# Patient Record
Sex: MALE | Race: WHITE | NOT HISPANIC OR LATINO | Employment: FULL TIME | URBAN - METROPOLITAN AREA
[De-identification: names, ages, dates, MRNs, and addresses within clinical notes are randomized per-mention and may not be internally consistent; named-entity substitution may affect disease eponyms.]

---

## 2019-08-05 ENCOUNTER — OFFICE VISIT (OUTPATIENT)
Dept: FAMILY MEDICINE | Facility: CLINIC | Age: 64
End: 2019-08-05
Payer: MEDICARE

## 2019-08-05 VITALS
TEMPERATURE: 98.4 F | HEART RATE: 77 BPM | HEIGHT: 72 IN | OXYGEN SATURATION: 98 % | BODY MASS INDEX: 26.82 KG/M2 | SYSTOLIC BLOOD PRESSURE: 144 MMHG | WEIGHT: 198 LBS | DIASTOLIC BLOOD PRESSURE: 86 MMHG

## 2019-08-05 DIAGNOSIS — Z12.5 SCREENING FOR PROSTATE CANCER: ICD-10-CM

## 2019-08-05 DIAGNOSIS — Z00.00 ENCOUNTER FOR MEDICARE ANNUAL WELLNESS EXAM: Primary | ICD-10-CM

## 2019-08-05 DIAGNOSIS — Z23 NEED FOR TDAP VACCINATION: ICD-10-CM

## 2019-08-05 DIAGNOSIS — L72.3 SEBACEOUS CYST: ICD-10-CM

## 2019-08-05 DIAGNOSIS — Z11.59 NEED FOR HEPATITIS C SCREENING TEST: ICD-10-CM

## 2019-08-05 DIAGNOSIS — E78.5 HYPERLIPIDEMIA, UNSPECIFIED HYPERLIPIDEMIA TYPE: ICD-10-CM

## 2019-08-05 DIAGNOSIS — Z23 NEED FOR PNEUMOCOCCAL VACCINE: ICD-10-CM

## 2019-08-05 DIAGNOSIS — Z13.1 SCREENING FOR DIABETES MELLITUS: ICD-10-CM

## 2019-08-05 PROCEDURE — 90715 TDAP VACCINE 7 YRS/> IM: CPT | Performed by: FAMILY MEDICINE

## 2019-08-05 PROCEDURE — G0009 ADMIN PNEUMOCOCCAL VACCINE: HCPCS | Mod: 59 | Performed by: FAMILY MEDICINE

## 2019-08-05 PROCEDURE — 90471 IMMUNIZATION ADMIN: CPT | Performed by: FAMILY MEDICINE

## 2019-08-05 PROCEDURE — 90670 PCV13 VACCINE IM: CPT | Performed by: FAMILY MEDICINE

## 2019-08-05 PROCEDURE — G0438 PPPS, INITIAL VISIT: HCPCS | Performed by: FAMILY MEDICINE

## 2019-08-05 PROCEDURE — 99213 OFFICE O/P EST LOW 20 MIN: CPT | Mod: 25 | Performed by: FAMILY MEDICINE

## 2019-08-05 RX ORDER — SIMVASTATIN 40 MG
TABLET ORAL
COMMUNITY
Start: 2019-06-03 | End: 2019-11-20

## 2019-08-05 SDOH — HEALTH STABILITY: MENTAL HEALTH: HOW OFTEN DO YOU HAVE A DRINK CONTAINING ALCOHOL?: 2-3 TIMES A WEEK

## 2019-08-05 SDOH — HEALTH STABILITY: MENTAL HEALTH: HOW MANY STANDARD DRINKS CONTAINING ALCOHOL DO YOU HAVE ON A TYPICAL DAY?: 1 OR 2

## 2019-08-05 ASSESSMENT — MIFFLIN-ST. JEOR: SCORE: 1713.18

## 2019-08-05 ASSESSMENT — ACTIVITIES OF DAILY LIVING (ADL): CURRENT_FUNCTION: NO ASSISTANCE NEEDED

## 2019-08-05 NOTE — PROGRESS NOTES
"SUBJECTIVE:   Joseph Grissom is a 63 year old male who presents for Preventive Visit.    Are you in the first 12 months of your Medicare coverage?  No      Healthy Habits:    In general, how would you rate your overall health?  Excellent    Frequency of exercise:  2-3 days/week    Duration of exercise:  45-60 minutes    Do you usually eat at least 4 servings of fruit and vegetables a day, include whole grains    & fiber and avoid regularly eating high fat or \"junk\" foods?  Yes    Taking medications regularly:  Yes    Barriers to taking medications:  None    Medication side effects:  None    Ability to successfully perform activities of daily living:  No assistance needed    Home Safety:  No safety concerns identified    Hearing Impairment:  Find that men's voices are easier to understand than woman's    In the past 6 months, have you been bothered by leaking of urine? Yes    In general, how would you rate your overall mental or emotional health?  Very good      PHQ-2 Total Score:    Additional concerns today:  Yes      Cyst  bump on head that sometimes hurts x 3 years. Maybe has gotten a little bigger.    Hyperlipidemia: has been taking simvastatin. Interested in stopping medication. Feels like his diet was pretty bad in the past. Has been taking medication regularly and cut back on fatty foods.     Do you feel safe in your environment? Yes    Do you have a Health Care Directive? No: Advance care planning reviewed with patient; information given to patient to review.      Fall risk  Fallen 2 or more times in the past year?: No  Any fall with injury in the past year?: No    Cognitive Screening   1) Repeat 3 items (Leader, Season, Table)    2) Clock draw: NORMAL  3) 3 item recall: Recalls 3 objects  Results: 3 items recalled: COGNITIVE IMPAIRMENT LESS LIKELY    Mini-CogTM Copyright NICOLE Dacosta. Licensed by the author for use in [facility]; reprinted with permission (elza@.[email or web address]). All rights reserved.  "     Do you have sleep apnea, excessive snoring or daytime drowsiness?: no    Reviewed and updated as needed this visit by clinical staff  Tobacco  Allergies  Meds  Problems  Med Hx  Surg Hx  Soc Hx        Reviewed and updated as needed this visit by Provider  Allergies  Meds  Problems  Med Hx  Surg Hx        Social History     Tobacco Use     Smoking status: Never Smoker     Smokeless tobacco: Never Used   Substance Use Topics     Alcohol use: Yes     Frequency: 2-3 times a week     Drinks per session: 1 or 2     If you drink alcohol do you typically have >3 drinks per day or >7 drinks per week? No    Alcohol Use 8/5/2019   Prescreen: >3 drinks/day or >7 drinks/week? No       Current providers sharing in care for this patient include:   Patient Care Team:  No Ref-Primary, Physician as PCP - General    The following health maintenance items are reviewed in Epic and correct as of today:  Health Maintenance   Topic Date Due     HEPATITIS C SCREENING  1955     ADVANCE CARE PLANNING  1955     COLONOSCOPY  08/25/1963     HIV SCREENING  08/25/1968     ZOSTER IMMUNIZATION (1 of 2) 08/25/2003     FALL RISK ASSESSMENT  08/25/2018     AORTIC ANEURYSM SCREENING (SYSTEM ASSIGNED)  08/25/2018     INFLUENZA VACCINE (1) 09/01/2019     MEDICARE ANNUAL WELLNESS VISIT  08/05/2020     PNEUMOCOCCAL IMMUNIZATION 65+ LOW/MEDIUM RISK (2 of 2 - PPSV23) 08/05/2020     DTAP/TDAP/TD IMMUNIZATION (2 - Td) 08/05/2029     PHQ-2  Completed     IPV IMMUNIZATION  Aged Out     MENINGITIS IMMUNIZATION  Aged Out     Lab work is in process  Pneumonia Vaccine:Adults age 65+ who have not received previous Pneumovax (PPSV23) or PCV13 as an adult: Should first be given PCV13 AND then should be given PPSV23 6-12 months after PCV13    Review of Systems  Constitutional, HEENT, cardiovascular, pulmonary, gi and gu systems are negative, except as otherwise noted.    OBJECTIVE:   BP (!) 144/86   Pulse 77   Temp 98.4  F (36.9  C) (Oral)    "Ht 1.816 m (5' 11.5\")   Wt 89.8 kg (198 lb)   SpO2 98%   BMI 27.23 kg/m   Estimated body mass index is 27.23 kg/m  as calculated from the following:    Height as of this encounter: 1.816 m (5' 11.5\").    Weight as of this encounter: 89.8 kg (198 lb).  Physical Exam  GENERAL: healthy, alert and no distress  EYES: Eyes grossly normal to inspection, PERRL and conjunctivae and sclerae normal  HENT: ear canals and TM's normal, nose and mouth without ulcers or lesions  NECK: no adenopathy and no asymmetry, masses, or scars  RESP: lungs clear to auscultation - no rales, rhonchi or wheezes  CV: regular rate and rhythm, normal S1 S2, no S3 or S4, no murmur, click or rub, no peripheral edema and peripheral pulses strong  ABDOMEN: soft, nontender, no hepatosplenomegaly, no masses and bowel sounds normal  MS: no gross musculoskeletal defects noted, no edema  SKIN: no suspicious lesions or rashes; small firm round subcutaneous mass on left frontal scalp consistent with sebaceous type cyst  NEURO: Normal strength and tone, mentation intact and speech normal  PSYCH: mentation appears normal, affect normal/bright    Diagnostic Test Results: in process    ASSESSMENT / PLAN:   1. Encounter for Medicare annual wellness exam: health maintenance reviewed and updated    2. Need for Tdap vaccination  - TDAP VACCINE (ADACEL)  - VACCINE ADMINISTRATION, EACH ADDITIONAL    3. Need for pneumococcal vaccine  - Pneumococcal vaccine 13 valent PCV13 IM (Prevnar) [41781]    4. Screening for diabetes mellitus  - **Basic metabolic panel FUTURE anytime; Future    5. Need for hepatitis C screening test: Discussed CDC recommendation for one time hepatitis C screening for people born between 5050-6569.  Discussed risk factors for hepatitis C including blood transfusion and IV drug use.    - **Hepatitis C Screen Reflex to RNA FUTURE anytime; Future    6. Screening for prostate cancer: Discussed controversies surrounding PSA. Specifically reviewed 2017 " "USPSTF findings recommending discussion of PSA testing for men ages 55-69.  Reviewed findings of the  Randomized Study of Screening for Prostate Cancer which showed a 30% reduction in advanced stage prostate cancer and a 20% reduction in death rate from prostate cancer in this age group. PSA-based screening may prevent up to 2 deaths and up to 3 cases of metastatic disease per 1,000 men screened over 13 years.  We've elected to do PSA this year after discussing these controversies.  - PSA, screen; Future    7. Hyperlipidemia, unspecified hyperlipidemia type  - Lipid panel reflex to direct LDL Fasting; Future    8. Sebaceous cyst: discussed benign nature of lump - options to monitor or refer for surgical excision. He will keep an eye on it.      End of Life Planning:  Patient currently has an advanced directive: No.  I have verified the patient's ablity to prepare an advanced directive/make health care decisions.  Literature was provided to assist patient in preparing an advanced directive.    COUNSELING:  Reviewed preventive health counseling, as reflected in patient instructions       Regular exercise       Healthy diet/nutrition    Estimated body mass index is 27.23 kg/m  as calculated from the following:    Height as of this encounter: 1.816 m (5' 11.5\").    Weight as of this encounter: 89.8 kg (198 lb).         reports that he has never smoked. He has never used smokeless tobacco.      Appropriate preventive services were discussed with this patient, including applicable screening as appropriate for cardiovascular disease, diabetes, osteopenia/osteoporosis, and glaucoma.  As appropriate for age/gender, discussed screening for colorectal cancer, prostate cancer, breast cancer, and cervical cancer. Checklist reviewing preventive services available has been given to the patient.    Reviewed patients plan of care and provided an AVS. The Basic Care Plan (routine screening as documented in Health Maintenance) " for Joseph meets the Care Plan requirement. This Care Plan has been established and reviewed with the Patient.    Counseling Resources:  ATP IV Guidelines  Pooled Cohorts Equation Calculator  Breast Cancer Risk Calculator  FRAX Risk Assessment  ICSI Preventive Guidelines  Dietary Guidelines for Americans, 2010  USDA's MyPlate  ASA Prophylaxis  Lung CA Screening    Twin Holcomb DO  [facility]    Identified Health Risks:

## 2019-08-13 DIAGNOSIS — E78.5 HYPERLIPIDEMIA, UNSPECIFIED HYPERLIPIDEMIA TYPE: ICD-10-CM

## 2019-08-13 DIAGNOSIS — Z13.1 SCREENING FOR DIABETES MELLITUS: ICD-10-CM

## 2019-08-13 DIAGNOSIS — Z11.59 NEED FOR HEPATITIS C SCREENING TEST: ICD-10-CM

## 2019-08-13 DIAGNOSIS — Z12.5 SCREENING FOR PROSTATE CANCER: ICD-10-CM

## 2019-08-13 LAB
ANION GAP SERPL CALCULATED.3IONS-SCNC: 6 MMOL/L (ref 3–14)
BUN SERPL-MCNC: 15 MG/DL (ref 7–30)
CALCIUM SERPL-MCNC: 8.5 MG/DL (ref 8.5–10.1)
CHLORIDE SERPL-SCNC: 109 MMOL/L (ref 94–109)
CHOLEST SERPL-MCNC: 100 MG/DL
CO2 SERPL-SCNC: 25 MMOL/L (ref 20–32)
CREAT SERPL-MCNC: 0.93 MG/DL (ref 0.66–1.25)
GFR SERPL CREATININE-BSD FRML MDRD: 85 ML/MIN/{1.73_M2}
GLUCOSE SERPL-MCNC: 101 MG/DL (ref 70–99)
HCV AB SERPL QL IA: NONREACTIVE
HDLC SERPL-MCNC: 31 MG/DL
LDLC SERPL CALC-MCNC: 46 MG/DL
NONHDLC SERPL-MCNC: 69 MG/DL
POTASSIUM SERPL-SCNC: 4.4 MMOL/L (ref 3.4–5.3)
PSA SERPL-ACNC: 6.77 UG/L (ref 0–4)
SODIUM SERPL-SCNC: 143 MMOL/L (ref 133–144)
TRIGL SERPL-MCNC: 115 MG/DL

## 2019-08-13 PROCEDURE — 80048 BASIC METABOLIC PNL TOTAL CA: CPT | Performed by: FAMILY MEDICINE

## 2019-08-13 PROCEDURE — 80061 LIPID PANEL: CPT | Performed by: FAMILY MEDICINE

## 2019-08-13 PROCEDURE — G0103 PSA SCREENING: HCPCS | Performed by: FAMILY MEDICINE

## 2019-08-13 PROCEDURE — 36415 COLL VENOUS BLD VENIPUNCTURE: CPT | Performed by: FAMILY MEDICINE

## 2019-08-13 PROCEDURE — G0472 HEP C SCREEN HIGH RISK/OTHER: HCPCS | Performed by: FAMILY MEDICINE

## 2019-08-14 ENCOUNTER — TELEPHONE (OUTPATIENT)
Dept: FAMILY MEDICINE | Facility: CLINIC | Age: 64
End: 2019-08-14

## 2019-08-14 DIAGNOSIS — E78.5 HYPERLIPIDEMIA, UNSPECIFIED HYPERLIPIDEMIA TYPE: ICD-10-CM

## 2019-08-14 DIAGNOSIS — Z12.5 SCREENING FOR PROSTATE CANCER: ICD-10-CM

## 2019-08-14 DIAGNOSIS — R97.20 ELEVATED PROSTATE SPECIFIC ANTIGEN (PSA): Primary | ICD-10-CM

## 2019-08-14 NOTE — TELEPHONE ENCOUNTER
I called Gisselle back, we are also having Dr. Holcomb review other information. So, I advised him will also have him weigh in on this and will call him back next week with all recommendations. Dr. Holcomb please advise. Thank you, Carmen Rhoades R.N.

## 2019-08-14 NOTE — TELEPHONE ENCOUNTER
Reason for Call:  Other appointment    Detailed comments: Pt called this morning and would like to speak with a nurse in regards to wether or not his prostate check appt that is scheduled for October 14th is an appropriate amount of time to wait. Please give pt a call if this is okay. Pt would like to know either way. Thank you.    Phone Number Patient can be reached at: Home number on file [phone] (home)    Best Time:     Can we leave a detailed message on this number? YES    Call taken on 8/14/2019 at 10:34 AM by Rosemary Young

## 2019-08-14 NOTE — RESULT ENCOUNTER NOTE
Please send the following letter:  Please call [place] with the following:    Mr. Grissom,    -PSA is elevated and I would like you to see a urologist about this. I have referred you to:  [facility] [phone]   [email or web address]  You can call them to set up a consultation.  I like Dr. Romeo Cano a lot, though he offices mainly out of their [place] office.  -HDL(good) cholesterol level is low which can increase your heart disease risk.  A diet high in fat and simple carbohydrates, genetics and being overweight can contribute to this. Your LDL(bad) cholesterol and trigylceride levels are normal.  ADVISE: exercising 150 minutes of aerobic exercise per week (30 minutes 5 days per week or 50 minutes 3 days per week are options), and omega-3 fatty acids (fish oil) 5104-3322 mg daily are helpful to improve this.  In 12 months, you should recheck your fasting cholesterol panel by scheduling a lab-only appointment.  -Kidney function is normal (Cr, GFR), Sodium is normal, Potassium is normal, Calcium is normal, Glucose is normal.   -Hepatitis C antibody screen test shows no signs of a previous hepatitis C infection.    If you have further questions about the interpretation of your labs, [email or web address] is a good website to check out for further information.      Please contact the clinic if you have additional questions.  Thank you.    Sincerely,    Eamon Boucher MD

## 2019-08-21 NOTE — TELEPHONE ENCOUNTER
Patient was notified with information noted by provider and agreed with plan.  MIKAYLA GoldsteinN, RN  [employer] [facility]

## 2019-08-21 NOTE — TELEPHONE ENCOUNTER
I think it is okay to continue with the plan of stopping the simvastatin and rechecking a fasting lipid panel in 6 months.    Seeing Dr. Cano on October 14 will be okay. Given that his PSA was elevated but under 7, I think it would also be reasonable to recheck this level the first or second week in October before the appointment to see if it remains elevated, increases further, or even normalizes. I will place a future order and this can be rechecked at that time.    Twin Holcomb,   8/20/2019 10:56 PM

## 2019-10-11 DIAGNOSIS — R97.20 ELEVATED PROSTATE SPECIFIC ANTIGEN (PSA): Primary | ICD-10-CM

## 2019-10-14 ENCOUNTER — OFFICE VISIT (OUTPATIENT)
Dept: UROLOGY | Facility: CLINIC | Age: 64
End: 2019-10-14
Payer: MEDICARE

## 2019-10-14 VITALS
HEIGHT: 72 IN | HEART RATE: 80 BPM | BODY MASS INDEX: 25.73 KG/M2 | DIASTOLIC BLOOD PRESSURE: 62 MMHG | SYSTOLIC BLOOD PRESSURE: 120 MMHG | WEIGHT: 190 LBS

## 2019-10-14 DIAGNOSIS — E78.5 HYPERLIPIDEMIA, UNSPECIFIED HYPERLIPIDEMIA TYPE: ICD-10-CM

## 2019-10-14 DIAGNOSIS — R97.20 ELEVATED PROSTATE SPECIFIC ANTIGEN (PSA): ICD-10-CM

## 2019-10-14 DIAGNOSIS — Z12.5 SCREENING FOR PROSTATE CANCER: ICD-10-CM

## 2019-10-14 LAB
ALBUMIN UR-MCNC: NEGATIVE MG/DL
APPEARANCE UR: CLEAR
BILIRUB UR QL STRIP: NEGATIVE
CHOLEST SERPL-MCNC: 144 MG/DL
COLOR UR AUTO: YELLOW
GLUCOSE UR STRIP-MCNC: NEGATIVE MG/DL
HDLC SERPL-MCNC: 32 MG/DL
HGB UR QL STRIP: NEGATIVE
KETONES UR STRIP-MCNC: NEGATIVE MG/DL
LDLC SERPL CALC-MCNC: 86 MG/DL
LEUKOCYTE ESTERASE UR QL STRIP: NEGATIVE
NITRATE UR QL: NEGATIVE
NONHDLC SERPL-MCNC: 112 MG/DL
PH UR STRIP: 8.5 PH (ref 5–7)
PSA SERPL-ACNC: 9.02 UG/L (ref 0–4)
RESIDUAL VOLUME (RV) (EXTERNAL): 139
SOURCE: ABNORMAL
SP GR UR STRIP: 1.02 (ref 1–1.03)
TRIGL SERPL-MCNC: 131 MG/DL
UROBILINOGEN UR STRIP-ACNC: 0.2 EU/DL (ref 0.2–1)

## 2019-10-14 PROCEDURE — G0103 PSA SCREENING: HCPCS | Performed by: FAMILY MEDICINE

## 2019-10-14 PROCEDURE — 99204 OFFICE O/P NEW MOD 45 MIN: CPT | Mod: 25 | Performed by: UROLOGY

## 2019-10-14 PROCEDURE — 51798 US URINE CAPACITY MEASURE: CPT | Performed by: UROLOGY

## 2019-10-14 PROCEDURE — 80061 LIPID PANEL: CPT | Performed by: FAMILY MEDICINE

## 2019-10-14 PROCEDURE — 36415 COLL VENOUS BLD VENIPUNCTURE: CPT | Performed by: FAMILY MEDICINE

## 2019-10-14 PROCEDURE — 81003 URINALYSIS AUTO W/O SCOPE: CPT | Performed by: UROLOGY

## 2019-10-14 ASSESSMENT — MIFFLIN-ST. JEOR: SCORE: 1679.83

## 2019-10-14 ASSESSMENT — PAIN SCALES - GENERAL: PAINLEVEL: NO PAIN (0)

## 2019-10-14 NOTE — PROGRESS NOTES
[facility]  Main Office: [address]  [address]  [place] [address]       CHIEF COMPLAINT:  Elevated PSA    HISTORY:   I was asked by Dr. Twin Holcomb to see this 64-year-old gentleman who presents with an elevated PSA.  His PSA was 6.77 in August.  He just had his PSA rechecked today and it is now 9.02.  He knows of no prior PSA history.  He thinks he may have had a checked in [place] next year but he is not certain as to the value.  Prior to this he had lived in [place] for [place] years and he says he had his prostate checked manually at that time but does not know if he had his PSA checked.  He has no prior urologic history.  He has no family history of prostate cancer.  He has no urinary symptoms or complaints.  He gets up once a night to urinate.  No history of gross hematuria or infections.      PAST MEDICAL HISTORY:   Past Medical History:   Diagnosis Date     Hyperlipidemia        PAST SURGICAL HISTORY:   Past Surgical History:   Procedure Laterality Date     APPENDECTOMY       FACIAL RECONSTRUCTION SURGERY      had cheek bone fixed after trauma     HERNIA REPAIR       VASECTOMY       vasectomy reversal         FAMILY HISTORY:   Family History   Problem Relation Age of Onset     Cancer Brother      Hypertension Mother      Hypertension Father      Diabetes Father      Skin Cancer Father      Alzheimer Disease Father      Diabetes Brother        SOCIAL HISTORY:   Social History     Tobacco Use     Smoking status: Never Smoker     Smokeless tobacco: Never Used   Substance Use Topics     Alcohol use: Yes     Frequency: 2-3 times a week     Drinks per session: 1 or 2          Allergies   Allergen Reactions     Eggs [Chicken-Derived Products (Egg)]      Penicillins          Current Outpatient Medications:      simvastatin (ZOCOR) 40 MG tablet, , Disp: , Rfl:     Review Of Systems:  Skin: No rash, pruritis, or skin pigmentation  Eyes: No changes in vision  Ears/Nose/Throat: No changes in hearing,  no nosebleeds  Respiratory: No shortness of breath, dyspnea on exertion, cough, or hemoptysis  Cardiovascular: No chest pain or palpitations  Gastrointestinal: No diarrhea or constipation. No abdominal pain. No hematochezia  Genitourinary: see HPI  Musculoskeletal: No pain or swelling of joints, normal range of motion  Neurologic: No weakness or tremors  Psychiatric: No recent changes in memory or mood  Hematologic/Lymphatic/Immunologic: No easy bruising or enlarged lymph nodes  Endocrine: No weight gain or loss      PHYSICAL EXAM:    There were no vitals taken for this visit.  General appearance: In NAD, conversant  HEENT: Normocephalic and atraumatic, anicteric sclera  Cardiovascular: Not examined  Respiratory: normal, non-labored breathing  Gastrointestinal: negative, Abdomen soft, non-tender, and non-distended.   Musculoskeletal: Normal musculature and movements  Peripheral Vascular/extremity: No peripheral edema  Skin: Normal temperature, turgor, and texture. No rash  Psychiatric: Appropriate affect, alert and oriented to person, place, and time    Penis: Normal  Scrotal skin: Normal, no lesions  Testicles: Normal to palpation bilaterally  Epididymis: Normal to palpation bilaterally  Lymphatic: Normal inguinal lymph nodes  Digital Rectal Exam: The prostate is somewhat enlarged, benign and symmetric to palpation    Cystoscopy: Not done      PSA: 9.02    UA RESULTS:  No results for input(s): COLOR, APPEARANCE, URINEGLC, URINEBILI, URINEKETONE, SG, UBLD, URINEPH, PROTEIN, UROBILINOGEN, NITRITE, LEUKEST, RBCU, WBCU in the last 26443 hours.    Bladder Scan: 139mL    Other Labs:      Imaging Studies: None      CLINICAL IMPRESSION:   Elevated PSA    PLAN:   He has a persistently elevated PSA that continues to rise.  In light of this I recommended further evaluation for a potential prostate cancer.  I recommended an MRI of the prostate and we discussed this test.  I will contact him with the MRI results are available.   He was advised that it is likely he will need to undergo a prostate biopsy in the future and we did discuss this test as well along with its risks, including bleeding and infection.      Eamon Cano MD

## 2019-10-14 NOTE — LETTER
10/14/2019       RE: Joseph Grissom  [address] [place] [address]     Dear Colleague,  Thank you for referring your patient, Joseph Grissom, to the [facility] [place] [facility]. Please see a copy of my visit note below.    [facility]  Main Office: [address]  [address]  [place] [address]       CHIEF COMPLAINT:  Elevated PSA    HISTORY:   I was asked by Dr. Twin Holcomb to see this 64-year-old gentleman who presents with an elevated PSA.  His PSA was 6.77 in August.  He just had his PSA rechecked today and it is now 9.02.  He knows of no prior PSA history.  He thinks he may have had a checked in [place] next year but he is not certain as to the value.  Prior to this he had lived in [place] for [place] years and he says he had his prostate checked manually at that time but does not know if he had his PSA checked.  He has no prior urologic history.  He has no family history of prostate cancer.  He has no urinary symptoms or complaints.  He gets up once a night to urinate.  No history of gross hematuria or infections.    PAST MEDICAL HISTORY:   Past Medical History:   Diagnosis Date     Hyperlipidemia      PAST SURGICAL HISTORY:   Past Surgical History:   Procedure Laterality Date     APPENDECTOMY       FACIAL RECONSTRUCTION SURGERY      had cheek bone fixed after trauma     HERNIA REPAIR       VASECTOMY       vasectomy reversal       FAMILY HISTORY:   Family History   Problem Relation Age of Onset     Cancer Brother      Hypertension Mother      Hypertension Father      Diabetes Father      Skin Cancer Father      Alzheimer Disease Father      Diabetes Brother      SOCIAL HISTORY:   Social History     Tobacco Use     Smoking status: Never Smoker     Smokeless tobacco: Never Used   Substance Use Topics     Alcohol use: Yes     Frequency: 2-3 times a week     Drinks per session: 1 or 2     Allergies    Allergen Reactions     Eggs [Chicken-Derived Products (Egg)]      Penicillins      Current Outpatient Medications:      simvastatin (ZOCOR) 40 MG tablet, , Disp: , Rfl:     Review Of Systems:  Skin: No rash, pruritis, or skin pigmentation  Eyes: No changes in vision  Ears/Nose/Throat: No changes in hearing, no nosebleeds  Respiratory: No shortness of breath, dyspnea on exertion, cough, or hemoptysis  Cardiovascular: No chest pain or palpitations  Gastrointestinal: No diarrhea or constipation. No abdominal pain. No hematochezia  Genitourinary: see HPI  Musculoskeletal: No pain or swelling of joints, normal range of motion  Neurologic: No weakness or tremors  Psychiatric: No recent changes in memory or mood  Hematologic/Lymphatic/Immunologic: No easy bruising or enlarged lymph nodes  Endocrine: No weight gain or loss      PHYSICAL EXAM:  There were no vitals taken for this visit.  General appearance: In NAD, conversant  HEENT: Normocephalic and atraumatic, anicteric sclera  Cardiovascular: Not examined  Respiratory: normal, non-labored breathing  Gastrointestinal: negative, Abdomen soft, non-tender, and non-distended.   Musculoskeletal: Normal musculature and movements  Peripheral Vascular/extremity: No peripheral edema  Skin: Normal temperature, turgor, and texture. No rash  Psychiatric: Appropriate affect, alert and oriented to person, place, and time    Penis: Normal  Scrotal skin: Normal, no lesions  Testicles: Normal to palpation bilaterally  Epididymis: Normal to palpation bilaterally  Lymphatic: Normal inguinal lymph nodes  Digital Rectal Exam: The prostate is somewhat enlarged, benign and symmetric to palpation    Cystoscopy: Not done    PSA: 9.02    UA RESULTS:  No results for input(s): COLOR, APPEARANCE, URINEGLC, URINEBILI, URINEKETONE, SG, UBLD, URINEPH, PROTEIN, UROBILINOGEN, NITRITE, LEUKEST, RBCU, WBCU in the last 66113 hours.    Bladder Scan: 139mL    Other Labs:      Imaging Studies:  None    CLINICAL IMPRESSION:   Elevated PSA    PLAN:   He has a persistently elevated PSA that continues to rise.  In light of this I recommended further evaluation for a potential prostate cancer.  I recommended an MRI of the prostate and we discussed this test.  I will contact him with the MRI results are available.  He was advised that it is likely he will need to undergo a prostate biopsy in the future and we did discuss this test as well along with its risks, including bleeding and infection.        Eamon Cano MD

## 2019-10-20 ENCOUNTER — ANCILLARY PROCEDURE (OUTPATIENT)
Dept: MRI IMAGING | Facility: CLINIC | Age: 64
End: 2019-10-20
Attending: UROLOGY
Payer: MEDICARE

## 2019-10-20 DIAGNOSIS — R97.20 ELEVATED PROSTATE SPECIFIC ANTIGEN (PSA): ICD-10-CM

## 2019-10-20 RX ORDER — GADOBUTROL 604.72 MG/ML
10 INJECTION INTRAVENOUS ONCE
Status: COMPLETED | OUTPATIENT
Start: 2019-10-20 | End: 2019-10-20

## 2019-10-20 RX ADMIN — GADOBUTROL 10 ML: 604.72 INJECTION INTRAVENOUS at 17:23

## 2019-10-20 NOTE — DISCHARGE INSTRUCTIONS
MRI Contrast Discharge Instructions    The IV contrast you received today will pass out of your body in your  urine. This will happen in the next 24 hours. You will not feel this process.  Your urine will not change color.    Drink at least 4 extra glasses of water or juice today (unless your doctor  has restricted your fluids). This reduces the stress on your kidneys.  You may take your regular medicines.    If you are on dialysis: It is best to have dialysis today.    If you have a reaction: Most reactions happen right away. If you have  any new symptoms after leaving the hospital (such as hives or swelling),  call your hospital at the correct number below. Or call your family doctor.  If you have breathing distress or wheezing, call 911.    Special instructions: ***    I have read and understand the above information.    Signature:______________________________________ Date:___________    Staff:__________________________________________ Date:___________     Time:__________    [place] Rad[facility] Departments:    ___[place]: [phone]  ___[facility]: [phone]  ___[place]: [phone] ___[facility]: [phone]  ___[facility]: [phone]  ___[facility]: [phone]  ___[phone]  ___[facility]: [phone]  ___[phone]

## 2019-10-21 ENCOUNTER — TELEPHONE (OUTPATIENT)
Dept: SURGERY | Facility: CLINIC | Age: 64
End: 2019-10-21

## 2019-10-21 DIAGNOSIS — R97.20 ELEVATED PSA: Primary | ICD-10-CM

## 2019-10-21 RX ORDER — CIPROFLOXACIN 500 MG/1
500 TABLET, FILM COATED ORAL 2 TIMES DAILY
Qty: 6 TABLET | Refills: 0 | Status: SHIPPED | OUTPATIENT
Start: 2019-10-21 | End: 2019-11-20

## 2019-10-21 NOTE — TELEPHONE ENCOUNTER
Spoke on phone re:MRI results  Recommended prostate biopsy  Discussed risks, including bleeding and infection  Cipro prescribed

## 2019-11-12 ENCOUNTER — OFFICE VISIT (OUTPATIENT)
Dept: UROLOGY | Facility: CLINIC | Age: 64
End: 2019-11-12
Payer: MEDICARE

## 2019-11-12 VITALS
BODY MASS INDEX: 25.73 KG/M2 | SYSTOLIC BLOOD PRESSURE: 162 MMHG | WEIGHT: 190 LBS | DIASTOLIC BLOOD PRESSURE: 70 MMHG | HEIGHT: 72 IN | HEART RATE: 64 BPM

## 2019-11-12 DIAGNOSIS — R97.20 ELEVATED PROSTATE SPECIFIC ANTIGEN (PSA): Primary | ICD-10-CM

## 2019-11-12 PROCEDURE — 88305 TISSUE EXAM BY PATHOLOGIST: CPT | Mod: 26 | Performed by: UROLOGY

## 2019-11-12 PROCEDURE — 88305 TISSUE EXAM BY PATHOLOGIST: CPT | Performed by: UROLOGY

## 2019-11-12 PROCEDURE — 76872 US TRANSRECTAL: CPT | Performed by: UROLOGY

## 2019-11-12 PROCEDURE — 55700 ZZHC BIOPSY PROSTATE NEEDLE/PUNCH: CPT | Performed by: UROLOGY

## 2019-11-12 ASSESSMENT — PAIN SCALES - GENERAL
PAINLEVEL: NO PAIN (0)
PAINLEVEL: NO PAIN (0)

## 2019-11-12 ASSESSMENT — MIFFLIN-ST. JEOR: SCORE: 1679.83

## 2019-11-12 NOTE — NURSING NOTE
Chief Complaint   Patient presents with     Prostate Biopsy     Elevated PSA     Prior to the start of the procedure and with procedural staff participation, I verbally confirmed the patient s identity using two indicators, relevant allergies, that the procedure was appropriate and matched the consent or emergent situation, and that the correct equipment/implants were available. Immediately prior to starting the procedure I conducted the Time Out with the procedural staff and re-confirmed the patient s name, procedure, and site/side. (The Joint Commission universal protocol was followed.)  Yes    Sedation (Moderate or Deep): None      Gayle Watson LPN

## 2019-11-12 NOTE — PATIENT INSTRUCTIONS
[facility]  Transrectal Ultrasound  Post Operative Information    The physician who performed your Transrectal Ultrasound is Dr. Cano (telephone number [phone]).  Please contact this doctor if you have any problems or questions.  If unable to reach your doctor, please return to the Emergency Department.      Take one antibiotic the evening of the procedure and then as directed on your prescription.    Drink at least 6-8 glasses of fluids for the first 48 hours.    Avoid heavy lifting and strenuous activity for 48 hours.    Avoid sexual intercourse for the first 24 hours.    No aspirin or ibuprofen products (Motrin, Advil, Nuprin, ect.) for one week.  You may take acetaminophen (Tylenol) for pain.    You may notice a small amount of blood on the tissue after a bowel movement.    You may pass blood with clots in your urine following the procedure.  The amount will decrease with time but may be visible for up to two weeks.     You make have blood in your semen for 4 weeks after the procedure.    You may experience mild perineal (groin area) discomfort after the procedure.    Please call you doctor if you have any of the follow symptoms:  Fever  Increase in the amount of blood passed  Severe discomfort or pain

## 2019-11-12 NOTE — PROGRESS NOTES
Joseph Grissom is here for a transrectal altrasound guided needle biopsy of the prostate for a significant risk of potentially lethal prostate cancer.    The risks, benefits, of the procudure were discussed.  All questions were answered.  A written informed consent was obtained.      PSA   Date Value Ref Range Status   10/14/2019 9.02 (H) 0 - 4 ug/L Final     Comment:     Assay Method:  Chemiluminescence using Siemens Vista analyzer   08/13/2019 6.77 (H) 0 - 4 ug/L Final     Comment:     Assay Method:  Chemiluminescence using Siemens Vista analyzer       An enema was completed and 500 mg of Cipro twice daily was started prior to the biopsy.  After obtaining informed consent patient was placed in lateral decubitus position.  The ultrasound probe was placed in the rectum.  The prostate was numbed using ultrasound guidance with 1% lidocaine 5 mls along each nerve bundle.      US images were used to guide the biopsies of the prostate.  12 cores were taken with 6 on each side, 2 at the base,  2 at the midgland and  2 at the apex.  The patient tolerated the procedure well.      We will follow up with the results in 7-10 days and contact patient with these results.

## 2019-11-14 ENCOUNTER — TELEPHONE (OUTPATIENT)
Dept: SURGERY | Facility: CLINIC | Age: 64
End: 2019-11-14

## 2019-11-14 DIAGNOSIS — C61 PROSTATE CANCER (H): Primary | ICD-10-CM

## 2019-11-14 LAB — COPATH REPORT: NORMAL

## 2019-11-19 ENCOUNTER — HOSPITAL ENCOUNTER (OUTPATIENT)
Dept: CT IMAGING | Facility: CLINIC | Age: 64
Discharge: HOME | End: 2019-11-19
Attending: UROLOGY | Admitting: UROLOGY
Payer: MEDICARE

## 2019-11-19 ENCOUNTER — HOSPITAL ENCOUNTER (OUTPATIENT)
Dept: NUCLEAR MEDICINE | Facility: CLINIC | Age: 64
Setting detail: NUCLEAR MEDICINE
End: 2019-11-19
Attending: UROLOGY
Payer: MEDICARE

## 2019-11-19 DIAGNOSIS — C61 PROSTATE CANCER (H): ICD-10-CM

## 2019-11-19 PROCEDURE — 34300033 ZZH RX 343: Performed by: UROLOGY

## 2019-11-19 PROCEDURE — 74177 CT ABD & PELVIS W/CONTRAST: CPT

## 2019-11-19 PROCEDURE — 25000125 ZZHC RX 250: Performed by: RADIOLOGY

## 2019-11-19 PROCEDURE — A9561 TC99M OXIDRONATE: HCPCS | Performed by: UROLOGY

## 2019-11-19 PROCEDURE — 78306 BONE IMAGING WHOLE BODY: CPT

## 2019-11-19 PROCEDURE — 25000128 H RX IP 250 OP 636: Performed by: RADIOLOGY

## 2019-11-19 RX ORDER — IOPAMIDOL 755 MG/ML
500 INJECTION, SOLUTION INTRAVASCULAR ONCE
Status: COMPLETED | OUTPATIENT
Start: 2019-11-19 | End: 2019-11-19

## 2019-11-19 RX ADMIN — SODIUM CHLORIDE 55 ML: 9 INJECTION, SOLUTION INTRAVENOUS at 13:41

## 2019-11-19 RX ADMIN — IOPAMIDOL 95 ML: 755 INJECTION, SOLUTION INTRAVENOUS at 13:41

## 2019-11-19 RX ADMIN — Medication 26.5 MILLICURIE: at 13:02

## 2019-11-20 ENCOUNTER — OFFICE VISIT (OUTPATIENT)
Dept: UROLOGY | Facility: CLINIC | Age: 64
End: 2019-11-20
Payer: MEDICARE

## 2019-11-20 VITALS
HEIGHT: 72 IN | BODY MASS INDEX: 25.73 KG/M2 | SYSTOLIC BLOOD PRESSURE: 140 MMHG | WEIGHT: 190 LBS | HEART RATE: 80 BPM | DIASTOLIC BLOOD PRESSURE: 70 MMHG

## 2019-11-20 DIAGNOSIS — C61 PROSTATE CANCER (H): Primary | ICD-10-CM

## 2019-11-20 PROCEDURE — 99215 OFFICE O/P EST HI 40 MIN: CPT | Performed by: UROLOGY

## 2019-11-20 RX ORDER — PANTOTHENIC ACID (VIT B5) 500 MG
500 TABLET ORAL
COMMUNITY

## 2019-11-20 RX ORDER — ASCORBIC ACID 500 MG
TABLET ORAL
COMMUNITY
End: 2022-04-01

## 2019-11-20 RX ORDER — FEXOFENADINE HCL 180 MG/1
180 TABLET ORAL DAILY
COMMUNITY

## 2019-11-20 RX ORDER — MULTIVITAMIN WITH IRON
1 TABLET ORAL DAILY
COMMUNITY
End: 2022-04-01

## 2019-11-20 ASSESSMENT — MIFFLIN-ST. JEOR: SCORE: 1679.83

## 2019-11-20 ASSESSMENT — PAIN SCALES - GENERAL: PAINLEVEL: NO PAIN (0)

## 2019-11-20 NOTE — LETTER
11/20/2019       RE: Joseph Grissom  [address]  [place] [address]     Dear Colleague,    Thank you for referring your patient, Joseph Grissom, to the [facility] [place] [facility]. Please see a copy of my visit note below.    Office Visit Note  [facility]  [phone]    UROLOGIC DIAGNOSES:   T1C [place] 4+4 = 8 prostate cancer    CURRENT INTERVENTIONS:       HISTORY:   Gisselle returns to clinic today for discussion of his recent prostate biopsy results.  He had an MRI of the prostate that showed a high likelihood of prostate cancer with potential seminal vesicle invasion.  The prostate measured 20 g.  He underwent standard template prostate biopsy and it revealed a Wei 4+4 equals a prostate cancer in 3 cores.  The other 9 cores showed a Wei grade 7 prostate cancer.  There were no negative cores.  In light of these results I recommended that he have a CT scan and bone scan.  Both the CT scan and bone scan were negative.  He has felt well since the biopsy.  He reports a normal urinary stream.  He does report occasional incontinence.  He says that when he has to go to the bathroom badly he will occasionally lose a small amount of urine.  He reports normal erectile function.  He has had a prior open appendectomy when he was 17 years old and a hernia repair but no other abdominal surgeries.      PAST MEDICAL HISTORY:   Past Medical History:   Diagnosis Date     Hyperlipidemia      Mumps        PAST SURGICAL HISTORY:   Past Surgical History:   Procedure Laterality Date     APPENDECTOMY       FACIAL RECONSTRUCTION SURGERY      had cheek bone fixed after trauma     HERNIA REPAIR       VASECTOMY       VASECTOMY       vasectomy reversal       VASOVASOSTOMY         FAMILY HISTORY:   Family History   Problem Relation Age of Onset     Cancer Brother      Hypertension Mother      Hypertension Father       Diabetes Father      Skin Cancer Father      Alzheimer Disease Father      Diabetes Brother        SOCIAL HISTORY:   Social History     Tobacco Use     Smoking status: Never Smoker     Smokeless tobacco: Never Used   Substance Use Topics     Alcohol use: Yes     Frequency: 2-3 times a week     Drinks per session: 1 or 2       Current Outpatient Medications   Medication     fexofenadine (ALLEGRA) 180 MG tablet     L-ARGININE PO     magnesium 250 MG tablet     Melatonin 300 MCG TABS     Multiple Vitamins-Minerals (MULTIVITAMIN ADULT PO)     naphazoline-pheniramine (OPCON-A/VISINE A/NAPHCON A) SOLN ophthalmic solution     pantothenic acid 500 MG TABS     Triamcinolone Acetonide (NASACORT ALLERGY 24HR NA)     No current facility-administered medications for this visit.          PHYSICAL EXAM:    BP (!) 140/70 (BP Location: Right arm)   Pulse 80   Ht 1.829 m (6')   Wt 86.2 kg (190 lb)   BMI 25.77 kg/m       Constitutional: Well developed. Conversant and in no acute distress  Eyes: Anicteric sclera, conjunctiva clear, normal extraocular movements  ENT: Normocephalic and atraumatic,   Skin: Warm and dry. No rashes or lesions  Cardiac: No peripheral edema  Back/Flank: Not done  CNS/PNS: Normal musculature and movements, moves all extremities normally  Respiratory: Normal non-labored breathing  Abdomen: Soft nontender and nondistended  Peripheral Vascular: No peripheral edema  Mental Status/Psych: Alert and Oriented x 3. Normal mood and affect    Penis: Not done  Scrotal Skin: Not done  Testicles: Not done  Epididymis: Not done  Digital Rectal Exam:     Cystoscopy: Not done    Imaging: None    Urinalysis: UA RESULTS:  Recent Labs   Lab Test 10/14/19  1312   COLOR Yellow   APPEARANCE Clear   URINEGLC Negative   URINEBILI Negative   URINEKETONE Negative   SG 1.020   UBLD Negative   URINEPH 8.5*   PROTEIN Negative   UROBILINOGEN 0.2   NITRITE Negative   LEUKEST Negative       PSA: 9.02    Post Void Residual:     Other labs:  None today      IMPRESSION:  High risk prostate cancer    PLAN:  He has been diagnosed with a high-grade and high risk prostate cancer.  There is concern for seminal vesicle invasion on his MRI as well.  His metastatic work-up was negative, but we discussed the real possibility that he may have microscopic metastatic disease.  We discussed treatment options.  I would not recommend active surveillance.  I recommended that he consider either proceeding with radical prostatectomy surgery or proceeding with radiotherapy combined with 3 years of hormonal therapy.  We discussed each treatment option in detail today.  We discussed the pros and cons of each option.  We went over his MRI results and the concern for neurovascular bundle invasion as well as seminal vesicle invasion.  He understands that with either treatment option he would likely lose his erections completely.  He would like to discuss things over with his wife a bit at home and also talked to a friend who was recently treated for prostate cancer.  I will see him back in 2 weeks to go over his treatment options in some more detail again and get any questions answered that he may have at that time.    Total Time: 45 min                                      Total in Consultation: 45 min      Eamon Cano M.D.

## 2019-11-20 NOTE — NURSING NOTE
Chief Complaint   Patient presents with     Clinic Care Coordination - Follow-up     Go over Biopsy results-Elevated PSA     Gayle Watson LPN

## 2019-11-20 NOTE — PROGRESS NOTES
Office Visit Note  [facility]  [phone]    UROLOGIC DIAGNOSES:   T1C [place] 4+4 = 8 prostate cancer    CURRENT INTERVENTIONS:       HISTORY:   Gisselle returns to clinic today for discussion of his recent prostate biopsy results.  He had an MRI of the prostate that showed a high likelihood of prostate cancer with potential seminal vesicle invasion.  The prostate measured 20 g.  He underwent standard template prostate biopsy and it revealed a [place] 4+4 equals a prostate cancer in 3 cores.  The other 9 cores showed a [place] grade 7 prostate cancer.  There were no negative cores.  In light of these results I recommended that he have a CT scan and bone scan.  Both the CT scan and bone scan were negative.  He has felt well since the biopsy.  He reports a normal urinary stream.  He does report occasional incontinence.  He says that when he has to go to the bathroom badly he will occasionally lose a small amount of urine.  He reports normal erectile function.  He has had a prior open appendectomy when he was 17 years old and a hernia repair but no other abdominal surgeries.      PAST MEDICAL HISTORY:   Past Medical History:   Diagnosis Date     Hyperlipidemia      Mumps        PAST SURGICAL HISTORY:   Past Surgical History:   Procedure Laterality Date     APPENDECTOMY       FACIAL RECONSTRUCTION SURGERY      had cheek bone fixed after trauma     HERNIA REPAIR       VASECTOMY       VASECTOMY       vasectomy reversal       VASOVASOSTOMY         FAMILY HISTORY:   Family History   Problem Relation Age of Onset     Cancer Brother      Hypertension Mother      Hypertension Father      Diabetes Father      Skin Cancer Father      Alzheimer Disease Father      Diabetes Brother        SOCIAL HISTORY:   Social History     Tobacco Use     Smoking status: Never Smoker     Smokeless tobacco: Never Used   Substance Use Topics     Alcohol use: Yes     Frequency: 2-3 times a week     Drinks per session: 1 or 2        Current Outpatient Medications   Medication     fexofenadine (ALLEGRA) 180 MG tablet     L-ARGININE PO     magnesium 250 MG tablet     Melatonin 300 MCG TABS     Multiple Vitamins-Minerals (MULTIVITAMIN ADULT PO)     naphazoline-pheniramine (OPCON-A/VISINE A/NAPHCON A) SOLN ophthalmic solution     pantothenic acid 500 MG TABS     Triamcinolone Acetonide (NASACORT ALLERGY 24HR NA)     No current facility-administered medications for this visit.          PHYSICAL EXAM:    BP (!) 140/70 (BP Location: Right arm)   Pulse 80   Ht 1.829 m (6')   Wt 86.2 kg (190 lb)   BMI 25.77 kg/m      Constitutional: Well developed. Conversant and in no acute distress  Eyes: Anicteric sclera, conjunctiva clear, normal extraocular movements  ENT: Normocephalic and atraumatic,   Skin: Warm and dry. No rashes or lesions  Cardiac: No peripheral edema  Back/Flank: Not done  CNS/PNS: Normal musculature and movements, moves all extremities normally  Respiratory: Normal non-labored breathing  Abdomen: Soft nontender and nondistended  Peripheral Vascular: No peripheral edema  Mental Status/Psych: Alert and Oriented x 3. Normal mood and affect    Penis: Not done  Scrotal Skin: Not done  Testicles: Not done  Epididymis: Not done  Digital Rectal Exam:     Cystoscopy: Not done    Imaging: None    Urinalysis: UA RESULTS:  Recent Labs   Lab Test 10/14/19  1312   COLOR Yellow   APPEARANCE Clear   URINEGLC Negative   URINEBILI Negative   URINEKETONE Negative   SG 1.020   UBLD Negative   URINEPH 8.5*   PROTEIN Negative   UROBILINOGEN 0.2   NITRITE Negative   LEUKEST Negative       PSA: 9.02    Post Void Residual:     Other labs: None today      IMPRESSION:  High risk prostate cancer    PLAN:  He has been diagnosed with a high-grade and high risk prostate cancer.  There is concern for seminal vesicle invasion on his MRI as well.  His metastatic work-up was negative, but we discussed the real possibility that he may have microscopic metastatic  disease.  We discussed treatment options.  I would not recommend active surveillance.  I recommended that he consider either proceeding with radical prostatectomy surgery or proceeding with radiotherapy combined with 3 years of hormonal therapy.  We discussed each treatment option in detail today.  We discussed the pros and cons of each option.  We went over his MRI results and the concern for neurovascular bundle invasion as well as seminal vesicle invasion.  He understands that with either treatment option he would likely lose his erections completely.  He would like to discuss things over with his wife a bit at home and also talked to a friend who was recently treated for prostate cancer.  I will see him back in 2 weeks to go over his treatment options in some more detail again and get any questions answered that he may have at that time.    Total Time: 45 min                                      Total in Consultation: 45 min      Eamon Cano M.D.

## 2019-12-19 ENCOUNTER — TRANSFERRED RECORDS (OUTPATIENT)
Dept: HEALTH INFORMATION MANAGEMENT | Facility: CLINIC | Age: 64
End: 2019-12-19

## 2019-12-20 ENCOUNTER — OFFICE VISIT (OUTPATIENT)
Dept: UROLOGY | Facility: CLINIC | Age: 64
End: 2019-12-20
Payer: MEDICARE

## 2019-12-20 ENCOUNTER — TELEPHONE (OUTPATIENT)
Dept: UROLOGY | Facility: CLINIC | Age: 64
End: 2019-12-20

## 2019-12-20 VITALS
BODY MASS INDEX: 25.73 KG/M2 | WEIGHT: 190 LBS | SYSTOLIC BLOOD PRESSURE: 138 MMHG | HEIGHT: 72 IN | DIASTOLIC BLOOD PRESSURE: 70 MMHG | HEART RATE: 80 BPM

## 2019-12-20 DIAGNOSIS — C61 PROSTATE CANCER (H): Primary | ICD-10-CM

## 2019-12-20 DIAGNOSIS — C61 PROSTATE CANCER (H): ICD-10-CM

## 2019-12-20 PROCEDURE — 96402 CHEMO HORMON ANTINEOPL SQ/IM: CPT | Performed by: UROLOGY

## 2019-12-20 PROCEDURE — 99215 OFFICE O/P EST HI 40 MIN: CPT | Mod: 25 | Performed by: UROLOGY

## 2019-12-20 RX ORDER — SILDENAFIL CITRATE 20 MG/1
20 TABLET ORAL DAILY PRN
Qty: 30 TABLET | Refills: 3 | Status: SHIPPED | OUTPATIENT
Start: 2019-12-20 | End: 2020-10-05

## 2019-12-20 ASSESSMENT — PAIN SCALES - GENERAL: PAINLEVEL: NO PAIN (0)

## 2019-12-20 ASSESSMENT — MIFFLIN-ST. JEOR: SCORE: 1679.83

## 2019-12-20 NOTE — PROGRESS NOTES
Office Visit Note  [facility]  [phone]    UROLOGIC DIAGNOSES:   High risk prostate cancer    CURRENT INTERVENTIONS:       HISTORY:   Gisselle returns to clinic today for prostate cancer follow-up.  He has discussed prostate cancer radiotherapy with Dr. Jaeger and he has a few additional questions for me today.  He tells me today that after his discussion yesterday he wants to proceed with 3 years of hormonal therapy plus radiotherapy.  However, he has a few more questions.  He mainly asked me about treatments for erectile dysfunction.  He also asked me about surgery being performed after radiotherapy if necessary.      PAST MEDICAL HISTORY:   Past Medical History:   Diagnosis Date     Hyperlipidemia      Mumps        PAST SURGICAL HISTORY:   Past Surgical History:   Procedure Laterality Date     APPENDECTOMY       FACIAL RECONSTRUCTION SURGERY      had cheek bone fixed after trauma     HERNIA REPAIR       VASECTOMY       VASECTOMY       vasectomy reversal       VASOVASOSTOMY         FAMILY HISTORY:   Family History   Problem Relation Age of Onset     Cancer Brother      Hypertension Mother      Hypertension Father      Diabetes Father      Skin Cancer Father      Alzheimer Disease Father      Diabetes Brother        SOCIAL HISTORY:   Social History     Tobacco Use     Smoking status: Never Smoker     Smokeless tobacco: Never Used   Substance Use Topics     Alcohol use: Yes     Frequency: 2-3 times a week     Drinks per session: 1 or 2       Current Outpatient Medications   Medication     fexofenadine (ALLEGRA) 180 MG tablet     magnesium 250 MG tablet     Melatonin 300 MCG TABS     Multiple Vitamins-Minerals (MULTIVITAMIN ADULT PO)     naphazoline-pheniramine (OPCON-A/VISINE A/NAPHCON A) SOLN ophthalmic solution     pantothenic acid 500 MG TABS     Triamcinolone Acetonide (NASACORT ALLERGY 24HR NA)     No current facility-administered medications for this visit.          PHYSICAL EXAM:    /70  (BP Location: Right arm)   Pulse 80   Ht 1.829 m (6')   Wt 86.2 kg (190 lb)   BMI 25.77 kg/m      Constitutional: Well developed. Conversant and in no acute distress  Eyes: Anicteric sclera, conjunctiva clear, normal extraocular movements  ENT: Normocephalic and atraumatic,   Skin: Warm and dry. No rashes or lesions  Cardiac: No peripheral edema  Back/Flank: Not done  CNS/PNS: Normal musculature and movements, moves all extremities normally  Respiratory: Normal non-labored breathing  Abdomen: Soft nontender and nondistended  Peripheral Vascular: No peripheral edema  Mental Status/Psych: Alert and Oriented x 3. Normal mood and affect    Penis: Not done  Scrotal Skin: Not done  Testicles: Not done  Epididymis: Not done  Digital Rectal Exam:     Cystoscopy: Not done    Imaging: None    Urinalysis: UA RESULTS:  Recent Labs   Lab Test 10/14/19  1312   COLOR Yellow   APPEARANCE Clear   URINEGLC Negative   URINEBILI Negative   URINEKETONE Negative   SG 1.020   UBLD Negative   URINEPH 8.5*   PROTEIN Negative   UROBILINOGEN 0.2   NITRITE Negative   LEUKEST Negative       PSA: 9.02    Post Void Residual:     Other labs: None today      IMPRESSION:  High risk prostate cancer    PLAN:  He had multiple questions today that were answered for him.  Initially in our discussion today he was under the impression that he would be able to have erections while on the hormonal therapy and while undergoing radiotherapy.  I advised him that this is remotely possible but is extraordinarily unlikely.  I advised him again that after either surgery or hormonal therapy plus radiotherapy it is extremely likely that erections would be gone completely.  At the end of our discussion today he voiced understanding of this.  He was also under the impression that if the radiotherapy did not cure his cancer he could undergo surgery in the future.  I advised him that this would be technically possible but would also be extraordinarily unlikely.  I  advised him that performing radical prostatectomy surgery after radiotherapy is an extremely high risk procedure and would very likely result in incontinence.  I would not recommend such a surgery.  He voices understanding of this after our discussion.  He also had questions about the hormonal therapy and its risks.  We went over an extensive list of risks of hormonal therapy.  In light of these risks he would like to proceed with hormonal therapy today.    He received a 6-month injection of Lupron today.  He will follow-up with Dr. Jaeger this spring to perform his radiotherapy.  I will plan on seeing him back in 6 months PSA and to resume hormonal therapy.    Total Time: 45 min                                      Total in Consultation: 45 min      Eamon Cano M.D.

## 2019-12-20 NOTE — TELEPHONE ENCOUNTER
----- Message from Suzy Hightower sent at 12/20/2019 10:12 AM CST -----  Can you please put in PSA order for his 6 month follow-up?

## 2019-12-20 NOTE — NURSING NOTE
The following medication was given:     MEDICATION:  Lupron 45mg  ROUTE: IM  SITE: (R) Gluteal   DOSE: 45mg  LOT #: [id number]  : [employer]   EXPIRATION DATE: 03/16/2022  NDC#: 3689-9908-55  Was there drug waste? no  Multi-dose vial: no    Gayle Watson LPN  December 20, 2019

## 2019-12-20 NOTE — NURSING NOTE
Chief Complaint   Patient presents with     Clinic Care Coordination - Follow-up     Prostate Cancer      Gayle Watson LPN

## 2019-12-20 NOTE — LETTER
12/20/2019       RE: Joseph Grissom  [address]  [place] [address]     Dear Colleague,    Thank you for referring your patient, Joseph Grissom, to the [facility] [place] at [facility]. Please see a copy of my visit note below.    Office Visit Note  [facility]  [phone]    UROLOGIC DIAGNOSES:   High risk prostate cancer    CURRENT INTERVENTIONS:       HISTORY:   Gisselle returns to clinic today for prostate cancer follow-up.  He has discussed prostate cancer radiotherapy with Dr. Jaeger and he has a few additional questions for me today.  He tells me today that after his discussion yesterday he wants to proceed with 3 years of hormonal therapy plus radiotherapy.  However, he has a few more questions.  He mainly asked me about treatments for erectile dysfunction.  He also asked me about surgery being performed after radiotherapy if necessary.      PAST MEDICAL HISTORY:   Past Medical History:   Diagnosis Date     Hyperlipidemia      Mumps        PAST SURGICAL HISTORY:   Past Surgical History:   Procedure Laterality Date     APPENDECTOMY       FACIAL RECONSTRUCTION SURGERY      had cheek bone fixed after trauma     HERNIA REPAIR       VASECTOMY       VASECTOMY       vasectomy reversal       VASOVASOSTOMY         FAMILY HISTORY:   Family History   Problem Relation Age of Onset     Cancer Brother      Hypertension Mother      Hypertension Father      Diabetes Father      Skin Cancer Father      Alzheimer Disease Father      Diabetes Brother        SOCIAL HISTORY:   Social History     Tobacco Use     Smoking status: Never Smoker     Smokeless tobacco: Never Used   Substance Use Topics     Alcohol use: Yes     Frequency: 2-3 times a week     Drinks per session: 1 or 2       Current Outpatient Medications   Medication     fexofenadine (ALLEGRA) 180 MG tablet     magnesium 250 MG tablet     Melatonin 300 MCG TABS      Multiple Vitamins-Minerals (MULTIVITAMIN ADULT PO)     naphazoline-pheniramine (OPCON-A/VISINE A/NAPHCON A) SOLN ophthalmic solution     pantothenic acid 500 MG TABS     Triamcinolone Acetonide (NASACORT ALLERGY 24HR NA)     No current facility-administered medications for this visit.          PHYSICAL EXAM:    /70 (BP Location: Right arm)   Pulse 80   Ht 1.829 m (6')   Wt 86.2 kg (190 lb)   BMI 25.77 kg/m       Constitutional: Well developed. Conversant and in no acute distress  Eyes: Anicteric sclera, conjunctiva clear, normal extraocular movements  ENT: Normocephalic and atraumatic,   Skin: Warm and dry. No rashes or lesions  Cardiac: No peripheral edema  Back/Flank: Not done  CNS/PNS: Normal musculature and movements, moves all extremities normally  Respiratory: Normal non-labored breathing  Abdomen: Soft nontender and nondistended  Peripheral Vascular: No peripheral edema  Mental Status/Psych: Alert and Oriented x 3. Normal mood and affect    Penis: Not done  Scrotal Skin: Not done  Testicles: Not done  Epididymis: Not done  Digital Rectal Exam:     Cystoscopy: Not done    Imaging: None    Urinalysis: UA RESULTS:  Recent Labs   Lab Test 10/14/19  1312   COLOR Yellow   APPEARANCE Clear   URINEGLC Negative   URINEBILI Negative   URINEKETONE Negative   SG 1.020   UBLD Negative   URINEPH 8.5*   PROTEIN Negative   UROBILINOGEN 0.2   NITRITE Negative   LEUKEST Negative       PSA: 9.02    Post Void Residual:     Other labs: None today      IMPRESSION:  High risk prostate cancer    PLAN:  He had multiple questions today that were answered for him.  Initially in our discussion today he was under the impression that he would be able to have erections while on the hormonal therapy and while undergoing radiotherapy.  I advised him that this is remotely possible but is extraordinarily unlikely.  I advised him again that after either surgery or hormonal therapy plus radiotherapy it is extremely likely that  erections would be gone completely.  At the end of our discussion today he voiced understanding of this.  He was also under the impression that if the radiotherapy did not cure his cancer he could undergo surgery in the future.  I advised him that this would be technically possible but would also be extraordinarily unlikely.  I advised him that performing radical prostatectomy surgery after radiotherapy is an extremely high risk procedure and would very likely result in incontinence.  I would not recommend such a surgery.  He voices understanding of this after our discussion.  He also had questions about the hormonal therapy and its risks.  We went over an extensive list of risks of hormonal therapy.  In light of these risks he would like to proceed with hormonal therapy today.    He received a 6-month injection of Lupron today.  He will follow-up with Dr. Jaeger this spring to perform his radiotherapy.  I will plan on seeing him back in 6 months PSA and to resume hormonal therapy.    Total Time: 45 min                                      Total in Consultation: 45 min      Eamon Cano M.D.

## 2019-12-31 ENCOUNTER — TELEPHONE (OUTPATIENT)
Dept: UROLOGY | Facility: CLINIC | Age: 64
End: 2019-12-31

## 2019-12-31 NOTE — TELEPHONE ENCOUNTER
Fax received from [facility] and forwarded to PA team.  GEORGE Sorto RN       Health Call Center    Phone Message    May a detailed message be left on voicemail: yes    Reason for Call: Medication Question or concern regarding medication   Prescription Clarification  Name of Medication: sildenafil (REVATIO) 20 MG tablet  Prescribing Provider: Dr Cano   Pharmacy: [facility]IV[facility] - [facility] [id number]   What on the order needs clarification? Gisselle calling to report that [facility] is requiring a PA for the medication.  He states that he spoke with them last night and they will be faxing over the request today.  Please call him with any questions or concerns          Action Taken: Message routed to:  Other: [facility]

## 2020-01-02 NOTE — TELEPHONE ENCOUNTER
Tried accessing the Key started in Cover My Meds, but received below message. Called insurance to verify if any further information is needed for review.  Did need to give all P/A info over the phone.     PA Initiation - Case# [id number]. Allow  24-72 hours.    Medication: sildenafil (REVATIO) 20 MG tablet  Insurance Company: [employer] - Phone [phone] Fax [phone]  Pharmacy Filling the Rx: [facility]IV[facility] - [place] [address]  Filling Pharmacy Phone: [phone]  Filling Pharmacy Fax: [phone]  Start Date: 1/2/2020

## 2020-01-03 ENCOUNTER — DOCUMENTATION ONLY (OUTPATIENT)
Dept: UROLOGY | Facility: CLINIC | Age: 65
End: 2020-01-03

## 2020-01-07 NOTE — TELEPHONE ENCOUNTER
Called insurance to check status of P/A request, per insurance rep this was denied on 01/02/2020. They will be faxing the denial letter to us.    PRIOR AUTHORIZATION DENIED    Medication: sildenafil (REVATIO) 20 MG tablet    Denial Date:  01/02/2020    Denial Rational: letter to come    Appeal Information: letter to come

## 2020-01-31 DIAGNOSIS — N52.9 ED (ERECTILE DYSFUNCTION): Primary | ICD-10-CM

## 2020-01-31 RX ORDER — SILDENAFIL CITRATE 20 MG/1
20 TABLET ORAL PRN
Qty: 30 TABLET | Refills: 3 | Status: SHIPPED | OUTPATIENT
Start: 2020-01-31 | End: 2020-10-01

## 2020-03-10 ENCOUNTER — TELEPHONE (OUTPATIENT)
Dept: PODIATRY | Facility: CLINIC | Age: 65
End: 2020-03-10

## 2020-03-10 NOTE — TELEPHONE ENCOUNTER
Patient is scheduled with me on 3/16.   Please let him know that we may not be able to treat this in the clinic the day of his appointment and we may need to schedule him for surgery which will not be same day.   I have not encountered this before but from research they jiggers are usually removed in surgery.      Please let patient know.       Kristen Mitchell DPM

## 2020-03-10 NOTE — TELEPHONE ENCOUNTER
Called patient and discussed the providers recommendation. Patient is scheduled for 3/16 and will keep appointment.    Guanakito Burger MA on 3/10/2020 at 3:24 PM

## 2020-03-11 ENCOUNTER — HEALTH MAINTENANCE LETTER (OUTPATIENT)
Age: 65
End: 2020-03-11

## 2020-03-16 ENCOUNTER — OFFICE VISIT (OUTPATIENT)
Dept: PODIATRY | Facility: CLINIC | Age: 65
End: 2020-03-16
Payer: MEDICARE

## 2020-03-16 VITALS
HEIGHT: 72 IN | WEIGHT: 190 LBS | BODY MASS INDEX: 25.73 KG/M2 | SYSTOLIC BLOOD PRESSURE: 138 MMHG | DIASTOLIC BLOOD PRESSURE: 80 MMHG

## 2020-03-16 DIAGNOSIS — M79.672 LEFT FOOT PAIN: Primary | ICD-10-CM

## 2020-03-16 DIAGNOSIS — B88.9: ICD-10-CM

## 2020-03-16 PROCEDURE — 11730 AVULSION NAIL PLATE SIMPLE 1: CPT | Mod: T1 | Performed by: PODIATRIST

## 2020-03-16 PROCEDURE — 88305 TISSUE EXAM BY PATHOLOGIST: CPT | Performed by: PODIATRIST

## 2020-03-16 PROCEDURE — 99203 OFFICE O/P NEW LOW 30 MIN: CPT | Mod: 25 | Performed by: PODIATRIST

## 2020-03-16 PROCEDURE — 88312 SPECIAL STAINS GROUP 1: CPT | Performed by: PODIATRIST

## 2020-03-16 RX ORDER — CLINDAMYCIN HCL 300 MG
300 CAPSULE ORAL 3 TIMES DAILY
Qty: 21 CAPSULE | Refills: 0 | Status: SHIPPED | OUTPATIENT
Start: 2020-03-16 | End: 2020-06-22

## 2020-03-16 ASSESSMENT — MIFFLIN-ST. JEOR: SCORE: 1679.83

## 2020-03-16 NOTE — PATIENT INSTRUCTIONS
Thank you for choosing [facility] / Foot & Ankle Surgery!    DR. VILLANUEVA'S CLINIC SCHEDULE  MONDAY AM - [place] TUESDAY - [place]   [address] [address] [address] [address] [place] [address]   [phone] / [phone] / [phone]       WEDNESDAY - [place] FRIDAY AM - [facility]   [address]   [place] [address] [place] [address]   [phone] / [phone]       FRIDAY PM - [place] SCHEDULE SURGERY: [phone]   [address] BILLING QUESTIONS: [phone]   [place] [address] AFTER HOURS: [phone]   [phone] / [phone] CONSUMER PRICE LINE: [phone]     APPOINTMENTS: [phone]     Follow up: As needed        Please read through the following handouts and if you have any questions, please feel free to call us or send a [employer] message!          INGROWN TOENAILS  When a toenail is ingrown, it is curved and grows into the skin, usually at the nail borders (the sides of the nail). This  digging in  of the nail irritates the skin, often creating pain, redness, swelling, and warmth in the toe.  If an ingrown nail causes a break in the skin, bacteria may enter and cause an infection in the area, which is often marked by drainage and a foul odor. However, even if the toe isn t painful, red, swollen, or warm, a nail that curves downward into the skin can progress to an infection.  CAUSES:  Heredity: In many people, the tendency for ingrown toenails is inherited.   Trauma: Sometimes an ingrown toenail is the result of trauma, such as stubbing your toe, having an object fall on your toe, or engaging in activities that involve repeated pressure on the toes, such as kicking or running.   Improper Trimming:  The most common cause of ingrown toenails is cutting your nails too short. This encourages the skin next to the nail to fold over the nail.   Improperly Sized Footwear:  Ingrown toenails can result from wearing socks and shoes that are tight or short.   Nail Conditions: Ingrown toenails can be caused by nail problems, such as fungal infections or losing a nail due to trauma.   TREATMENT: Sometimes initial treatment for ingrown toenails can be safely performed at home. However, home treatment is strongly discouraged if an infection is suspected, or for those who have medical conditions that put feet at high risk, such as diabetes, nerve damage in the foot, or poor circulation.  Home care: If you don t have an infection or any of the above medical conditions, you can soak your foot in room-temperature water (adding Epsom s salt may be recommended by your doctor), and gently massage the side of the nail fold to help reduce the inflammation.  Avoid attempting  bathroom surgery.  Repeated cutting of the nail can cause the condition to worsen over time. If your symptoms fail to improve, it s time to see a foot and ankle surgeon.  Physician care: After examining the toe, the foot and ankle surgeon will select the treatment best suited for you. If an infection is present, an oral antibiotic may be prescribed.  Sometimes a minor surgical procedure, often performed in the office, will ease the pain and remove the offending nail. After applying a local anesthetic, the doctor removes part of the nail s side border. Some nails may become ingrown again, requiring removal of the nail root.  Following the nail procedure, a light bandage will be applied. Most people experience very little pain after surgery and may resume normal activity the next day. If your surgeon has prescribed an oral antibiotic, be sure to take all the medication, even if your symptoms have improved.  PREVENTION:  Proper Trimming: Cut toenails in a fairly straight line, and don t cut them too short. You should be able to get your fingernail under the sides and end of the nail.   Well-fitting Footwear: Don t wear shoes that are  short or tight in the toe area. Avoid shoes that are loose, because they too cause pressure on the toes, especially when running or walking briskly.     INGROWN TOENAIL REMOVAL AFTERCARE     Go directly home and elevate the affected foot on one or two pillows for the remainder of the day/evening if possible. Your toe may stay numb anywhere from 2-8 hours.     Take Tylenol, ibuprofen or another anti-inflammatory as needed for pain.     Take antibiotic if that has been prescribed. Finish the entire prescribed antibiotic even if your symptoms have improved.     The evening of the procedure, soak/wash the affected area in warm water (you may add Epsom salt) for 5 to 10 minutes. Do this twice a day for 2-4 weeks (6-8 weeks if you had phenol) (you may count showering/bathing as one soak).  After soaks, pat the area dry and then allow to airdry for a few minutes. Apply antibiotic ointment to the area and cover with 2 X 2 gauze and paper tape or band-aid.    You may pursue everyday activities as tolerated with either an open toe shoe or cut-out shoe as needed or you may wear regular shoes if no pain is noted.    Watch for any signs and symptoms of infection such as: redness, red streaks going up the foot/leg, swelling, pus or foul odor. Those that have had the phenol procedure, the toe will drain longer and will look like it is infected because it is a chemical burn.     Please call with questions.              BODY WEIGHT AND YOUR FEET  The following information is included in the after visit summary for all patients. Body weight can be a sensitive issue to discuss in clinic, but we think the following information is very important. Although we focus on the feet and ankles, we do support the overall health of our patients.     Many things can cause foot and ankle problems. Foot structure, activity level, foot mechanics and injuries are common causes of pain. One very important issue that often goes unmentioned, is body  weight. Extra weight can cause increased stress on muscles, ligaments, bones and tendons. Sometimes just a few extra pounds is all it takes to put one over her/his threshold. Without reducing that stress, it can be difficult to alleviate pain. As Foot & Ankle specialists, our job is addressing the lower extremity problem and possible causes. Regarding extra body weight, we encourage patients to discuss diet and weight management plans with their primary care doctors. It is this team approach that gives you the best opportunity for pain relief and getting you back on your feet.      [facility] has a Comprehensive Weight Management Program. This program includes counseling, education, non-surgical and surgical approaches to weight loss. If you are interested in learning more either talk to you primary care provider or call [phone].

## 2020-03-16 NOTE — PROGRESS NOTES
PATIENT HISTORY:  Joseph Grissom is a 64 year old male who presents to clinic for a parasite in his left second toe.  He notes is been there for 2 months.  He goes to [facility] quite often and can usually remove them himself however this 1 is quite large and a little sore.  It does itch.  Pain is 2 out of 10.  Usually it is when he wants to go to sleep at night.  He takes Tylenol which does help.  Would like it removed today.    Review of Systems:  Patient denies fever, chills, rash, wound, stiffness, limping, numbness, weakness, heart burn, blood in stool, chest pain with activity, calf pain when walking, shortness of breath with activity, chronic cough, easy bleeding/bruising, swelling of ankles, excessive thirst, fatigue, depression, anxiety.      PAST MEDICAL HISTORY:   Past Medical History:   Diagnosis Date     Hyperlipidemia      Mumps         PAST SURGICAL HISTORY:   Past Surgical History:   Procedure Laterality Date     APPENDECTOMY       FACIAL RECONSTRUCTION SURGERY      had cheek bone fixed after trauma     HERNIA REPAIR       VASECTOMY       VASECTOMY       vasectomy reversal       VASOVASOSTOMY          MEDICATIONS:   Current Outpatient Medications:      fexofenadine (ALLEGRA) 180 MG tablet, Take 180 mg by mouth daily, Disp: , Rfl:      magnesium 250 MG tablet, Take 1 tablet by mouth daily, Disp: , Rfl:      Melatonin 300 MCG TABS, , Disp: , Rfl:      Multiple Vitamins-Minerals (MULTIVITAMIN ADULT PO), , Disp: , Rfl:      naphazoline-pheniramine (OPCON-A/VISINE A/NAPHCON A) SOLN ophthalmic solution, 1-2 drops 4 times daily as needed for irritation, Disp: , Rfl:      pantothenic acid 500 MG TABS, Take 500 mg by mouth, Disp: , Rfl:      sildenafil (REVATIO) 20 MG tablet, Take 1 tablet (20 mg) by mouth as needed (Take one pill daily as needed for erectile dyfunction), Disp: 30 tablet, Rfl: 3     sildenafil (REVATIO) 20 MG tablet, Take 1 tablet (20 mg) by mouth daily as needed (erectile dysfunction),  Disp: 30 tablet, Rfl: 3     Triamcinolone Acetonide (NASACORT ALLERGY 24HR NA), , Disp: , Rfl:      ALLERGIES:    Allergies   Allergen Reactions     Eggs [Chicken-Derived Products (Egg)]      Penicillins         SOCIAL HISTORY:   Social History     Socioeconomic History     Marital status:      Spouse name: Not on file     Number of children: Not on file     Years of education: Not on file     Highest education level: Not on file   Occupational History     Not on file   Social Needs     Financial resource strain: Not on file     Food insecurity     Worry: Not on file     Inability: Not on file     Transportation needs     Medical: Not on file     Non-medical: Not on file   Tobacco Use     Smoking status: Never Smoker     Smokeless tobacco: Never Used   Substance and Sexual Activity     Alcohol use: Yes     Frequency: 2-3 times a week     Drinks per session: 1 or 2     Drug use: Not Currently     Sexual activity: Yes   Lifestyle     Physical activity     Days per week: Not on file     Minutes per session: Not on file     Stress: Not on file   Relationships     Social connections     Talks on phone: Not on file     Gets together: Not on file     Attends Holiness service: Not on file     Active member of club or organization: Not on file     Attends meetings of clubs or organizations: Not on file     Relationship status: Not on file     Intimate partner violence     Fear of current or ex partner: Not on file     Emotionally abused: Not on file     Physically abused: Not on file     Forced sexual activity: Not on file   Other Topics Concern     Parent/sibling w/ CABG, MI or angioplasty before 65F 55M? Not Asked   Social History Narrative     Not on file        FAMILY HISTORY:   Family History   Problem Relation Age of Onset     Cancer Brother      Hypertension Mother      Hypertension Father      Diabetes Father      Skin Cancer Father      Alzheimer Disease Father      Diabetes Brother         EXAM:Vitals: Ht  1.829 m (6')   Wt 86.2 kg (190 lb)   BMI 25.77 kg/m    BMI= Body mass index is 25.77 kg/m .    General appearance: Patient is alert and fully cooperative with history & exam.  No sign of distress is noted during the visit.     Psychiatric: Affect is pleasant & appropriate.  Patient appears motivated to improve health.     Respiratory: Breathing is regular & unlabored while sitting.     HEENT: Hearing is intact to spoken word.  Speech is clear.  No gross evidence of visual impairment that would impact ambulation.     Dermatologic: The left second toenail has dried subungual hematoma and the nail is partially Onikul lysed.  There is a large bulbous mass noted to the distal aspect of the left second toe.  This is the parasite exact.  Minimal localized redness.  No purulent drainage noted.     Vascular: DP & PT pulses are intact & regular bilaterally.  No significant edema or varicosities noted.  CFT and skin temperature is normal to both lower extremities.     Neurologic: Lower extremity sensation is intact to light touch.  No evidence of weakness or contracture in the lower extremities.  No evidence of neuropathy.     Musculoskeletal: Patient is ambulatory without assistive device or brace.  No gross ankle deformity noted.  No foot or ankle joint effusion is noted.     ASSESSMENT:    Left foot pain  Parasitic infection of skin     PLAN:  Reviewed patient's chart in epic.  At this time we discussed removal.  Explained that if we cannot remove this in clinic that we would likely have to schedule this for surgery.  Patient is in agreement.  He will soak the foot twice a day for 2 weeks and apply Neosporin and a Band-Aid afterwards.  We discussed that we will have to remove the nail as it is attached to the lesion and fairly loose on the toe.  We will also do oral antibiotics given that he is on radiation to try to help prevent future infection.  He was told to call with further questions or concerns.    Procedure:  After verbal consent the left second toe was anesthetized with 5 cc of half percent Marcaine plain.  The foot was prepped and draped using sterile technique.  Attention was directed to the distal dorsal aspect of the left second toe.  A hemostat was used to release the second toenail from the nail bed and remove the nail in total.  With removal of the nail the large parasitic skin sac was removed in total.  There was no further parasitic lesions noted.  The foot was placed in a dry sterile dressing.  Patient tolerated procedure well.       Kristen Mitchell DPM, Podiatry/Foot and Ankle Surgery    Weight management plan: Patient was referred to their PCP to discuss a diet and exercise plan.

## 2020-03-16 NOTE — LETTER
3/16/2020         RE: Joseph Grissom  [address]  [place] [address]        Dear Colleague,    Thank you for referring your patient, Joseph Grissom, to the [facility] [place]. Please see a copy of my visit note below.    PATIENT HISTORY:  Joseph Grissom is a 64 year old male who presents to clinic for a parasite in his left second toe.  He notes is been there for 2 months.  He goes to [facility] quite often and can usually remove them himself however this 1 is quite large and a little sore.  It does itch.  Pain is 2 out of 10.  Usually it is when he wants to go to sleep at night.  He takes Tylenol which does help.  Would like it removed today.    Review of Systems:  Patient denies fever, chills, rash, wound, stiffness, limping, numbness, weakness, heart burn, blood in stool, chest pain with activity, calf pain when walking, shortness of breath with activity, chronic cough, easy bleeding/bruising, swelling of ankles, excessive thirst, fatigue, depression, anxiety.      PAST MEDICAL HISTORY:   Past Medical History:   Diagnosis Date     Hyperlipidemia      Mumps         PAST SURGICAL HISTORY:   Past Surgical History:   Procedure Laterality Date     APPENDECTOMY       FACIAL RECONSTRUCTION SURGERY      had cheek bone fixed after trauma     HERNIA REPAIR       VASECTOMY       VASECTOMY       vasectomy reversal       VASOVASOSTOMY          MEDICATIONS:   Current Outpatient Medications:      fexofenadine (ALLEGRA) 180 MG tablet, Take 180 mg by mouth daily, Disp: , Rfl:      magnesium 250 MG tablet, Take 1 tablet by mouth daily, Disp: , Rfl:      Melatonin 300 MCG TABS, , Disp: , Rfl:      Multiple Vitamins-Minerals (MULTIVITAMIN ADULT PO), , Disp: , Rfl:      naphazoline-pheniramine (OPCON-A/VISINE A/NAPHCON A) SOLN ophthalmic solution, 1-2 drops 4 times daily as needed for irritation, Disp: , Rfl:      pantothenic acid 500 MG TABS, Take 500 mg by mouth, Disp: , Rfl:      sildenafil (REVATIO)  20 MG tablet, Take 1 tablet (20 mg) by mouth as needed (Take one pill daily as needed for erectile dyfunction), Disp: 30 tablet, Rfl: 3     sildenafil (REVATIO) 20 MG tablet, Take 1 tablet (20 mg) by mouth daily as needed (erectile dysfunction), Disp: 30 tablet, Rfl: 3     Triamcinolone Acetonide (NASACORT ALLERGY 24HR NA), , Disp: , Rfl:      ALLERGIES:    Allergies   Allergen Reactions     Eggs [Chicken-Derived Products (Egg)]      Penicillins         SOCIAL HISTORY:   Social History     Socioeconomic History     Marital status:      Spouse name: Not on file     Number of children: Not on file     Years of education: Not on file     Highest education level: Not on file   Occupational History     Not on file   Social Needs     Financial resource strain: Not on file     Food insecurity     Worry: Not on file     Inability: Not on file     Transportation needs     Medical: Not on file     Non-medical: Not on file   Tobacco Use     Smoking status: Never Smoker     Smokeless tobacco: Never Used   Substance and Sexual Activity     Alcohol use: Yes     Frequency: 2-3 times a week     Drinks per session: 1 or 2     Drug use: Not Currently     Sexual activity: Yes   Lifestyle     Physical activity     Days per week: Not on file     Minutes per session: Not on file     Stress: Not on file   Relationships     Social connections     Talks on phone: Not on file     Gets together: Not on file     Attends Sabianist service: Not on file     Active member of club or organization: Not on file     Attends meetings of clubs or organizations: Not on file     Relationship status: Not on file     Intimate partner violence     Fear of current or ex partner: Not on file     Emotionally abused: Not on file     Physically abused: Not on file     Forced sexual activity: Not on file   Other Topics Concern     Parent/sibling w/ CABG, MI or angioplasty before 65F 55M? Not Asked   Social History Narrative     Not on file        FAMILY  HISTORY:   Family History   Problem Relation Age of Onset     Cancer Brother      Hypertension Mother      Hypertension Father      Diabetes Father      Skin Cancer Father      Alzheimer Disease Father      Diabetes Brother         EXAM:Vitals: Ht 1.829 m (6')   Wt 86.2 kg (190 lb)   BMI 25.77 kg/m    BMI= Body mass index is 25.77 kg/m .    General appearance: Patient is alert and fully cooperative with history & exam.  No sign of distress is noted during the visit.     Psychiatric: Affect is pleasant & appropriate.  Patient appears motivated to improve health.     Respiratory: Breathing is regular & unlabored while sitting.     HEENT: Hearing is intact to spoken word.  Speech is clear.  No gross evidence of visual impairment that would impact ambulation.     Dermatologic: The left second toenail has dried subungual hematoma and the nail is partially Onikul lysed.  There is a large bulbous mass noted to the distal aspect of the left second toe.  This is the parasite exact.  Minimal localized redness.  No purulent drainage noted.     Vascular: DP & PT pulses are intact & regular bilaterally.  No significant edema or varicosities noted.  CFT and skin temperature is normal to both lower extremities.     Neurologic: Lower extremity sensation is intact to light touch.  No evidence of weakness or contracture in the lower extremities.  No evidence of neuropathy.     Musculoskeletal: Patient is ambulatory without assistive device or brace.  No gross ankle deformity noted.  No foot or ankle joint effusion is noted.     ASSESSMENT:    Left foot pain  Parasitic infection of skin     PLAN:  Reviewed patient's chart in epic.  At this time we discussed removal.  Explained that if we cannot remove this in clinic that we would likely have to schedule this for surgery.  Patient is in agreement.  He will soak the foot twice a day for 2 weeks and apply Neosporin and a Band-Aid afterwards.  We discussed that we will have to remove  the nail as it is attached to the lesion and fairly loose on the toe.  We will also do oral antibiotics given that he is on radiation to try to help prevent future infection.  He was told to call with further questions or concerns.    Procedure: After verbal consent the left second toe was anesthetized with 5 cc of half percent Marcaine plain.  The foot was prepped and draped using sterile technique.  Attention was directed to the distal dorsal aspect of the left second toe.  A hemostat was used to release the second toenail from the nail bed and remove the nail in total.  With removal of the nail the large parasitic skin sac was removed in total.  There was no further parasitic lesions noted.  The foot was placed in a dry sterile dressing.  Patient tolerated procedure well.       Kristen Mitchell DPM, Podiatry/Foot and Ankle Surgery    Weight management plan: Patient was referred to their PCP to discuss a diet and exercise plan.      Again, thank you for allowing me to participate in the care of your patient.        Sincerely,        Kristen Mitchell DPM, Podiatry/Foot and Ankle Surgery

## 2020-03-19 LAB — COPATH REPORT: NORMAL

## 2020-04-30 ENCOUNTER — TRANSFERRED RECORDS (OUTPATIENT)
Dept: HEALTH INFORMATION MANAGEMENT | Facility: CLINIC | Age: 65
End: 2020-04-30

## 2020-06-19 ENCOUNTER — TELEPHONE (OUTPATIENT)
Dept: UROLOGY | Facility: CLINIC | Age: 65
End: 2020-06-19

## 2020-06-19 DIAGNOSIS — C61 PROSTATE CANCER (H): ICD-10-CM

## 2020-06-19 PROCEDURE — 36415 COLL VENOUS BLD VENIPUNCTURE: CPT | Performed by: UROLOGY

## 2020-06-19 PROCEDURE — 84153 ASSAY OF PSA TOTAL: CPT | Performed by: UROLOGY

## 2020-06-19 NOTE — TELEPHONE ENCOUNTER
Called to screen for CODVID-19 symptoms prior to Monday's visit.  He does not have any symptoms at this time.    Lara Begum, EMT

## 2020-06-20 LAB — PSA SERPL-MCNC: 0.09 UG/L (ref 0–4)

## 2020-06-22 ENCOUNTER — OFFICE VISIT (OUTPATIENT)
Dept: UROLOGY | Facility: CLINIC | Age: 65
End: 2020-06-22
Payer: MEDICARE

## 2020-06-22 VITALS
SYSTOLIC BLOOD PRESSURE: 134 MMHG | BODY MASS INDEX: 25.73 KG/M2 | DIASTOLIC BLOOD PRESSURE: 90 MMHG | WEIGHT: 190 LBS | HEIGHT: 72 IN

## 2020-06-22 DIAGNOSIS — R39.15 URINARY URGENCY: ICD-10-CM

## 2020-06-22 DIAGNOSIS — R39.9 UTI SYMPTOMS: Primary | ICD-10-CM

## 2020-06-22 DIAGNOSIS — C61 PROSTATE CANCER (H): Primary | ICD-10-CM

## 2020-06-22 DIAGNOSIS — C61 PROSTATE CANCER (H): ICD-10-CM

## 2020-06-22 DIAGNOSIS — R30.0 BURNING WITH URINATION: ICD-10-CM

## 2020-06-22 LAB
ALBUMIN UR-MCNC: NEGATIVE MG/DL
APPEARANCE UR: CLEAR
BILIRUB UR QL STRIP: NEGATIVE
COLOR UR AUTO: YELLOW
GLUCOSE UR STRIP-MCNC: NEGATIVE MG/DL
HGB UR QL STRIP: NEGATIVE
KETONES UR STRIP-MCNC: NEGATIVE MG/DL
LEUKOCYTE ESTERASE UR QL STRIP: NEGATIVE
NITRATE UR QL: NEGATIVE
PH UR STRIP: 5.5 PH (ref 5–7)
RESIDUAL VOLUME (RV) (EXTERNAL): 8
SOURCE: NORMAL
SP GR UR STRIP: 1.01 (ref 1–1.03)
UROBILINOGEN UR STRIP-ACNC: 0.2 EU/DL (ref 0.2–1)

## 2020-06-22 PROCEDURE — 51798 US URINE CAPACITY MEASURE: CPT | Performed by: UROLOGY

## 2020-06-22 PROCEDURE — 96402 CHEMO HORMON ANTINEOPL SQ/IM: CPT | Performed by: UROLOGY

## 2020-06-22 PROCEDURE — 99214 OFFICE O/P EST MOD 30 MIN: CPT | Mod: 25 | Performed by: UROLOGY

## 2020-06-22 PROCEDURE — 81003 URINALYSIS AUTO W/O SCOPE: CPT | Mod: QW | Performed by: UROLOGY

## 2020-06-22 ASSESSMENT — PAIN SCALES - GENERAL: PAINLEVEL: NO PAIN (0)

## 2020-06-22 ASSESSMENT — MIFFLIN-ST. JEOR: SCORE: 1679.83

## 2020-06-22 NOTE — LETTER
6/22/2020       RE: Joseph Grissom  [address]  [place] [address]     Dear Colleague,    Thank you for referring your patient, Joseph Grissom, to the [facility] [place] at [facility]. Please see a copy of my visit note below.    Office Visit Note  [facility]  [phone]    UROLOGIC DIAGNOSES:   High risk prostate cancer    CURRENT INTERVENTIONS:   Radiotherapy plus planned 3 years hormonal therapy    HISTORY:   Gisselle returns to clinic today for prostate cancer follow-up.  He completed his prostate radiotherapy on April 30.  He tolerated the treatment well.  He has had some burning and frequency with urination but otherwise has done well.  He has had some hot flashes from the Lupron injection as well.  His PSA is now at 0.09.      Despite being on Lupron he is still having some erectile function.  He is using Viagra at the 25 mg dose with good success.      PAST MEDICAL HISTORY:   Past Medical History:   Diagnosis Date     Hyperlipidemia      Mumps        PAST SURGICAL HISTORY:   Past Surgical History:   Procedure Laterality Date     APPENDECTOMY       FACIAL RECONSTRUCTION SURGERY      had cheek bone fixed after trauma     HERNIA REPAIR       VASECTOMY       VASECTOMY       vasectomy reversal       VASOVASOSTOMY         FAMILY HISTORY:   Family History   Problem Relation Age of Onset     Cancer Brother      Hypertension Mother      Hypertension Father      Diabetes Father      Skin Cancer Father      Alzheimer Disease Father      Diabetes Brother        SOCIAL HISTORY:   Social History     Socioeconomic History     Marital status:      Spouse name: None     Number of children: None     Years of education: None     Highest education level: None   Occupational History     None   Social Needs     Financial resource strain: None     Food insecurity     Worry: None     Inability: None     Transportation  needs     Medical: None     Non-medical: None   Tobacco Use     Smoking status: Never Smoker     Smokeless tobacco: Never Used   Substance and Sexual Activity     Alcohol use: Yes     Frequency: 2-3 times a week     Drinks per session: 1 or 2     Drug use: Not Currently     Sexual activity: Yes   Lifestyle     Physical activity     Days per week: None     Minutes per session: None     Stress: None   Relationships     Social connections     Talks on phone: None     Gets together: None     Attends Buddhist service: None     Active member of club or organization: None     Attends meetings of clubs or organizations: None     Relationship status: None     Intimate partner violence     Fear of current or ex partner: None     Emotionally abused: None     Physically abused: None     Forced sexual activity: None   Other Topics Concern     Parent/sibling w/ CABG, MI or angioplasty before 65F 55M? Not Asked   Social History Narrative     None       Review Of Systems:  Skin: No rash, pruritis, or skin pigmentation  Eyes: No changes in vision  Ears/Nose/Throat: No changes in hearing, no nosebleeds  Respiratory: No shortness of breath, dyspnea on exertion, cough, or hemoptysis  Cardiovascular: No chest pain or palpitations  Gastrointestinal: No diarrhea or constipation. No abdominal pain. No hematochezia  Genitourinary: see HPI  Musculoskeletal: No pain or swelling of joints, normal range of motion  Neurologic: No weakness or tremors  Psychiatric: No recent changes in memory or mood  Hematologic/Lymphatic/Immunologic: No easy bruising or enlarged lymph nodes  Endocrine: No weight gain or loss      PHYSICAL EXAM:    BP (!) 134/90   Ht 1.829 m (6')   Wt 86.2 kg (190 lb)   BMI 25.77 kg/m      Constitutional: Well developed. Conversant and in no acute distress  Eyes: Anicteric sclera, conjunctiva clear, normal extraocular movements  ENT: Normocephalic and atraumatic,   Skin: Warm and dry. No rashes or lesions  Cardiac: No  peripheral edema  Back/Flank: Not done  CNS/PNS: Normal musculature and movements, moves all extremities normally  Respiratory: Normal non-labored breathing  Abdomen: Soft nontender and nondistended  Peripheral Vascular: No peripheral edema  Mental Status/Psych: Alert and Oriented x 3. Normal mood and affect    Penis: Not done  Scrotal Skin: Not done  Testicles: Not done  Epididymis: Not done  Digital Rectal Exam:     Cystoscopy: Not done    Imaging: None    Urinalysis: UA RESULTS:  Recent Labs   Lab Test 10/14/19  1312   COLOR Yellow   APPEARANCE Clear   URINEGLC Negative   URINEBILI Negative   URINEKETONE Negative   SG 1.020   UBLD Negative   URINEPH 8.5*   PROTEIN Negative   UROBILINOGEN 0.2   NITRITE Negative   LEUKEST Negative     PSA: 0.09    Post Void Residual: 8mL    Other labs: None today    IMPRESSION:  Doing well    PLAN:  He tolerated the radiotherapy well and he is tolerating his hormonal therapy well.  We discussed the treatment plan.  I recommended that we stick with the treatment plan to complete 3 years of hormonal therapy surrounding his radiotherapy.  We discussed potential referral to medical oncology at this time as well.  He will plan to defer on any referral to medical oncology as long as the PSA continues to go down.  Today he received his second out of 6 planned injections of Lupron.  He will return in 6 months for his next PSA and Lupron injection.  Eamon Cano M.D.

## 2020-06-22 NOTE — NURSING NOTE
Chief Complaint   Patient presents with     Prostate Cancer     Here for 6 mo Lupron     Currently experiencing some difficulty holding urine and also having some burning with urination.    PVR: 8 ml    The following medication was given:     MEDICATION:  Lupron Depot 45 mg  ROUTE: IM  SITE: LUQ - Gluteus  DOSE: 45mg  LOT #: [id number]  : Eileen  EXPIRATION DATE: 3JUN2022  NDC#: 4616-0340-07  Was there drug waste? No  Multi-dose vial: Meredith Begum, ASHLEY  June 22, 2020

## 2020-06-22 NOTE — PROGRESS NOTES
Office Visit Note  [facility]  [phone]    UROLOGIC DIAGNOSES:   High risk prostate cancer    CURRENT INTERVENTIONS:   Radiotherapy plus planned 3 years hormonal therapy    HISTORY:   Gisselle returns to clinic today for prostate cancer follow-up.  He completed his prostate radiotherapy on April 30.  He tolerated the treatment well.  He has had some burning and frequency with urination but otherwise has done well.  He has had some hot flashes from the Lupron injection as well.  His PSA is now at 0.09.      Despite being on Lupron he is still having some erectile function.  He is using Viagra at the 25 mg dose with good success.      PAST MEDICAL HISTORY:   Past Medical History:   Diagnosis Date     Hyperlipidemia      Mumps        PAST SURGICAL HISTORY:   Past Surgical History:   Procedure Laterality Date     APPENDECTOMY       FACIAL RECONSTRUCTION SURGERY      had cheek bone fixed after trauma     HERNIA REPAIR       VASECTOMY       VASECTOMY       vasectomy reversal       VASOVASOSTOMY         FAMILY HISTORY:   Family History   Problem Relation Age of Onset     Cancer Brother      Hypertension Mother      Hypertension Father      Diabetes Father      Skin Cancer Father      Alzheimer Disease Father      Diabetes Brother        SOCIAL HISTORY:   Social History     Socioeconomic History     Marital status:      Spouse name: None     Number of children: None     Years of education: None     Highest education level: None   Occupational History     None   Social Needs     Financial resource strain: None     Food insecurity     Worry: None     Inability: None     Transportation needs     Medical: None     Non-medical: None   Tobacco Use     Smoking status: Never Smoker     Smokeless tobacco: Never Used   Substance and Sexual Activity     Alcohol use: Yes     Frequency: 2-3 times a week     Drinks per session: 1 or 2     Drug use: Not Currently     Sexual activity: Yes   Lifestyle     Physical  activity     Days per week: None     Minutes per session: None     Stress: None   Relationships     Social connections     Talks on phone: None     Gets together: None     Attends Evangelical service: None     Active member of club or organization: None     Attends meetings of clubs or organizations: None     Relationship status: None     Intimate partner violence     Fear of current or ex partner: None     Emotionally abused: None     Physically abused: None     Forced sexual activity: None   Other Topics Concern     Parent/sibling w/ CABG, MI or angioplasty before 65F 55M? Not Asked   Social History Narrative     None       Review Of Systems:  Skin: No rash, pruritis, or skin pigmentation  Eyes: No changes in vision  Ears/Nose/Throat: No changes in hearing, no nosebleeds  Respiratory: No shortness of breath, dyspnea on exertion, cough, or hemoptysis  Cardiovascular: No chest pain or palpitations  Gastrointestinal: No diarrhea or constipation. No abdominal pain. No hematochezia  Genitourinary: see HPI  Musculoskeletal: No pain or swelling of joints, normal range of motion  Neurologic: No weakness or tremors  Psychiatric: No recent changes in memory or mood  Hematologic/Lymphatic/Immunologic: No easy bruising or enlarged lymph nodes  Endocrine: No weight gain or loss      PHYSICAL EXAM:    BP (!) 134/90   Ht 1.829 m (6')   Wt 86.2 kg (190 lb)   BMI 25.77 kg/m      Constitutional: Well developed. Conversant and in no acute distress  Eyes: Anicteric sclera, conjunctiva clear, normal extraocular movements  ENT: Normocephalic and atraumatic,   Skin: Warm and dry. No rashes or lesions  Cardiac: No peripheral edema  Back/Flank: Not done  CNS/PNS: Normal musculature and movements, moves all extremities normally  Respiratory: Normal non-labored breathing  Abdomen: Soft nontender and nondistended  Peripheral Vascular: No peripheral edema  Mental Status/Psych: Alert and Oriented x 3. Normal mood and affect    Penis: Not  done  Scrotal Skin: Not done  Testicles: Not done  Epididymis: Not done  Digital Rectal Exam:     Cystoscopy: Not done    Imaging: None    Urinalysis: UA RESULTS:  Recent Labs   Lab Test 10/14/19  1312   COLOR Yellow   APPEARANCE Clear   URINEGLC Negative   URINEBILI Negative   URINEKETONE Negative   SG 1.020   UBLD Negative   URINEPH 8.5*   PROTEIN Negative   UROBILINOGEN 0.2   NITRITE Negative   LEUKEST Negative       PSA: 0.09    Post Void Residual: 8mL    Other labs: None today      IMPRESSION:  Doing well    PLAN:  He tolerated the radiotherapy well and he is tolerating his hormonal therapy well.  We discussed the treatment plan.  I recommended that we stick with the treatment plan to complete 3 years of hormonal therapy surrounding his radiotherapy.  We discussed potential referral to medical oncology at this time as well.  He will plan to defer on any referral to medical oncology as long as the PSA continues to go down.  Today he received his second out of 6 planned injections of Lupron.  He will return in 6 months for his next PSA and Lupron injection.      Eamon Cano M.D.

## 2020-07-06 ENCOUNTER — VIRTUAL VISIT (OUTPATIENT)
Dept: FAMILY MEDICINE | Facility: CLINIC | Age: 65
End: 2020-07-06
Payer: MEDICARE

## 2020-07-06 DIAGNOSIS — G44.209 ACUTE NON INTRACTABLE TENSION-TYPE HEADACHE: ICD-10-CM

## 2020-07-06 DIAGNOSIS — M79.10 MYALGIA: Primary | ICD-10-CM

## 2020-07-06 DIAGNOSIS — C61 PROSTATE CANCER (H): ICD-10-CM

## 2020-07-06 PROCEDURE — 99214 OFFICE O/P EST MOD 30 MIN: CPT | Performed by: PHYSICIAN ASSISTANT

## 2020-07-06 RX ORDER — LEUPROLIDE ACETATE 1 MG/0.2ML
KIT SUBCUTANEOUS
COMMUNITY
Start: 2019-12-20 | End: 2020-10-05

## 2020-07-06 NOTE — PROGRESS NOTES
"Joseph Grissom is a 64 year old male who is being evaluated via a billable video visit.      The patient has been notified of following:     \"This video visit will be conducted via a call between you and your physician/provider. We have found that certain health care needs can be provided without the need for an in-person physical exam.  This service lets us provide the care you need with a video conversation.  If a prescription is necessary we can send it directly to your pharmacy.  If lab work is needed we can place an order for that and you can then stop by our lab to have the test done at a later time.    Video visits are billed at different rates depending on your insurance coverage.  Please reach out to your insurance provider with any questions.    If during the course of the call the physician/provider feels a video visit is not appropriate, you will not be charged for this service.\"    Patient has given verbal consent for Video visit? Yes  How would you like to obtain your AVS? Laura  Patient would like the video invitation sent by: Text to cell phone: Laura  Will anyone else be joining your video visit? No    Subjective     Joseph Grissom is a 64 year old male who presents today via video visit for the following health issues:    HPI  Acute Illness   Acute illness concerns: headache, body aches, sore throat  Onset: started Saturday and got worse yesterday    Fever: no    Chills/Sweats: does get hot flashes but he is on hormone therapy because of prostate cancer treatment    Headache (location?): YES    Sinus Pressure:YES    Conjunctivitis:  no    Ear Pain: no    Rhinorrhea: YES, believes allergy related    Congestion: no    Sore Throat: YES since this morning     Cough: no    Wheeze: no    Decreased Appetite: no    Nausea: YES    Vomiting: no    Diarrhea:  YES, loose stools - yesterday    Dysuria/Freq.: no    Fatigue/Achiness: YES    Sick/Strep Exposure: no     Therapies Tried and outcome: Vit " C, 1/2 tablet of diphenhydramine at nighttime    Requested a visit due to HA, sinus pressure, nasal symptoms, sore throat, and body aches/fatigue.  Symptoms started two days ago and worsened yesterday  Woke up with a sore throat this morning. Sore throat improved some after eating breakfast and taking Vitamin C.  Possible fever this morning, didn't check temp  HA started two days ago, worsened yesterday. Temporal headache.  Sinus pressure this morning. Nasal congestion yesterday  Uses nasacort for environmental allergies.  Having some body aches. States that his heel is hurting, which he attributes [place] over [place]. Was up north with his children and grandchildren. No known exposure to illness.    Undergoing treatment for prostate cancer. Last Lupron injection 6/22. Has done radiation treatments.    Tried to exercise yesterday and day before    Increase in stool frequency yesterday, with some gas. States that he does coffee enemas.    Video Start Time: 9:47    Patient Active Problem List   Diagnosis     Prostate cancer (H)     Past Surgical History:   Procedure Laterality Date     APPENDECTOMY       FACIAL RECONSTRUCTION SURGERY      had cheek bone fixed after trauma     HERNIA REPAIR       VASECTOMY       VASECTOMY       vasectomy reversal       VASOVASOSTOMY         Social History     Tobacco Use     Smoking status: Never Smoker     Smokeless tobacco: Never Used   Substance Use Topics     Alcohol use: Yes     Frequency: 2-3 times a week     Drinks per session: 1 or 2     Family History   Problem Relation Age of Onset     Cancer Brother      Hypertension Mother      Hypertension Father      Diabetes Father      Skin Cancer Father      Alzheimer Disease Father      Diabetes Brother          Current Outpatient Medications   Medication Sig Dispense Refill     Diphenhydramine-APAP 12.5-500 MG TABS        leuprolide acetate (LUPRON) 1 MG/0.2ML kit        Black Cohosh 80  MG CAPS        fexofenadine (ALLEGRA) 180 MG tablet Take 180 mg by mouth daily       magnesium 250 MG tablet Take 1 tablet by mouth daily       Melatonin 300 MCG TABS        Multiple Vitamins-Minerals (MULTIVITAMIN ADULT PO)        naphazoline-pheniramine (OPCON-A/VISINE A/NAPHCON A) SOLN ophthalmic solution 1-2 drops 4 times daily as needed for irritation       pantothenic acid 500 MG TABS Take 500 mg by mouth       sildenafil (REVATIO) 20 MG tablet Take 1 tablet (20 mg) by mouth as needed (Take one pill daily as needed for erectile dyfunction) (Patient taking differently: Take 25 mg by mouth as needed (Take one pill daily as needed for erectile dyfunction) ) 30 tablet 3     sildenafil (REVATIO) 20 MG tablet Take 1 tablet (20 mg) by mouth daily as needed (erectile dysfunction) (Patient not taking: Reported on 6/22/2020) 30 tablet 3     Triamcinolone Acetonide (NASACORT ALLERGY 24HR NA)        Allergies   Allergen Reactions     Eggs [Chicken-Derived Products (Egg)]      Penicillins        Reviewed and updated as needed this visit by Provider         Review of Systems   Constitutional, HEENT, cardiovascular, pulmonary, gi and gu systems are negative, except as otherwise noted.      Objective             Physical Exam     GENERAL: Healthy, alert and no distress  EYES: Eyes grossly normal to inspection.  No discharge or erythema, or obvious scleral/conjunctival abnormalities.  RESP: No audible wheeze, cough, or visible cyanosis.  No visible retractions or increased work of breathing.    SKIN: Visible skin clear. No significant rash, abnormal pigmentation or lesions.  NEURO: Cranial nerves grossly intact.  Mentation and speech appropriate for age.  PSYCH: Mentation appears normal, affect normal/bright, judgement and insight intact, normal speech and appearance well-groomed.      Diagnostic Test Results:  none         Assessment & Plan     1. Myalgia  Based on his symptoms and prostate cancer, recommend testing for  COVID-19. Order entered. We discussed other possible causes of his symptoms. Recommend staying at home until test results are known. Also recommend monitoring for new/worsening symptoms, including fevers, cough, difficulty breathing. He is in agreement with this plan.  - Symptomatic COVID-19 Virus (Coronavirus) by PCR; Future    2. Acute non intractable tension-type headache  See above. Tries to avoid taking medications. Plans to increase his fluid intake.  - Symptomatic COVID-19 Virus (Coronavirus) by PCR; Future    3. Prostate cancer (H)  Treatment plan includes radiation therapy + hormonal therapy (Lupron). Gets hot flashes from hormone therapy. Denies an acute increase in hot flashes.    No follow-ups on file.    Violetta Sosa PA-C  [facility] [place]      Video-Visit Details    Type of service:  Video Visit    Video End Time:10:08 AM    Originating Location (pt. Location): Home    Distant Location (provider location):  [facility]     Platform used for Video Visit: Doximity    No follow-ups on file.       Violetta Sosa PA-C

## 2020-07-13 DIAGNOSIS — M79.10 MYALGIA: ICD-10-CM

## 2020-07-13 DIAGNOSIS — G44.209 ACUTE NON INTRACTABLE TENSION-TYPE HEADACHE: ICD-10-CM

## 2020-07-13 PROCEDURE — 99207 ZZC NO BILLABLE SERVICE THIS VISIT: CPT

## 2020-07-13 PROCEDURE — U0003 INFECTIOUS AGENT DETECTION BY NUCLEIC ACID (DNA OR RNA); SEVERE ACUTE RESPIRATORY SYNDROME CORONAVIRUS 2 (SARS-COV-2) (CORONAVIRUS DISEASE [COVID-19]), AMPLIFIED PROBE TECHNIQUE, MAKING USE OF HIGH THROUGHPUT TECHNOLOGIES AS DESCRIBED BY CMS-2020-01-R: HCPCS | Performed by: PHYSICIAN ASSISTANT

## 2020-07-14 LAB
SARS-COV-2 RNA SPEC QL NAA+PROBE: NOT DETECTED
SPECIMEN SOURCE: NORMAL

## 2020-10-01 ENCOUNTER — MYC MEDICAL ADVICE (OUTPATIENT)
Dept: FAMILY MEDICINE | Facility: CLINIC | Age: 65
End: 2020-10-01

## 2020-10-01 NOTE — PROGRESS NOTES
Pre-Visit Planning - SB3 assignment has NOT been verified. Please review at visit if able. Thank you.    Chart review: Last OV with PCP, Dr. Holcomb on 8/5/19. PSA was found to be elevated and patient was referred to Urology. Further labs and imaging were completed and patient was diagnosed with prostate cancer. He has been following up regularly with Urology since that time.     Most recent visit with Urology was on 6/22/20: IMPRESSION:  Doing well  PLAN:  He tolerated the radiotherapy well and he is tolerating his hormonal therapy well.  We discussed the treatment plan.  I recommended that we stick with the treatment plan to complete 3 years of hormonal therapy surrounding his radiotherapy.  We discussed potential referral to medical oncology at this time as well.  He will plan to defer on any referral to medical oncology as long as the PSA continues to go down.  Today he received his second out of 6 planned injections of Lupron.  He will return in 6 months for his next PSA and Lupron injection.  Eamon Cano M.D.    Of note, patient was seen for virtual visit on 7/6/20 with Violetta Sosa PA-C:  1. Myalgia  Based on his symptoms and prostate cancer, recommend testing for COVID-19. Order entered. We discussed other possible causes of his symptoms. Recommend staying at home until test results are known. Also recommend monitoring for new/worsening symptoms, including fevers, cough, difficulty breathing. He is in agreement with this plan.  - Symptomatic COVID-19 Virus (Coronavirus) by PCR; Future  2. Acute non intractable tension-type headache  See above. Tries to avoid taking medications. Plans to increase his fluid intake.  - Symptomatic COVID-19 Virus (Coronavirus) by PCR; Future  3. Prostate cancer (H)  Treatment plan includes radiation therapy + hormonal therapy (Lupron). Gets hot flashes from hormone therapy. Denies an acute increase in hot flashes.  COVID testing was negative.    Appointment Notes for  this encounter:   fasting, physical- tested in June for PSA. son @ 8:50--last was more than 1 year ago    Questionnaires Reviewed/Assigned  No additional questionnaires are needed    [employer] message sent to patient. Awaiting response.   JYOTI Fink, RN  [facility]      Patient responded via [employer]. Are there any additional questions or concerns you'd like to review with your provider during your visit? Yes: would like to discuss allergies, taking antihistamines for headaches and a lump in the right upper hair line of his head    Patient does have additional questions or concerns. Adjusted Appointment Notes.       Visit is preventive.     Meds  Is there anything on your medication list that needs to be updated? No    Current Outpatient Medications   Medication     Black Cohosh 80 MG CAPS     Diphenhydramine-APAP 12.5-500 MG TABS     fexofenadine (ALLEGRA) 180 MG tablet     leuprolide acetate (LUPRON) 1 MG/0.2ML kit     magnesium 250 MG tablet     Melatonin 300 MCG TABS     Multiple Vitamins-Minerals (MULTIVITAMIN ADULT PO)     naphazoline-pheniramine (OPCON-A/VISINE A/NAPHCON A) SOLN ophthalmic solution     pantothenic acid 500 MG TABS     sildenafil (REVATIO) 20 MG tablet     Triamcinolone Acetonide (NASACORT ALLERGY 24HR NA)     No current facility-administered medications for this visit.      Which pharmacy do you prefer to use for medications during this visit if needed? [facility] mail order for long-term meds and [facility] [place] for acute needs    Do you need refills on any of your medications? No    Health Maintenance Due   Topic Date Due     ANNUAL REVIEW OF HM ORDERS  1955     ADVANCE CARE PLANNING  1955     COLORECTAL CANCER SCREENING  08/25/1963     ZOSTER IMMUNIZATION (1 of 2) 08/25/2003     Pneumococcal Vaccine: 65+ Years (1 of 1 - PPSV23) 08/25/2018     PHQ-2  01/01/2020     MEDICARE ANNUAL WELLNESS VISIT  08/05/2020     FALL RISK ASSESSMENT  08/05/2020     INFLUENZA  "VACCINE (1) 09/01/2020     Patient is due for:  see health maintenance above.  Appointment scheduled.    [employer]  Patient is active on [employer].    Questionnaire Review   Offered information on completing questionnaires via [employer].    Call Summary  \"Thank you for your time today.  If anything comes up before your appointment, please feel free to contact us at [phone].\"  JYOTI Fink, RN  [facility]    "

## 2020-10-04 ASSESSMENT — ACTIVITIES OF DAILY LIVING (ADL): CURRENT_FUNCTION: NO ASSISTANCE NEEDED

## 2020-10-05 ENCOUNTER — OFFICE VISIT (OUTPATIENT)
Dept: FAMILY MEDICINE | Facility: CLINIC | Age: 65
End: 2020-10-05
Payer: MEDICARE

## 2020-10-05 VITALS
HEART RATE: 71 BPM | WEIGHT: 203 LBS | OXYGEN SATURATION: 99 % | HEIGHT: 72 IN | TEMPERATURE: 97.8 F | BODY MASS INDEX: 27.5 KG/M2 | DIASTOLIC BLOOD PRESSURE: 82 MMHG | SYSTOLIC BLOOD PRESSURE: 132 MMHG

## 2020-10-05 DIAGNOSIS — Z23 NEED FOR PROPHYLACTIC VACCINATION AND INOCULATION AGAINST INFLUENZA: ICD-10-CM

## 2020-10-05 DIAGNOSIS — R52 PAIN: ICD-10-CM

## 2020-10-05 DIAGNOSIS — N53.12 DYSPAREUNIA, MALE: ICD-10-CM

## 2020-10-05 DIAGNOSIS — E78.5 HYPERLIPIDEMIA, UNSPECIFIED HYPERLIPIDEMIA TYPE: ICD-10-CM

## 2020-10-05 DIAGNOSIS — Z00.00 ENCOUNTER FOR PREVENTATIVE ADULT HEALTH CARE EXAMINATION: Primary | ICD-10-CM

## 2020-10-05 DIAGNOSIS — Z13.1 SCREENING FOR DIABETES MELLITUS: ICD-10-CM

## 2020-10-05 LAB
CRP SERPL-MCNC: 4.6 MG/L (ref 0–8)
ERYTHROCYTE [SEDIMENTATION RATE] IN BLOOD BY WESTERGREN METHOD: 28 MM/H (ref 0–20)

## 2020-10-05 PROCEDURE — 36415 COLL VENOUS BLD VENIPUNCTURE: CPT | Performed by: FAMILY MEDICINE

## 2020-10-05 PROCEDURE — 90682 RIV4 VACC RECOMBINANT DNA IM: CPT | Performed by: FAMILY MEDICINE

## 2020-10-05 PROCEDURE — 80053 COMPREHEN METABOLIC PANEL: CPT | Performed by: FAMILY MEDICINE

## 2020-10-05 PROCEDURE — 99213 OFFICE O/P EST LOW 20 MIN: CPT | Mod: 25 | Performed by: FAMILY MEDICINE

## 2020-10-05 PROCEDURE — 82550 ASSAY OF CK (CPK): CPT | Performed by: FAMILY MEDICINE

## 2020-10-05 PROCEDURE — 80061 LIPID PANEL: CPT | Performed by: FAMILY MEDICINE

## 2020-10-05 PROCEDURE — 86431 RHEUMATOID FACTOR QUANT: CPT | Performed by: FAMILY MEDICINE

## 2020-10-05 PROCEDURE — 99397 PER PM REEVAL EST PAT 65+ YR: CPT | Mod: 25 | Performed by: FAMILY MEDICINE

## 2020-10-05 PROCEDURE — 86038 ANTINUCLEAR ANTIBODIES: CPT | Performed by: FAMILY MEDICINE

## 2020-10-05 PROCEDURE — G0008 ADMIN INFLUENZA VIRUS VAC: HCPCS | Performed by: FAMILY MEDICINE

## 2020-10-05 PROCEDURE — 86140 C-REACTIVE PROTEIN: CPT | Performed by: FAMILY MEDICINE

## 2020-10-05 PROCEDURE — 85652 RBC SED RATE AUTOMATED: CPT | Performed by: FAMILY MEDICINE

## 2020-10-05 ASSESSMENT — MIFFLIN-ST. JEOR: SCORE: 1733.8

## 2020-10-05 ASSESSMENT — ACTIVITIES OF DAILY LIVING (ADL): CURRENT_FUNCTION: NO ASSISTANCE NEEDED

## 2020-10-05 NOTE — PROGRESS NOTES
"SUBJECTIVE:   Joseph Grissom is a 65 year old male who presents for Preventive Visit.    Patient has been advised of split billing requirements and indicates understanding: Yes   Are you in the first 12 months of your Medicare coverage?  No    Healthy Habits:     In general, how would you rate your overall health?  Good    Frequency of exercise:  2-3 days/week    Duration of exercise:  15-30 minutes    Do you usually eat at least 4 servings of fruit and vegetables a day, include whole grains    & fiber and avoid regularly eating high fat or \"junk\" foods?  Yes    Taking medications regularly:  Yes    Medication side effects:  Muscle aches    Ability to successfully perform activities of daily living:  No assistance needed    Home Safety:  Lack of grab bars in the bathroom    Hearing Impairment:  Difficulty following a conversation in a noisy restaurant or crowded room, feel that people are mumbling or not speaking clearly, need to ask people to speak up or repeat themselves, find that men's voices are easier to understand than woman's, difficulty understanding soft or whispered speech and difficulty understanding speech on the telephone    In the past 6 months, have you been bothered by leaking of urine? Yes    In general, how would you rate your overall mental or emotional health?  Good      PHQ-2 Total Score: 1    Additional concerns today:  Yes      Allergies - takes antihistamines (allegra - takes 1/4 tab throughout the day --rarely takes a full tab) and then benadryl at night. This seems to help with headaches. Tylenol doesn't seem to be as effective. Headache frontal in location. No significant congestion or runny nose.       Diagnosed RA when he was 33 has a general pain. He typically exercises regularly. Was using exercise trampoline. Has continued being sore 2 weeks later. Pain has interfered with regular exercise of running/walking. Pain was located in low back and buttock area. If he does activity that " is out of his normal routine will have more pain in affected areas.    Pain with intercourse: he states he was having issues with erections and was prescribed sildenafil. However, he is able to manage this without medications with increased foreplay. He recently noticed pain with penetration. There is no pain with the erection but with penetration is caused pain. He states they regularly use lubricant.    Do you feel safe in your environment? YES    Have you ever done Advance Care Planning? (For example, a Health Directive, POLST, or a discussion with a medical provider or your loved ones about your wishes): No, advance care planning information given to patient to review.  Patient plans to discuss their wishes with loved ones or provider.        Fall risk  Fallen 2 or more times in the past year?: No  Any fall with injury in the past year?: No    Cognitive Screening   1) Repeat 3 items (Leader, Season, Table)      2) Clock draw:   NORMAL  3) 3 item recall:   Recalls 3 objects  Results: ABNORMAL clock, 1-2 items recalled: PROBABLE COGNITIVE IMPAIRMENT, **INFORM PROVIDER**    Mini-CogTM Copyright NICOLE Dacosta. Licensed by the author for use in [facility]; reprinted with permission (elza@[facility]). All rights reserved.      Do you have sleep apnea, excessive snoring or daytime drowsiness?: yes Snoring     Reviewed and updated as needed this visit by clinical staff  Tobacco  Allergies  Meds   Med Hx  Surg Hx  Fam Hx  Soc Hx        Reviewed and updated as needed this visit by Provider  Tobacco  Allergies              Social History     Tobacco Use     Smoking status: Never Smoker     Smokeless tobacco: Never Used   Substance Use Topics     Alcohol use: Yes     Frequency: 2-3 times a week     Drinks per session: 1 or 2     If you drink alcohol do you typically have >3 drinks per day or >7 drinks per week? No    Alcohol Use 10/5/2020   Prescreen: >3 drinks/day or >7 drinks/week? -   Prescreen: >3  drinks/day or >7 drinks/week? No       Current providers sharing in care for this patient include:   Patient Care Team:  Twin Holcomb DO as PCP - General (Family Practice)  Eamon Cano MD as MD (Urology)  Violetta Sosa PA-C as Assigned PCP    The following health maintenance items are reviewed in Epic and correct as of today:  Health Maintenance   Topic Date Due     ANNUAL REVIEW OF HM ORDERS  1955     COLORECTAL CANCER SCREENING  08/25/1963     ZOSTER IMMUNIZATION (1 of 2) 08/25/2003     Pneumococcal Vaccine: 65+ Years (1 of 1 - PPSV23) 08/25/2018     FALL RISK ASSESSMENT  08/05/2020     INFLUENZA VACCINE (1) 09/01/2020     MEDICARE ANNUAL WELLNESS VISIT  10/05/2021     LIPID  10/14/2024     ADVANCE CARE PLANNING  10/05/2025     DTAP/TDAP/TD IMMUNIZATION (2 - Td) 08/05/2029     HEPATITIS C SCREENING  Completed     PHQ-2  Completed     AORTIC ANEURYSM SCREENING (SYSTEM ASSIGNED)  Completed     Pneumococcal Vaccine: Pediatrics (0 to 5 Years) and At-Risk Patients (6 to 64 Years)  Aged Out     IPV IMMUNIZATION  Aged Out     MENINGITIS IMMUNIZATION  Aged Out     HEPATITIS B IMMUNIZATION  Aged Out     Lab work is in process      Review of Systems  Constitutional, HEENT, cardiovascular, pulmonary, gi and gu systems are negative, except as otherwise noted.    OBJECTIVE:   /82   Pulse 71   Temp 97.8  F (36.6  C) (Tympanic)   Ht 1.829 m (6')   Wt 92.1 kg (203 lb)   SpO2 99%   BMI 27.53 kg/m   Estimated body mass index is 27.53 kg/m  as calculated from the following:    Height as of this encounter: 1.829 m (6').    Weight as of this encounter: 92.1 kg (203 lb).  Physical Exam  GENERAL: healthy, alert and no distress  HENT: ear canals and TM's normal, nose and mouth without ulcers or lesions  NECK: no adenopathy, no asymmetry, masses, or scars and thyroid normal to palpation  RESP: lungs clear to auscultation - no rales, rhonchi or wheezes  CV: regular rate and rhythm, normal S1 S2, no  S3 or S4, no murmur, click or rub, no peripheral edema and peripheral pulses strong  ABDOMEN: soft, nontender, no hepatosplenomegaly, no masses and bowel sounds normal   (male): penis normal without urethral discharge and small area of redness right below glans  MS: no gross musculoskeletal defects noted, no edema  SKIN: no suspicious lesions or rashes  PSYCH: mentation appears normal, affect normal/bright    Diagnostic Test Results:  none     ASSESSMENT / PLAN:   1. Encounter for preventative adult health care examination: health maintenance reviewed and updated. Will scan colonoscopy result from [facility] into chart -- plan to repeat in 2022.    2. Pain: unclear etiology for symptoms, however, has previous diagnosis of RA per his report. Could consider muscular etiology for pain as well but will first recheck labs.  - Rheumatoid factor  - CK total  - Anti Nuclear Jennifer IgG by IFA with Reflex  - CRP, inflammation  - ESR: Erythrocyte sedimentation rate    3. Hyperlipidemia, unspecified hyperlipidemia type  - Lipid panel reflex to direct LDL Fasting    4. Screening for diabetes mellitus  - Comprehensive metabolic panel (BMP + Alb, Alk Phos, ALT, AST, Total. Bili, TP)    5. Need for prophylactic vaccination and inoculation against influenza  - Vaccine Administration, Initial [51382]  - FLU VAC, QUADRIVALENT (RIV4) RECOMBINANT DNA, IM    6. Dyspareunia, male: unclear etiology. He and his wife are going to try different activities to see if he still has pain. He will also get in touch with his urologist.       Patient has been advised of split billing requirements and indicates understanding: Yes  COUNSELING:  Reviewed preventive health counseling, as reflected in patient instructions       Regular exercise       Healthy diet/nutrition    Estimated body mass index is 27.53 kg/m  as calculated from the following:    Height as of this encounter: 1.829 m (6').    Weight as of this encounter: 92.1 kg (203 lb).        He  reports that he has never smoked. He has never used smokeless tobacco.      Appropriate preventive services were discussed with this patient, including applicable screening as appropriate for cardiovascular disease, diabetes, osteopenia/osteoporosis, and glaucoma.  As appropriate for age/gender, discussed screening for colorectal cancer, prostate cancer, breast cancer, and cervical cancer. Checklist reviewing preventive services available has been given to the patient.    Reviewed patients plan of care and provided an AVS. The Basic Care Plan (routine screening as documented in Health Maintenance) for Joseph meets the Care Plan requirement. This Care Plan has been established and reviewed with the Patient.    Counseling Resources:  ATP IV Guidelines  Pooled Cohorts Equation Calculator  Breast Cancer Risk Calculator  Breast Cancer: Medication to Reduce Risk  FRAX Risk Assessment  ICSI Preventive Guidelines  Dietary Guidelines for Americans, 2010  USDA's MyPlate  ASA Prophylaxis  Lung CA Screening    DO EDEN Peters [facility]    Identified Health Risks:

## 2020-10-06 LAB
ALBUMIN SERPL-MCNC: 3.8 G/DL (ref 3.4–5)
ALP SERPL-CCNC: 119 U/L (ref 40–150)
ALT SERPL W P-5'-P-CCNC: 31 U/L (ref 0–70)
ANA SER QL IF: NEGATIVE
ANION GAP SERPL CALCULATED.3IONS-SCNC: 3 MMOL/L (ref 3–14)
AST SERPL W P-5'-P-CCNC: 23 U/L (ref 0–45)
BILIRUB SERPL-MCNC: 0.5 MG/DL (ref 0.2–1.3)
BUN SERPL-MCNC: 16 MG/DL (ref 7–30)
CALCIUM SERPL-MCNC: 9.3 MG/DL (ref 8.5–10.1)
CHLORIDE SERPL-SCNC: 107 MMOL/L (ref 94–109)
CHOLEST SERPL-MCNC: 195 MG/DL
CK SERPL-CCNC: 69 U/L (ref 30–300)
CO2 SERPL-SCNC: 27 MMOL/L (ref 20–32)
CREAT SERPL-MCNC: 0.87 MG/DL (ref 0.66–1.25)
GFR SERPL CREATININE-BSD FRML MDRD: 89 ML/MIN/{1.73_M2}
GLUCOSE SERPL-MCNC: 104 MG/DL (ref 70–99)
HDLC SERPL-MCNC: 35 MG/DL
LDLC SERPL CALC-MCNC: 82 MG/DL
NONHDLC SERPL-MCNC: 160 MG/DL
POTASSIUM SERPL-SCNC: 4 MMOL/L (ref 3.4–5.3)
PROT SERPL-MCNC: 8.6 G/DL (ref 6.8–8.8)
RHEUMATOID FACT SER NEPH-ACNC: 9 IU/ML (ref 0–20)
SODIUM SERPL-SCNC: 137 MMOL/L (ref 133–144)
TRIGL SERPL-MCNC: 388 MG/DL

## 2020-10-07 DIAGNOSIS — Z87.39 HISTORY OF ARTHRITIS: ICD-10-CM

## 2020-10-07 DIAGNOSIS — R52 PAIN: Primary | ICD-10-CM

## 2020-10-09 ENCOUNTER — MYC MEDICAL ADVICE (OUTPATIENT)
Dept: FAMILY MEDICINE | Facility: CLINIC | Age: 65
End: 2020-10-09

## 2020-10-12 NOTE — TELEPHONE ENCOUNTER
Symptoms very well could be musculoskeletal from change in activity or an acute injury like a muscle strain. It would be reasonable to continue symptomatic management of his pain for the next couple weeks but if still persisting or having increased pain in different areas, I would recommend a consult with rheumatology.    Twin Holcomb DO  10/12/2020 5:35 PM

## 2020-11-04 ENCOUNTER — TELEPHONE (OUTPATIENT)
Dept: FAMILY MEDICINE | Facility: CLINIC | Age: 65
End: 2020-11-04

## 2020-11-04 DIAGNOSIS — M54.50 BILATERAL LOW BACK PAIN WITHOUT SCIATICA, UNSPECIFIED CHRONICITY: Primary | ICD-10-CM

## 2020-11-04 NOTE — TELEPHONE ENCOUNTER
Reason for call:  Symptom   Symptom or request: Back pain    Duration (how long have symptoms been present): Since May, worsening since September  Have you been treated for this before? Yes    Additional comments: Patient was seen in October for lower back pain, and it is continuing to get worse. Patient is wondering if he should come into the clinic and be seen with Dr. Holcomb again or if there are other options, such as physical therapy.     Phone number to reach patient:  Cell number on file:    Telephone Information:   Mobile [phone]       Best Time:  anytime    Can we leave a detailed message on this number?  YES    Travel screening: Not Applicable

## 2020-11-04 NOTE — TELEPHONE ENCOUNTER
Call placed to [facility], advised of referral placement and he will be getting a call to schedule. He had no further questions at this time. Carmen Rhoades R.N.

## 2020-11-04 NOTE — TELEPHONE ENCOUNTER
I think it would be reasonable to start physical therapy. I'll place a referral.    Twin Holcomb,   11/4/2020 1:04 PM

## 2020-11-04 NOTE — TELEPHONE ENCOUNTER
Dr. Holcomb, please see message below and advise. Also related 10/9/20 encounter.     Thank you, Carmen Rhoades R.N.

## 2020-11-10 ENCOUNTER — THERAPY VISIT (OUTPATIENT)
Dept: PHYSICAL THERAPY | Facility: CLINIC | Age: 65
End: 2020-11-10
Attending: FAMILY MEDICINE
Payer: MEDICARE

## 2020-11-10 DIAGNOSIS — M54.50 BILATERAL LOW BACK PAIN WITHOUT SCIATICA, UNSPECIFIED CHRONICITY: ICD-10-CM

## 2020-11-10 PROCEDURE — 97112 NEUROMUSCULAR REEDUCATION: CPT | Mod: GP | Performed by: PHYSICAL THERAPIST

## 2020-11-10 PROCEDURE — 97110 THERAPEUTIC EXERCISES: CPT | Mod: GP | Performed by: PHYSICAL THERAPIST

## 2020-11-10 PROCEDURE — 97161 PT EVAL LOW COMPLEX 20 MIN: CPT | Mod: GP | Performed by: PHYSICAL THERAPIST

## 2020-11-10 NOTE — LETTER
DEPARTMENT OF HEALTH AND HUMAN SERVICES  CENTERS FOR MEDICARE & MEDICAID SERVICES    PLAN/UPDATED PLAN OF PROGRESS FOR OUTPATIENT REHABILITATION       PATIENTS NAME:  Joseph Grissom   : 1955  PROVIDER NUMBER:    [id number]  HICN:  [id number]  PROVIDER NAME: [place] [place]  MEDICAL RECORD NUMBER: [id number]   START OF CARE DATE:  SOC Date: 11/10/20   TYPE:  PT  PRIMARY/TREATMENT DIAGNOSIS: Bilateral low back pain without sciatica, unspecified chronicity  VISITS FROM START OF CARE:  Rxs Used: 1     [place] for Athletic [facility] Initial Evaluation  Subjective:  The history is provided by the patient. No  was used.   Patient Health History  Joseph Grissom being seen for Lower back & upper leg (buttocks) pain.     Problem began: 6/3/2020.   Problem occurred: lifting cement bags or biking up steep hill or cutting down tree branches???   Pain is reported as 5/10 on pain scale.  General health as reported by patient is good.  Pertinent medical history includes: cancer, high blood pressure, migraines/headaches, overweight and pain at night/rest.   Red flags:  None as reported by patient.  Medical allergies: none.   Surgeries include:  Cancer surgery and other. Other surgery history details: They are in my records, I hope..    Current medications:  Other. Other medications details: antihistamines, Acetaminophen.  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                Therapist Generated HPI Evaluation       Type of problem:  Lumbar.  This is a new condition.  Condition occurred with:  Lifting.  Where condition occurred: at home (yard work, lifting cement bags).  Patient reports pain:  Central lumbar spine.  Pain is described as aching and is intermittent.  Pain radiates to:  Gluteals left, gluteals right, thigh right and thigh left. Pain is the same all the time.  Since onset  "symptoms are gradually improving.  Associated symptoms:  Loss of motion/stiffness. Symptoms are exacerbated by lifting and certain positions (running)  and relieved by activity/movement and analgesics.  Restrictions due to condition include:  Working in normal job without restrictions.  Barriers include:  None as reported by patient.    PATIENTS NAME:  Joseph Grissom   : 1955                   Objective:  Gait:    Gait Type:  Normal   Weight Bearing Status:  WBAT   Assistive Devices:  None  Lumbar/SI Evaluation  ROM:  Arom wnl lumbar: repeated R SG no change.  prone lying good, KATY 30-60\" x 2 better during.  AROM Lumbar:   Flexion:          Min loss pulling B thigh, after pain R LBP  Ext:                    Mod loss, tight but not to painful   Side Bend:        Left:  Min loss pain R    Right:  Min loss less pain, pulls R  Rotation:           Left:     Right:   Side Glide:        Left:     Right:       Strength: abdominal 4/5, glut 4/5  Lumbar Myotomes:  normal  Lumbar Dermtomes:  normal  Neural Tension/Mobility:    Left side:SLR  negative.   Right side:   SLR (slight at 50 deg) positive.    Assessment/Plan:    Patient is a 65 year old male with lumbar complaints.    Patient has the following significant findings with corresponding treatment plan.                Diagnosis 1:  LBP  Pain -  hot/cold therapy, US, electric stimulation, manual therapy, self management, education, directional preference exercise and home program  Decreased ROM/flexibility - manual therapy and therapeutic exercise  Decreased strength - therapeutic exercise and therapeutic activities  Decreased function - therapeutic activities  Previous and current functional limitations:  (See Goal Flow Sheet for this information)    Short term and Long term goals: (See Goal Flow Sheet for this information)   Communication ability:  Patient appears to be able to clearly communicate and understand verbal and written communication and follow " "directions correctly.  Treatment Explanation - The following has been discussed with the patient:   RX ordered/plan of care  Anticipated outcomes  Possible risks and side effects  This patient would benefit from PT intervention to resume normal activities.   Rehab potential is good.  Frequency:  1 X week, once daily  Duration:  for 6 weeks  Discharge Plan:  Achieve all LTG.  Independent in home treatment program.  Reach maximal therapeutic benefit.                  PATIENTS NAME:  Joseph Grissom   : 1955      Caregiver Signature/Credentials _____________________________ Date ________       Treating Provider: Daniel Yarbrough PT     I have reviewed and certified the need for these services and plan of treatment while under my care.       PHYSICIAN'S SIGNATURE:   _____________________________________  Date___________                       Twin Holcomb DO    Certification period:  Beginning of Cert date period: 11/10/20 to  End of Cert period date: 21     Functional Level Progress Report: Please see attached \"Goal Flow sheet for Functional level.\"    ____X____ Continue Services or       ________ DC Services                Service dates: From  SOC Date: 11/10/20 date to present                         "

## 2020-11-10 NOTE — LETTER
DEPARTMENT OF HEALTH AND HUMAN SERVICES  CENTERS FOR MEDICARE & MEDICAID SERVICES    PLAN/UPDATED PLAN OF PROGRESS FOR OUTPATIENT REHABILITATION       PATIENTS NAME:  Joseph Grissom   : 1955  PROVIDER NUMBER:    [id number]  HICN:    PROVIDER NAME: [place] [facility] [place]  MEDICAL RECORD NUMBER: [id number]   START OF CARE DATE:  SOC Date: 20   TYPE:  PT  PRIMARY/TREATMENT DIAGNOSIS: Bilateral low back pain without sciatica, unspecified chronicity  VISITS FROM START OF CARE:  Rxs Used: 1     [place] for Athletic [facility] Initial Evaluation  Subjective:  The history is provided by the patient. No  was used.   Patient Health History  Joseph Grissom being seen for Lower back & upper leg (buttocks) pain.     Problem began: 6/3/2020.   Problem occurred: lifting cement bags or biking up steep hill or cutting down tree branches???   Pain is reported as 5/10 on pain scale.  General health as reported by patient is good.  Pertinent medical history includes: cancer, high blood pressure, migraines/headaches, overweight and pain at night/rest.   Red flags:  None as reported by patient.  Medical allergies: none.   Surgeries include:  Cancer surgery and other. Other surgery history details: They are in my records, I hope..    Current medications:  Other. Other medications details: antihistamines, Acetaminophen.    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                Therapist Generated HPI Evaluation       Type of problem:  Lumbar.  This is a new condition.  Condition occurred with:  Lifting.  Where condition occurred: at home (yard work, lifting cement bags).  Patient reports pain:  Central lumbar spine.  Pain is described as aching and is intermittent.  Pain radiates to:  Gluteals left, gluteals right, thigh right and thigh left. Pain is the same all the time.  Since onset symptoms  "are gradually improving.  Associated symptoms:  Loss of motion/stiffness. Symptoms are exacerbated by lifting and certain positions (running)  and relieved by activity/movement and analgesics.  Restrictions due to condition include:  Working in normal job without restrictions.  Barriers include:  None as reported by patient.  PATIENTS NAME:  Joseph Grissom   : 1955             Objective:  Gait:    Gait Type:  Normal   Weight Bearing Status:  WBAT   Assistive Devices:  None  Lumbar/SI Evaluation  ROM:  Arom wnl lumbar: repeated R SG no change.  prone lying good, KATY 30-60\" x 2 better during.  AROM Lumbar:   Flexion:          Min loss pulling B thigh, after pain R LBP  Ext:                    Mod loss, tight but not to painful   Side Bend:        Left:  Min loss pain R    Right:  Min loss less pain, pulls R  Rotation:           Left:     Right:   Side Glide:        Left:     Right:       Strength: abdominal 4/5, glut 4/5  Lumbar Myotomes:  normal  Lumbar Dermtomes:  normal  Neural Tension/Mobility:    Left side:SLR  negative.   Right side:   SLR (slight at 50 deg) positive.    Assessment/Plan:    Patient is a 65 year old male with lumbar complaints.    Patient has the following significant findings with corresponding treatment plan.                Diagnosis 1:  LBP  Pain -  hot/cold therapy, US, electric stimulation, manual therapy, self management, education, directional preference exercise and home program  Decreased ROM/flexibility - manual therapy and therapeutic exercise  Decreased strength - therapeutic exercise and therapeutic activities  Decreased function - therapeutic activities  Previous and current functional limitations:  (See Goal Flow Sheet for this information)    Short term and Long term goals: (See Goal Flow Sheet for this information)     Communication ability:  Patient appears to be able to clearly communicate and understand verbal and written communication and follow directions " "correctly.  Treatment Explanation - The following has been discussed with the patient:   RX ordered/plan of care  Anticipated outcomes  Possible risks and side effects  This patient would benefit from PT intervention to resume normal activities.   Rehab potential is good.    Frequency:  1 X week, once daily  Duration:  for 6 weeks  Discharge Plan:  Achieve all LTG.  Independent in home treatment program.  Reach maximal therapeutic benefit.                PATIENTS NAME:  Joseph Grissom   : 1955      Caregiver Signature/Credentials _____________________________ Date ________       Treating Provider: Daniel Yarbrough PT     I have reviewed and certified the need for these services and plan of treatment while under my care.      PHYSICIAN'S SIGNATURE:   _____________________________________  Date___________                Twin Holcomb DO    Certification period:  Beginning of Cert date period: 20 to  End of Cert period date: 21     Functional Level Progress Report: Please see attached \"Goal Flow sheet for Functional level.\"    ____X____ Continue Services or       ________ DC Services                Service dates: From  SOC Date: 20 date to present                         "

## 2020-11-10 NOTE — PROGRESS NOTES
[facility] Initial Evaluation  Subjective:  The history is provided by the patient. No  was used.   Patient Health History  Joseph Grissom being seen for Lower back & upper leg (buttocks) pain.     Problem began: 6/3/2020.   Problem occurred: lifting cement bags or biking up steep hill or cutting down tree branches???   Pain is reported as 5/10 on pain scale.  General health as reported by patient is good.  Pertinent medical history includes: cancer, high blood pressure, migraines/headaches, overweight and pain at night/rest.   Red flags:  None as reported by patient.  Medical allergies: none.   Surgeries include:  Cancer surgery and other. Other surgery history details: They are in my records, I hope..    Current medications:  Other. Other medications details: antihistamines, Acetaminophen.    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                  Therapist Generated HPI Evaluation         Type of problem:  Lumbar.    This is a new condition.  Condition occurred with:  Lifting.  Where condition occurred: at home (yard work, lifting cement bags).  Patient reports pain:  Central lumbar spine.  Pain is described as aching and is intermittent.  Pain radiates to:  Gluteals left, gluteals right, thigh right and thigh left. Pain is the same all the time.  Since onset symptoms are gradually improving.  Associated symptoms:  Loss of motion/stiffness. Symptoms are exacerbated by lifting and certain positions (running)  and relieved by activity/movement and analgesics.      Restrictions due to condition include:  Working in normal job without restrictions.  Barriers include:  None as reported by patient.                        Objective:    Gait:    Gait Type:  Normal   Weight Bearing Status:  WBAT   Assistive Devices:  None                 Lumbar/SI Evaluation  ROM:  Arom wnl lumbar:  "repeated R SG no change.  prone lying good, KATY 30-60\" x 2 better during.  AROM Lumbar:   Flexion:          Min loss pulling B thigh, after pain R LBP  Ext:                    Mod loss, tight but not to painful   Side Bend:        Left:  Min loss pain R    Right:  Min loss less pain, pulls R  Rotation:           Left:     Right:   Side Glide:        Left:     Right:         Strength: abdominal 4/5, glut 4/5  Lumbar Myotomes:  normal                Lumbar Dermtomes:  normal                Neural Tension/Mobility:      Left side:SLR  negative.   Right side:   SLR (slight at 50 deg) positive.                                                       General     ROS    Assessment/Plan:    Patient is a 65 year old male with lumbar complaints.    Patient has the following significant findings with corresponding treatment plan.                Diagnosis 1:  LBP  Pain -  hot/cold therapy, US, electric stimulation, manual therapy, self management, education, directional preference exercise and home program  Decreased ROM/flexibility - manual therapy and therapeutic exercise  Decreased strength - therapeutic exercise and therapeutic activities  Decreased function - therapeutic activities    Previous and current functional limitations:  (See Goal Flow Sheet for this information)    Short term and Long term goals: (See Goal Flow Sheet for this information)     Communication ability:  Patient appears to be able to clearly communicate and understand verbal and written communication and follow directions correctly.  Treatment Explanation - The following has been discussed with the patient:   RX ordered/plan of care  Anticipated outcomes  Possible risks and side effects  This patient would benefit from PT intervention to resume normal activities.   Rehab potential is good.    Frequency:  1 X week, once daily  Duration:  for 6 weeks  Discharge Plan:  Achieve all LTG.  Independent in home treatment program.  Reach maximal therapeutic " benefit.    Please refer to the daily flowsheet for treatment today, total treatment time and time spent performing 1:1 timed codes.

## 2020-11-11 PROBLEM — M54.50 BILATERAL LOW BACK PAIN WITHOUT SCIATICA, UNSPECIFIED CHRONICITY: Status: ACTIVE | Noted: 2020-11-11

## 2020-11-13 ENCOUNTER — TELEPHONE (OUTPATIENT)
Dept: FAMILY MEDICINE | Facility: CLINIC | Age: 65
End: 2020-11-13

## 2020-11-13 NOTE — TELEPHONE ENCOUNTER
Date Forms was received: November 13, 2020    Forms received by: Fax    Purpose of Form:  PT/OT Orders LAQUITA    When the form is due:  ASAP    How the form needs to be returned for patient:  Fax    Form currently placed  SW inbox

## 2020-11-16 ENCOUNTER — THERAPY VISIT (OUTPATIENT)
Dept: PHYSICAL THERAPY | Facility: CLINIC | Age: 65
End: 2020-11-16
Payer: MEDICARE

## 2020-11-16 DIAGNOSIS — M54.50 BILATERAL LOW BACK PAIN WITHOUT SCIATICA, UNSPECIFIED CHRONICITY: ICD-10-CM

## 2020-11-16 PROCEDURE — 97110 THERAPEUTIC EXERCISES: CPT | Mod: GP | Performed by: PHYSICAL THERAPIST

## 2020-11-16 PROCEDURE — 97112 NEUROMUSCULAR REEDUCATION: CPT | Mod: GP | Performed by: PHYSICAL THERAPIST

## 2020-11-24 ENCOUNTER — THERAPY VISIT (OUTPATIENT)
Dept: PHYSICAL THERAPY | Facility: CLINIC | Age: 65
End: 2020-11-24
Payer: MEDICARE

## 2020-11-24 DIAGNOSIS — M54.50 BILATERAL LOW BACK PAIN WITHOUT SCIATICA, UNSPECIFIED CHRONICITY: ICD-10-CM

## 2020-11-24 PROCEDURE — 97110 THERAPEUTIC EXERCISES: CPT | Mod: GP | Performed by: PHYSICAL THERAPIST

## 2020-11-24 PROCEDURE — 97112 NEUROMUSCULAR REEDUCATION: CPT | Mod: GP | Performed by: PHYSICAL THERAPIST

## 2020-12-22 DIAGNOSIS — C61 PROSTATE CANCER (H): ICD-10-CM

## 2020-12-22 PROCEDURE — 84153 ASSAY OF PSA TOTAL: CPT | Performed by: UROLOGY

## 2020-12-22 PROCEDURE — 36415 COLL VENOUS BLD VENIPUNCTURE: CPT | Performed by: UROLOGY

## 2020-12-23 LAB — PSA SERPL-MCNC: 0.05 UG/L (ref 0–4)

## 2020-12-30 ENCOUNTER — OFFICE VISIT (OUTPATIENT)
Dept: UROLOGY | Facility: CLINIC | Age: 65
End: 2020-12-30
Payer: MEDICARE

## 2020-12-30 VITALS
HEIGHT: 72 IN | SYSTOLIC BLOOD PRESSURE: 120 MMHG | BODY MASS INDEX: 26.41 KG/M2 | HEART RATE: 72 BPM | WEIGHT: 195 LBS | DIASTOLIC BLOOD PRESSURE: 78 MMHG

## 2020-12-30 DIAGNOSIS — C61 PROSTATE CANCER (H): Primary | ICD-10-CM

## 2020-12-30 PROCEDURE — 99213 OFFICE O/P EST LOW 20 MIN: CPT | Mod: 25 | Performed by: UROLOGY

## 2020-12-30 PROCEDURE — 96402 CHEMO HORMON ANTINEOPL SQ/IM: CPT | Performed by: UROLOGY

## 2020-12-30 ASSESSMENT — PAIN SCALES - GENERAL: PAINLEVEL: MODERATE PAIN (5)

## 2020-12-30 ASSESSMENT — MIFFLIN-ST. JEOR: SCORE: 1697.51

## 2020-12-30 NOTE — PROGRESS NOTES
Office Visit Note  [facility]  [phone]    UROLOGIC DIAGNOSES:   High risk prostate cancer, erectile dysfunction    CURRENT INTERVENTIONS:   Radiotherapy plus planned 3 years hormonal therapy, Viagra    HISTORY:   Gisselle returns to clinic today for continued prostate cancer follow-up.  His most recent PSA was down a bit at 0.05.  He continues to tolerate the hormonal treatment well.  He has some hot flashes but says overall they are not bothersome to him.  He has no urinary symptoms or complaints.  Despite being on hormonal therapy he still uses Viagra with success      PAST MEDICAL HISTORY:   Past Medical History:   Diagnosis Date     Hyperlipidemia      Mumps        PAST SURGICAL HISTORY:   Past Surgical History:   Procedure Laterality Date     APPENDECTOMY       FACIAL RECONSTRUCTION SURGERY      had cheek bone fixed after trauma     HERNIA REPAIR       VASECTOMY       VASECTOMY       vasectomy reversal       VASOVASOSTOMY         FAMILY HISTORY:   Family History   Problem Relation Age of Onset     Cancer Brother         exposed to agent orange     Hypertension Mother      Hypertension Father      Diabetes Father      Skin Cancer Father      Alzheimer Disease Father      Diabetes Brother        SOCIAL HISTORY:   Social History     Socioeconomic History     Marital status:      Spouse name: Not on file     Number of children: Not on file     Years of education: Not on file     Highest education level: Not on file   Occupational History     Not on file   Social Needs     Financial resource strain: Not on file     Food insecurity     Worry: Not on file     Inability: Not on file     Transportation needs     Medical: Not on file     Non-medical: Not on file   Tobacco Use     Smoking status: Never Smoker     Smokeless tobacco: Never Used   Substance and Sexual Activity     Alcohol use: Yes     Frequency: 2-3 times a week     Drinks per session: 1 or 2     Drug use: Not Currently     Sexual  activity: Yes     Partners: Female   Lifestyle     Physical activity     Days per week: Not on file     Minutes per session: Not on file     Stress: Not on file   Relationships     Social connections     Talks on phone: Not on file     Gets together: Not on file     Attends Pentecostalism service: Not on file     Active member of club or organization: Not on file     Attends meetings of clubs or organizations: Not on file     Relationship status: Not on file     Intimate partner violence     Fear of current or ex partner: Not on file     Emotionally abused: Not on file     Physically abused: Not on file     Forced sexual activity: Not on file   Other Topics Concern     Parent/sibling w/ CABG, MI or angioplasty before 65F 55M? Not Asked   Social History Narrative     Not on file       Review Of Systems:  Skin: No rash, pruritis, or skin pigmentation  Eyes: No changes in vision  Ears/Nose/Throat: No changes in hearing, no nosebleeds  Respiratory: No shortness of breath, dyspnea on exertion, cough, or hemoptysis  Cardiovascular: No chest pain or palpitations  Gastrointestinal: No diarrhea or constipation. No abdominal pain. No hematochezia  Genitourinary: see HPI  Musculoskeletal: No pain or swelling of joints, normal range of motion  Neurologic: No weakness or tremors  Psychiatric: No recent changes in memory or mood  Hematologic/Lymphatic/Immunologic: No easy bruising or enlarged lymph nodes  Endocrine: No weight gain or loss      PHYSICAL EXAM:    Ht 1.829 m (6')   Wt 88.5 kg (195 lb)   BMI 26.45 kg/m      Constitutional: Well developed. Conversant and in no acute distress  Eyes: Anicteric sclera, conjunctiva clear, normal extraocular movements  ENT: Normocephalic and atraumatic,   Skin: Warm and dry. No rashes or lesions  Cardiac: No peripheral edema  Back/Flank: Not done  CNS/PNS: Normal musculature and movements, moves all extremities normally  Respiratory: Normal non-labored breathing  Abdomen: Soft nontender and  nondistended  Peripheral Vascular: No peripheral edema  Mental Status/Psych: Alert and Oriented x 3. Normal mood and affect    Penis: Not done  Scrotal Skin: Not done  Testicles: Not done  Epididymis: Not done  Digital Rectal Exam:     Cystoscopy: Not done    Imaging: None    Urinalysis: UA RESULTS:  Recent Labs   Lab Test 06/22/20  1342   COLOR Yellow   APPEARANCE Clear   URINEGLC Negative   URINEBILI Negative   URINEKETONE Negative   SG 1.010   UBLD Negative   URINEPH 5.5   PROTEIN Negative   UROBILINOGEN 0.2   NITRITE Negative   LEUKEST Negative       PSA: 0.05    Post Void Residual:     Other labs: None today      IMPRESSION:  Doing well    PLAN:  He continues to do well with hormonal therapy.  We will continue the plan to complete 3 years of hormonal therapy.  Today received his third out of 6 planned injections.  I will see him back again in 6 months.      Eamon Cano M.D.

## 2020-12-30 NOTE — NURSING NOTE
The following medication was given:     MEDICATION: Lupron Depot 45 mg  ROUTE: IM  SITE: [facility]  DOSE: 45mg  LOT #: [id number]  :  Yadiel  EXPIRATION DATE:  03/20/2023  NDC#: [id number]

## 2020-12-30 NOTE — NURSING NOTE
Has been having pain in the tail bone  And radiates up to hip. Has been doing PT . And is improving .

## 2020-12-30 NOTE — LETTER
12/30/2020       RE: Joseph Grissom  [address]  [place] [address]     Dear Colleague,    Thank you for referring your patient, Joseph Grissom, to the [facility] [place] [facility]. Please see a copy of my visit note below.    Office Visit Note  [facility]  [phone]    UROLOGIC DIAGNOSES:   High risk prostate cancer, erectile dysfunction    CURRENT INTERVENTIONS:   Radiotherapy plus planned 3 years hormonal therapy, Viagra    HISTORY:   Gisselle returns to clinic today for continued prostate cancer follow-up.  His most recent PSA was down a bit at 0.05.  He continues to tolerate the hormonal treatment well.  He has some hot flashes but says overall they are not bothersome to him.  He has no urinary symptoms or complaints.  Despite being on hormonal therapy he still uses Viagra with success      PAST MEDICAL HISTORY:   Past Medical History:   Diagnosis Date     Hyperlipidemia      Mumps        PAST SURGICAL HISTORY:   Past Surgical History:   Procedure Laterality Date     APPENDECTOMY       FACIAL RECONSTRUCTION SURGERY      had cheek bone fixed after trauma     HERNIA REPAIR       VASECTOMY       VASECTOMY       vasectomy reversal       VASOVASOSTOMY         FAMILY HISTORY:   Family History   Problem Relation Age of Onset     Cancer Brother         exposed to agent orange     Hypertension Mother      Hypertension Father      Diabetes Father      Skin Cancer Father      Alzheimer Disease Father      Diabetes Brother        SOCIAL HISTORY:   Social History     Socioeconomic History     Marital status:      Spouse name: Not on file     Number of children: Not on file     Years of education: Not on file     Highest education level: Not on file   Occupational History     Not on file   Social Needs     Financial resource strain: Not on file     Food insecurity     Worry: Not on file     Inability: Not on file      Transportation needs     Medical: Not on file     Non-medical: Not on file   Tobacco Use     Smoking status: Never Smoker     Smokeless tobacco: Never Used   Substance and Sexual Activity     Alcohol use: Yes     Frequency: 2-3 times a week     Drinks per session: 1 or 2     Drug use: Not Currently     Sexual activity: Yes     Partners: Female   Lifestyle     Physical activity     Days per week: Not on file     Minutes per session: Not on file     Stress: Not on file   Relationships     Social connections     Talks on phone: Not on file     Gets together: Not on file     Attends Restorationism service: Not on file     Active member of club or organization: Not on file     Attends meetings of clubs or organizations: Not on file     Relationship status: Not on file     Intimate partner violence     Fear of current or ex partner: Not on file     Emotionally abused: Not on file     Physically abused: Not on file     Forced sexual activity: Not on file   Other Topics Concern     Parent/sibling w/ CABG, MI or angioplasty before 65F 55M? Not Asked   Social History Narrative     Not on file       Review Of Systems:  Skin: No rash, pruritis, or skin pigmentation  Eyes: No changes in vision  Ears/Nose/Throat: No changes in hearing, no nosebleeds  Respiratory: No shortness of breath, dyspnea on exertion, cough, or hemoptysis  Cardiovascular: No chest pain or palpitations  Gastrointestinal: No diarrhea or constipation. No abdominal pain. No hematochezia  Genitourinary: see HPI  Musculoskeletal: No pain or swelling of joints, normal range of motion  Neurologic: No weakness or tremors  Psychiatric: No recent changes in memory or mood  Hematologic/Lymphatic/Immunologic: No easy bruising or enlarged lymph nodes  Endocrine: No weight gain or loss      PHYSICAL EXAM:    Ht 1.829 m (6')   Wt 88.5 kg (195 lb)   BMI 26.45 kg/m      Constitutional: Well developed. Conversant and in no acute distress  Eyes: Anicteric sclera, conjunctiva  clear, normal extraocular movements  ENT: Normocephalic and atraumatic,   Skin: Warm and dry. No rashes or lesions  Cardiac: No peripheral edema  Back/Flank: Not done  CNS/PNS: Normal musculature and movements, moves all extremities normally  Respiratory: Normal non-labored breathing  Abdomen: Soft nontender and nondistended  Peripheral Vascular: No peripheral edema  Mental Status/Psych: Alert and Oriented x 3. Normal mood and affect    Penis: Not done  Scrotal Skin: Not done  Testicles: Not done  Epididymis: Not done  Digital Rectal Exam:     Cystoscopy: Not done    Imaging: None    Urinalysis: UA RESULTS:  Recent Labs   Lab Test 06/22/20  1342   COLOR Yellow   APPEARANCE Clear   URINEGLC Negative   URINEBILI Negative   URINEKETONE Negative   SG 1.010   UBLD Negative   URINEPH 5.5   PROTEIN Negative   UROBILINOGEN 0.2   NITRITE Negative   LEUKEST Negative       PSA: 0.05    Post Void Residual:     Other labs: None today      IMPRESSION:  Doing well    PLAN:  He continues to do well with hormonal therapy.  We will continue the plan to complete 3 years of hormonal therapy.  Today received his third out of 6 planned injections.  I will see him back again in 6 months.      Eamon Cano M.D.

## 2021-01-29 ENCOUNTER — MYC MEDICAL ADVICE (OUTPATIENT)
Dept: FAMILY MEDICINE | Facility: CLINIC | Age: 66
End: 2021-01-29

## 2021-02-04 ENCOUNTER — THERAPY VISIT (OUTPATIENT)
Dept: PHYSICAL THERAPY | Facility: CLINIC | Age: 66
End: 2021-02-04
Payer: MEDICARE

## 2021-02-04 DIAGNOSIS — M54.50 BILATERAL LOW BACK PAIN WITHOUT SCIATICA, UNSPECIFIED CHRONICITY: ICD-10-CM

## 2021-02-04 PROCEDURE — 97110 THERAPEUTIC EXERCISES: CPT | Mod: GP | Performed by: PHYSICAL THERAPIST

## 2021-02-04 PROCEDURE — 97530 THERAPEUTIC ACTIVITIES: CPT | Mod: GP | Performed by: PHYSICAL THERAPIST

## 2021-02-04 NOTE — LETTER
DEPARTMENT OF HEALTH AND HUMAN SERVICES  [facility] FOR MED[facility] & MEDICAID SERVICES    PLAN/UPDATED PLAN OF PROGRESS FOR OUTPATIENT REHABILITATION          PATIENTS NAME:  Joseph Grissom   : 1955  PROVIDER NUMBER:    [id number]  [id number]  PROVIDER NAME: LAQUITA PANTOJA PT  MEDICAL RECORD NUMBER: [id number]   START OF CARE DATE: 11/10/20   TYPE:  PT  PRIMARY/TREATMENT DIAGNOSIS:   Bilateral low back pain without sciatica, unspecified chronicity    VISITS FROM START OF CARE:  Rxs Used: 4     Subjective:  The history is provided by the patient. No  was used.     Assessment/Plan:    PROGRESS  REPORT    Progress reporting period is from 11/10/20 to 21.       SUBJECTIVE  Subjective changes noted by patient:  .  Subjective: Went to [place], fine with walking, but running was a mistake. Noticed tingling in right thigh, most pain still in upper thigh. Have not tried running since September. Walking 2.5 mph on treadmill usually for 20 min x2 per day. Usually gets tingly in R LE after 10 minutes.     Current pain level is NA  .     Previous pain level was  NA  .   Changes in function:  None  Adverse reaction to treatment or activity: None    OBJECTIVE  Changes noted in objective findings:  Yes, see below.   Objective: Lumbar range of motion WFL, - femoral nerve test B, + hamstring and hip flexor tightness, tender to palpation in lower R QL and piriformis. Notable R hip drop with gait on treadmill.      ASSESSMENT/PLAN  Updated problem list and treatment plan: Diagnosis 1:  Low back pain  Pain -  manual therapy and STS  Decreased strength - therapeutic exercise, therapeutic activities and home program  Impaired gait - gait training and home program  Impaired muscle performance - neuro re-education and home program  Decreased function - therapeutic activities and home program  STG/LTGs have been met or progress has been made towards goals:  Yes (See Goal flow sheet completed today.)  "and None  Assessment of Progress: The patient's condition has potential to improve.  Self Management Plans:  Patient has been instructed in a home treatment program.  I have re-evaluated this patient and find that the nature, scope, duration and intensity of the therapy is appropriate for the medical condition of the patient.  Joseph continues to require the following intervention to meet STG and LTG's:  PT    PATIENTS NAME:  Joseph Grissom   : 1955    Recommendations:  This patient would benefit from continued therapy.     Frequency:  2 X a month, once daily  Duration:  for 2 months      Caregiver Signature/Credentials _____________________________ Date ________       Treating Provider: Jyoti Coleman, PT, OCS   I have reviewed and certified the need for these services and plan of treatment while under my care.        PHYSICIAN'S SIGNATURE:   _____________________________________  Date___________                         Twin Holcomb MD     Certification period:  Beginning of Cert date period: 21 to  End of Cert period date: 21     Functional Level Progress Report: Please see attached \"Goal Flow sheet for Functional level.\"    ____X____ Continue Services or       ________ DC Services                Service dates: From  SOC Date: 11/10/20 date to present                         "

## 2021-02-04 NOTE — LETTER
DEPARTMENT OF HEALTH AND HUMAN SERVICES  [facility] FOR [facility]IC[facility] & MEDICAID SERVICES    PLAN/UPDATED PLAN OF PROGRESS FOR OUTPATIENT REHABILITATION          PATIENTS NAME:  Joseph Grissom   : 1955  PROVIDER NUMBER:    [id number]  [id number]   PROVIDER NAME: LAQUITA PANTOJA PT  MEDICAL RECORD NUMBER: [id number]   START OF CARE DATE: 11/10/20   TYPE:  PT  PRIMARY/TREATMENT DIAGNOSIS:   Bilateral low back pain without sciatica, unspecified chronicity    VISITS FROM START OF CARE:  Rxs Used: 4     Subjective:  The history is provided by the patient. No  was used.     Assessment/Plan:    PROGRESS  REPORT    Progress reporting period is from 11/10/20 to 21.       SUBJECTIVE  Subjective changes noted by patient:  .  Subjective: Went to [place], fine with walking, but running was a mistake. Noticed tingling in right thigh, most pain still in upper thigh. Have not tried running since September. Walking 2.5 mph on treadmill usually for 20 min x2 per day. Usually gets tingly in R LE after 10 minutes.     Current pain level is NA  .     Previous pain level was  NA  .   Changes in function:  None  Adverse reaction to treatment or activity: None    OBJECTIVE  Changes noted in objective findings:  Yes, see below.   Objective: Lumbar range of motion WFL, - femoral nerve test B, + hamstring and hip flexor tightness, tender to palpation in lower R QL and piriformis. Notable R hip drop with gait on treadmill.      ASSESSMENT/PLAN  Updated problem list and treatment plan: Diagnosis 1:  Low back pain  Pain -  manual therapy and STS  Decreased strength - therapeutic exercise, therapeutic activities and home program  Impaired gait - gait training and home program  Impaired muscle performance - neuro re-education and home program  Decreased function - therapeutic activities and home program  STG/LTGs have been met or progress has been made towards goals:  Yes (See Goal flow sheet completed today.)  "and None  Assessment of Progress: The patient's condition has potential to improve.  Self Management Plans:  Patient has been instructed in a home treatment program.  PATIENTS NAME:  Joseph Grissom   : 1955    I have re-evaluated this patient and find that the nature, scope, duration and intensity of the therapy is appropriate for the medical condition of the patient.  Joseph continues to require the following intervention to meet STG and LTG's:  PT    Recommendations:  This patient would benefit from continued therapy.     Frequency:  2 X a month, once daily  Duration:  for 2 months      Caregiver Signature/Credentials _____________________________ Date ________      Treating Provider: Jyoti Coleman, PT, OCS   I have reviewed and certified the need for these services and plan of treatment while under my care.        PHYSICIAN'S SIGNATURE:   ____________________________________  Date___________                                Twin Holcomb MD    Certification period:  Beginning of Cert date period: 21 to  End of Cert period date: 21     Functional Level Progress Report: Please see attached \"Goal Flow sheet for Functional level.\"    ____X____ Continue Services or       ________ DC Services                Service dates: From  SOC Date: 11/10/20 date to present                         "

## 2021-02-04 NOTE — PROGRESS NOTES
Subjective:  The history is provided by the patient. No  was used.     Physical Exam                    Objective:  System    Physical Exam    General     ROS    Assessment/Plan:    PROGRESS  REPORT    Progress reporting period is from 11/10/20 to 2/7/21.       SUBJECTIVE  Subjective changes noted by patient:  .  Subjective: Went to [place], fine with walking, but running was a mistake. Noticed tingling in right thigh, most pain still in upper thigh. Have not tried running since September. Walking 2.5 mph on treadmill usually for 20 min x2 per day. Usually gets tingly in R LE after 10 minutes.     Current pain level is NA  .     Previous pain level was  NA  .   Changes in function:  None  Adverse reaction to treatment or activity: None    OBJECTIVE  Changes noted in objective findings:  Yes, see below.   Objective: Lumbar range of motion WFL, - femoral nerve test B, + hamstring and hip flexor tightness, tender to palpation in lower R QL and piriformis. Notable R hip drop with gait on treadmill.      ASSESSMENT/PLAN  Updated problem list and treatment plan: Diagnosis 1:  Low back pain  Pain -  manual therapy and STS  Decreased strength - therapeutic exercise, therapeutic activities and home program  Impaired gait - gait training and home program  Impaired muscle performance - neuro re-education and home program  Decreased function - therapeutic activities and home program  STG/LTGs have been met or progress has been made towards goals:  Yes (See Goal flow sheet completed today.) and None  Assessment of Progress: The patient's condition has potential to improve.  Self Management Plans:  Patient has been instructed in a home treatment program.  I have re-evaluated this patient and find that the nature, scope, duration and intensity of the therapy is appropriate for the medical condition of the patient.  Joseph continues to require the following intervention to meet STG and LTG's:   PT    Recommendations:  This patient would benefit from continued therapy.     Frequency:  2 X a month, once daily  Duration:  for 2 months        Please refer to the daily flowsheet for treatment today, total treatment time and time spent performing 1:1 timed codes.

## 2021-02-08 ENCOUNTER — TELEPHONE (OUTPATIENT)
Dept: FAMILY MEDICINE | Facility: CLINIC | Age: 66
End: 2021-02-08

## 2021-02-08 NOTE — TELEPHONE ENCOUNTER
Reason for Call:  Form, our goal is to have forms completed with 72 hours, however, some forms may require a visit or additional information.    Type of letter, form or note:  medical    Who is the form from?: Home care    Where did the form come from: form was faxed in    What clinic location was the form placed at?: [place]    Where the form was placed: Dr Holcomb Box/Folder    What number is listed as a contact on the form?: [phone]             Call taken on 2/8/2021 at 3:04 PM by Monica Domingo

## 2021-02-11 NOTE — TELEPHONE ENCOUNTER
Completed forms faxed back to  [facility] [phone]      Originals sent to be scanned.       Hilaria Reyna

## 2021-02-18 ENCOUNTER — THERAPY VISIT (OUTPATIENT)
Dept: PHYSICAL THERAPY | Facility: CLINIC | Age: 66
End: 2021-02-18
Payer: MEDICARE

## 2021-02-18 DIAGNOSIS — M54.50 BILATERAL LOW BACK PAIN WITHOUT SCIATICA, UNSPECIFIED CHRONICITY: ICD-10-CM

## 2021-02-18 PROCEDURE — 97110 THERAPEUTIC EXERCISES: CPT | Mod: GP | Performed by: PHYSICAL THERAPIST

## 2021-02-18 PROCEDURE — 97535 SELF CARE MNGMENT TRAINING: CPT | Mod: GP | Performed by: PHYSICAL THERAPIST

## 2021-02-25 ENCOUNTER — THERAPY VISIT (OUTPATIENT)
Dept: PHYSICAL THERAPY | Facility: CLINIC | Age: 66
End: 2021-02-25
Payer: MEDICARE

## 2021-02-25 DIAGNOSIS — M54.50 BILATERAL LOW BACK PAIN WITHOUT SCIATICA, UNSPECIFIED CHRONICITY: ICD-10-CM

## 2021-02-25 PROCEDURE — 97530 THERAPEUTIC ACTIVITIES: CPT | Mod: GP | Performed by: PHYSICAL THERAPIST

## 2021-02-25 PROCEDURE — 97535 SELF CARE MNGMENT TRAINING: CPT | Mod: GP | Performed by: PHYSICAL THERAPIST

## 2021-03-01 ENCOUNTER — THERAPY VISIT (OUTPATIENT)
Dept: PHYSICAL THERAPY | Facility: CLINIC | Age: 66
End: 2021-03-01
Payer: MEDICARE

## 2021-03-01 DIAGNOSIS — M54.50 BILATERAL LOW BACK PAIN WITHOUT SCIATICA, UNSPECIFIED CHRONICITY: ICD-10-CM

## 2021-03-01 PROCEDURE — 97535 SELF CARE MNGMENT TRAINING: CPT | Mod: GP | Performed by: PHYSICAL THERAPIST

## 2021-03-01 PROCEDURE — 97140 MANUAL THERAPY 1/> REGIONS: CPT | Mod: GP | Performed by: PHYSICAL THERAPIST

## 2021-03-08 ENCOUNTER — THERAPY VISIT (OUTPATIENT)
Dept: PHYSICAL THERAPY | Facility: CLINIC | Age: 66
End: 2021-03-08
Payer: MEDICARE

## 2021-03-08 DIAGNOSIS — M54.50 BILATERAL LOW BACK PAIN WITHOUT SCIATICA, UNSPECIFIED CHRONICITY: ICD-10-CM

## 2021-03-08 PROCEDURE — 97110 THERAPEUTIC EXERCISES: CPT | Mod: GP | Performed by: PHYSICAL THERAPIST

## 2021-03-08 PROCEDURE — 97535 SELF CARE MNGMENT TRAINING: CPT | Mod: GP | Performed by: PHYSICAL THERAPIST

## 2021-03-18 ENCOUNTER — THERAPY VISIT (OUTPATIENT)
Dept: PHYSICAL THERAPY | Facility: CLINIC | Age: 66
End: 2021-03-18
Payer: MEDICARE

## 2021-03-18 DIAGNOSIS — M54.50 BILATERAL LOW BACK PAIN WITHOUT SCIATICA, UNSPECIFIED CHRONICITY: ICD-10-CM

## 2021-03-18 PROCEDURE — 97530 THERAPEUTIC ACTIVITIES: CPT | Mod: GP | Performed by: PHYSICAL THERAPIST

## 2021-03-18 PROCEDURE — 97535 SELF CARE MNGMENT TRAINING: CPT | Mod: GP | Performed by: PHYSICAL THERAPIST

## 2021-04-01 ENCOUNTER — THERAPY VISIT (OUTPATIENT)
Dept: PHYSICAL THERAPY | Facility: CLINIC | Age: 66
End: 2021-04-01
Payer: MEDICARE

## 2021-04-01 DIAGNOSIS — M54.50 BILATERAL LOW BACK PAIN WITHOUT SCIATICA, UNSPECIFIED CHRONICITY: ICD-10-CM

## 2021-04-01 PROCEDURE — 97110 THERAPEUTIC EXERCISES: CPT | Mod: GP | Performed by: PHYSICAL THERAPIST

## 2021-04-01 PROCEDURE — 97112 NEUROMUSCULAR REEDUCATION: CPT | Mod: GP | Performed by: PHYSICAL THERAPIST

## 2021-05-19 ENCOUNTER — THERAPY VISIT (OUTPATIENT)
Dept: PHYSICAL THERAPY | Facility: CLINIC | Age: 66
End: 2021-05-19
Payer: MEDICARE

## 2021-05-19 DIAGNOSIS — M54.50 BILATERAL LOW BACK PAIN WITHOUT SCIATICA, UNSPECIFIED CHRONICITY: ICD-10-CM

## 2021-05-19 PROCEDURE — 97110 THERAPEUTIC EXERCISES: CPT | Mod: GP | Performed by: PHYSICAL THERAPIST

## 2021-05-19 PROCEDURE — 97112 NEUROMUSCULAR REEDUCATION: CPT | Mod: GP | Performed by: PHYSICAL THERAPIST

## 2021-05-19 NOTE — LETTER
DEPARTMENT OF HEALTH AND HUMAN SERVICES  CENTERS FOR MEDICARE & MEDICAID SERVICES    PLAN/UPDATED PLAN OF PROGRESS FOR OUTPATIENT REHABILITATION          PATIENTS NAME:  Joseph Grissom   : 1955  PROVIDER NUMBER:    [id number]  HICN:  [id number]   PROVIDER NAME:  [facility] [place]  MEDICAL RECORD NUMBER: [id number]   START OF CARE DATE:11/10/20   TYPE:  PT  PRIMARY/TREATMENT DIAGNOSIS:   Bilateral low back pain without sciatica, unspecified chronicity    VISITS FROM START OF CARE:  Rxs Used: 11     Subjective:  HPI  Physical Exam  Oswestry Score: 8.89 %                 Assessment/Plan:    DISCHARGE REPORT    Progress reporting period is from 21.       SUBJECTIVE  Subjective changes noted by patient:   Returned from [facility] and trip went well. Up to 45 minutes run/jog. Tingling starts at ~ 25-30 minutes into R thigh. Goes away with rest. Only very mild occasional LBP. Was able to do HEP on trip. Distance run ~ 4miles. Able to stand longer and bend to tie shoes easier.     Current pain level is 1/10 Current Pain level: 1/10.     Previous pain level was   Initial Pain level: 2/10.   Changes in function:  Yes (See Goal flowsheet attached for changes in current functional level)  Adverse reaction to treatment or activity: None    OBJECTIVE  Changes noted in objective findings:  Yes,   Objective: Continue with plan for 10% increase/week with running. Lumbar AROM: flexion min loss, extension mod loss, B SB WNL, B hip PROM WFL. Good control with SL bridge. SLS ~8-10 seconds R and L.      ASSESSMENT/PLAN  Updated problem list and treatment plan:   STG/LTGs have been met or progress has been made towards goals:  Yes (See Goal flow sheet completed today.)  Assessment of Progress: The patient has met all of their long term goals.  Self Management Plans:  Patient is independent in a home treatment program.  Patient is independent in self management of symptoms.    Joseph  "continues to require the following intervention to meet STG and LTG's:  PT intervention is no longer required to meet STG/LTG.        PATIENTS NAME:  Joseph Grissom   : 1955    Recommendations:  This patient is ready to be discharged from therapy and continue their home treatment program.          Caregiver Signature/Credentials _____________________________ Date ________       Treating Provider: Jyoti Coleman, PT, OCS   I have reviewed and certified the need for these services and plan of treatment while under my care.        PHYSICIAN'S SIGNATURE:   _____________________________________  Date___________                                              Twin Holcomb DO    Certification period:  Beginning of Cert date period: 21 to  End of Cert period date: 21     Functional Level Progress Report: Please see attached \"Goal Flow sheet for Functional level.\"    ________ Continue Services or       ____X____ DC Services                Service dates: From  SOC Date: 11/10/20 date to present                         "

## 2021-05-20 NOTE — PROGRESS NOTES
Subjective:  HPI  Physical Exam  Oswestry Score: 8.89 %                 Objective:  System    Physical Exam    General     ROS    Assessment/Plan:    DISCHARGE REPORT    Progress reporting period is from 05/19/21.       SUBJECTIVE  Subjective changes noted by patient:   Returned from [facility] and trip went well. Up to 45 minutes run/jog. Tingling starts at ~ 25-30 minutes into R thigh. Goes away with rest. Only very mild occasional LBP. Was able to do HEP on trip. Distance run ~ 4miles. Able to stand longer and bend to tie shoes easier.     Current pain level is 1/10 Current Pain level: 1/10.     Previous pain level was   Initial Pain level: 2/10.   Changes in function:  Yes (See Goal flowsheet attached for changes in current functional level)  Adverse reaction to treatment or activity: None    OBJECTIVE  Changes noted in objective findings:  Yes,   Objective: Continue with plan for 10% increase/week with running. Lumbar AROM: flexion min loss, extension mod loss, B SB WNL, B hip PROM WFL. Good control with SL bridge. SLS ~8-10 seconds R and L.      ASSESSMENT/PLAN  Updated problem list and treatment plan:   STG/LTGs have been met or progress has been made towards goals:  Yes (See Goal flow sheet completed today.)  Assessment of Progress: The patient has met all of their long term goals.  Self Management Plans:  Patient is independent in a home treatment program.  Patient is independent in self management of symptoms.    Joseph continues to require the following intervention to meet STG and LTG's:  PT intervention is no longer required to meet STG/LTG.    Recommendations:  This patient is ready to be discharged from therapy and continue their home treatment program.    Please refer to the daily flowsheet for treatment today, total treatment time and time spent performing 1:1 timed codes.

## 2021-05-24 ENCOUNTER — TELEPHONE (OUTPATIENT)
Dept: FAMILY MEDICINE | Facility: CLINIC | Age: 66
End: 2021-05-24

## 2021-05-24 NOTE — TELEPHONE ENCOUNTER
Reason for Call:  Form, our goal is to have forms completed with 72 hours, however, some forms may require a visit or additional information.    Type of letter, form or note:  medical    Who is the form from?: Home care    Where did the form come from: form was faxed in    What clinic location was the form placed at?: [place]    Where the form was placed: Dr Holcomb Box/Folder    What number is listed as a contact on the form?: [phone]       Additional comments:     Call taken on 5/24/2021 at 10:56 AM by Jodi Romero

## 2021-05-24 NOTE — TELEPHONE ENCOUNTER
Completed forms faxed back to  [facility]  at [phone]  .   Originals sent to be scanned.       Hilaria Reyna

## 2021-06-23 DIAGNOSIS — C61 PROSTATE CANCER (H): ICD-10-CM

## 2021-06-23 PROCEDURE — 36415 COLL VENOUS BLD VENIPUNCTURE: CPT | Performed by: UROLOGY

## 2021-06-23 PROCEDURE — 84153 ASSAY OF PSA TOTAL: CPT | Performed by: UROLOGY

## 2021-06-24 LAB — PSA SERPL-MCNC: 0.01 UG/L (ref 0–4)

## 2021-06-30 ENCOUNTER — OFFICE VISIT (OUTPATIENT)
Dept: UROLOGY | Facility: CLINIC | Age: 66
End: 2021-06-30
Payer: MEDICARE

## 2021-06-30 VITALS
BODY MASS INDEX: 27.09 KG/M2 | HEIGHT: 72 IN | WEIGHT: 200 LBS | SYSTOLIC BLOOD PRESSURE: 112 MMHG | DIASTOLIC BLOOD PRESSURE: 74 MMHG

## 2021-06-30 DIAGNOSIS — R20.0 NUMBNESS AND TINGLING OF RIGHT LEG: ICD-10-CM

## 2021-06-30 DIAGNOSIS — C61 PROSTATE CANCER (H): Primary | ICD-10-CM

## 2021-06-30 DIAGNOSIS — N50.9 SCROTAL LESION: ICD-10-CM

## 2021-06-30 DIAGNOSIS — R20.2 NUMBNESS AND TINGLING OF RIGHT LEG: ICD-10-CM

## 2021-06-30 PROCEDURE — 96402 CHEMO HORMON ANTINEOPL SQ/IM: CPT | Performed by: PHYSICIAN ASSISTANT

## 2021-06-30 PROCEDURE — 99214 OFFICE O/P EST MOD 30 MIN: CPT | Mod: 25 | Performed by: PHYSICIAN ASSISTANT

## 2021-06-30 ASSESSMENT — ENCOUNTER SYMPTOMS
NAUSEA: 0
ENDOCRINE COMMENTS: HOT FLASHES.
CHILLS: 0
DYSURIA: 1
VOMITING: 0
HEMATURIA: 0
WEAKNESS: 0
FEVER: 0
SHORTNESS OF BREATH: 0

## 2021-06-30 ASSESSMENT — MIFFLIN-ST. JEOR: SCORE: 1720.19

## 2021-06-30 ASSESSMENT — PAIN SCALES - GENERAL: PAINLEVEL: NO PAIN (0)

## 2021-06-30 NOTE — NURSING NOTE
"Chief Complaint   Patient presents with     Prostate Cancer     Here for PSA and lupron injection and has \"tingly' feeling in leg   The following medication was given:     MEDICATION: Lupron Depot 45 mg  ROUTE: IM  SITE: LUQ - Gluteus  DOSE: 45 mg  LOT #: [id number]  : Yadiel  EXPIRATION DATE:  9-2-2023  NDC#: 5309-5275-44   Yaneth Angulo LPN  "

## 2021-06-30 NOTE — PATIENT INSTRUCTIONS
Follow-up in 6 months with repeat PSA, 6-month Lupron, and office visit.    Contact us if back pain returns for consideration of a bone scan due to prostate cancer propensity to metastasize to bone.    We will continue to monitor scrotal lesion.

## 2021-06-30 NOTE — LETTER
6/30/2021       RE: Joseph Grissom  [address]  [place] [place] [address]     Dear Colleague,    Thank you for referring your patient, Joseph Grissom, to the [facility] [place] [facility]. Please see a copy of my visit note below.    Subjective      CHIEF COMPLAINT/REASON FOR VISIT   Dr. Cano's patient  Prostate cancer follow up    HISTORY OF PRESENT ILLNESS   Mr. Grissom is a is a very pleasant 65-year-old gentleman, who presents today for prostate cancer recheck.  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  Also evidence of Wei 7.  PSA was 9.02 at the time of diagnosis.    He underwent 39 fractions of radiation.  He was recommended to continue with androgen deprivation therapy for 3 years.  He has gone through 1.5 years of ADT.    He is tolerating hormone therapy.  He does not appear amount of hot flashes.  He also endorses that he tends to have nocturia often attributed to hot flashes.  He will occasionally take a nap.  Patient notes some occasional dysuria.  This is improved with increasing fluid intake.    He has undergone physical therapy for lower back and right thigh tingling.  This happened after radiation.  He does note that this has improved.    He feels that he is emptying his bladder well and his urinary stream is adequate.  Denies constitutional complaints at this time.    He also notes a new lesion on his scrotum that he would like looked at.    PSA at last visit was 0.05.  PSA most recently 0.01.    He has also noted increase in  subconjunctival hemorrhages.    The following portions of the patient's history were reviewed and updated as appropriate: allergies, current medications, past family history, past medical history, past social history, past surgical history, and problem list.     REVIEW OF SYSTEMS   Review of Systems   Constitutional: Negative for chills and fever.    Respiratory: Negative for shortness of breath.    Cardiovascular: Negative for chest pain.   Gastrointestinal: Negative for nausea and vomiting.   Endocrine:        Hot flashes.   Genitourinary: Positive for dysuria. Negative for hematuria.   Neurological: Negative for weakness.      Per HPI.     Patient Active Problem List   Diagnosis     Prostate cancer (H)     Bilateral low back pain without sciatica, unspecified chronicity      Past Medical History:   Diagnosis Date     Hyperlipidemia      Mumps       Objective      PHYSICAL EXAM   /74   Ht 1.829 m (6')   Wt 90.7 kg (200 lb)   BMI 27.12 kg/m     Physical Exam  Constitutional:       Appearance: Normal appearance.   Pulmonary:      Effort: Pulmonary effort is normal.   Abdominal:      General: There is no distension.   Genitourinary:     Comments: Scrotum behind the penis shows a small alida-like lesion.  Freely mobile and in the skin.  Not attached to the testicles or epididymis.  Appears most consistent with possible lipoma  Skin:     General: Skin is warm and dry.   Neurological:      General: No focal deficit present.      Mental Status: He is alert.   Psychiatric:         Mood and Affect: Mood normal.         Behavior: Behavior normal.       LABORATORY     Lab Results   Component Value Date    PSA 0.01 06/23/2021    PSA 0.05 12/22/2020      Assessment & Plan    1. Prostate cancer (H)    2. Scrotal lesion    3. Numbness and tingling of right leg      I had the pleasure today of meeting with Mr. Grissom to discuss his prostate cancer.  PSA is low at 0.01.  We will continue for a complete treatment of hormone therapy for 3 years.  Today is 4 of 6: 6-month Lupron injections.  We will obtain this after visit today.    Patient does note improvement in his back discomfort after physical therapy.  The numbness and tingling in his right thigh is likely neural or musculoskeletal related.  Unlikely to be related to hormone therapy.  However, if his pain  begins to increase, will consider bone scan to look for metastatic disease in the spine.    -We will up in 6 months with PSA, 6-month Lupron injection, and office visit.  -Contact us in interim if tingling and back discomfort worsen.  -Scrotal lesion looks most consistent with likely lipoma.  Continue to monitor.  Hold off on imaging at this time.  -Encouraged continued hydration.  If discomfort continues, possible cystoscopy and UA/culture for infection.  Discussed the role of bladder irritants and radiation in irritative symptoms.    Signed by:       Adele Garza PA-C 6/30/2021 1:30 PM     I spent a total of 36 minutes obtaining the HPI, examining the patient, documentation, counseling the patient on diagnosis and treatment on the day of the visit.

## 2021-06-30 NOTE — PROGRESS NOTES
Subjective      CHIEF COMPLAINT/REASON FOR VISIT   Dr. Cano's patient  Prostate cancer follow up    HISTORY OF PRESENT ILLNESS   Mr. Grissom is a is a very pleasant 65-year-old gentleman, who presents today for prostate cancer recheck.  He was diagnosed with [place] 4+4 = 8 adenocarcinoma the prostate.  Also evidence of Wei 7.  PSA was 9.02 at the time of diagnosis.    He underwent 39 fractions of radiation.  He was recommended to continue with androgen deprivation therapy for 3 years.  He has gone through 1.5 years of ADT.    He is tolerating hormone therapy.  He does not appear amount of hot flashes.  He also endorses that he tends to have nocturia often attributed to hot flashes.  He will occasionally take a nap.  Patient notes some occasional dysuria.  This is improved with increasing fluid intake.    He has undergone physical therapy for lower back and right thigh tingling.  This happened after radiation.  He does note that this has improved.    He feels that he is emptying his bladder well and his urinary stream is adequate.  Denies constitutional complaints at this time.    He also notes a new lesion on his scrotum that he would like looked at.    PSA at last visit was 0.05.  PSA most recently 0.01.    He has also noted increase in  subconjunctival hemorrhages.    The following portions of the patient's history were reviewed and updated as appropriate: allergies, current medications, past family history, past medical history, past social history, past surgical history, and problem list.     REVIEW OF SYSTEMS   Review of Systems   Constitutional: Negative for chills and fever.   Respiratory: Negative for shortness of breath.    Cardiovascular: Negative for chest pain.   Gastrointestinal: Negative for nausea and vomiting.   Endocrine:        Hot flashes.   Genitourinary: Positive for dysuria. Negative for hematuria.   Neurological: Negative for weakness.      Per HPI.     Patient Active Problem List    Diagnosis     Prostate cancer (H)     Bilateral low back pain without sciatica, unspecified chronicity      Past Medical History:   Diagnosis Date     Hyperlipidemia      Mumps       Objective      PHYSICAL EXAM   /74   Ht 1.829 m (6')   Wt 90.7 kg (200 lb)   BMI 27.12 kg/m     Physical Exam  Constitutional:       Appearance: Normal appearance.   Pulmonary:      Effort: Pulmonary effort is normal.   Abdominal:      General: There is no distension.   Genitourinary:     Comments: Scrotum behind the penis shows a small alida-like lesion.  Freely mobile and in the skin.  Not attached to the testicles or epididymis.  Appears most consistent with possible lipoma  Skin:     General: Skin is warm and dry.   Neurological:      General: No focal deficit present.      Mental Status: He is alert.   Psychiatric:         Mood and Affect: Mood normal.         Behavior: Behavior normal.       LABORATORY     Lab Results   Component Value Date    PSA 0.01 06/23/2021    PSA 0.05 12/22/2020      Assessment & Plan    1. Prostate cancer (H)    2. Scrotal lesion    3. Numbness and tingling of right leg      I had the pleasure today of meeting with Mr. Grissom to discuss his prostate cancer.  PSA is low at 0.01.  We will continue for a complete treatment of hormone therapy for 3 years.  Today is 4 of 6: 6-month Lupron injections.  We will obtain this after visit today.    Patient does note improvement in his back discomfort after physical therapy.  The numbness and tingling in his right thigh is likely neural or musculoskeletal related.  Unlikely to be related to hormone therapy.  However, if his pain begins to increase, will consider bone scan to look for metastatic disease in the spine.    -We will up in 6 months with PSA, 6-month Lupron injection, and office visit.  -Contact us in interim if tingling and back discomfort worsen.  -Scrotal lesion looks most consistent with likely lipoma.  Continue to monitor.  Hold off on  imaging at this time.  -Encouraged continued hydration.  If discomfort continues, possible cystoscopy and UA/culture for infection.  Discussed the role of bladder irritants and radiation in irritative symptoms.    Signed by:       Adele Garza PA-C 6/30/2021 1:30 PM     I spent a total of 36 minutes obtaining the HPI, examining the patient, documentation, counseling the patient on diagnosis and treatment on the day of the visit.

## 2021-07-13 PROBLEM — M54.50 BILATERAL LOW BACK PAIN WITHOUT SCIATICA, UNSPECIFIED CHRONICITY: Status: RESOLVED | Noted: 2020-11-11 | Resolved: 2021-07-13

## 2021-10-08 ASSESSMENT — ENCOUNTER SYMPTOMS
NERVOUS/ANXIOUS: 0
NAUSEA: 0
HEARTBURN: 0
CONSTIPATION: 0
FEVER: 0
SORE THROAT: 0
EYE PAIN: 0
HEMATURIA: 0
JOINT SWELLING: 0
DYSURIA: 1
PALPITATIONS: 0
WEAKNESS: 0
HEMATOCHEZIA: 0
DIZZINESS: 0
FREQUENCY: 1
ARTHRALGIAS: 0
COUGH: 0
DIARRHEA: 0
SHORTNESS OF BREATH: 0
ABDOMINAL PAIN: 0
PARESTHESIAS: 0
CHILLS: 0
HEADACHES: 0
MYALGIAS: 0

## 2021-10-08 ASSESSMENT — ACTIVITIES OF DAILY LIVING (ADL): CURRENT_FUNCTION: NO ASSISTANCE NEEDED

## 2021-10-10 ENCOUNTER — HEALTH MAINTENANCE LETTER (OUTPATIENT)
Age: 66
End: 2021-10-10

## 2021-10-11 ENCOUNTER — OFFICE VISIT (OUTPATIENT)
Dept: FAMILY MEDICINE | Facility: CLINIC | Age: 66
End: 2021-10-11
Payer: MEDICARE

## 2021-10-11 VITALS
TEMPERATURE: 97.6 F | RESPIRATION RATE: 14 BRPM | HEIGHT: 72 IN | SYSTOLIC BLOOD PRESSURE: 138 MMHG | WEIGHT: 200 LBS | BODY MASS INDEX: 27.09 KG/M2 | DIASTOLIC BLOOD PRESSURE: 72 MMHG | OXYGEN SATURATION: 98 % | HEART RATE: 65 BPM

## 2021-10-11 DIAGNOSIS — E78.5 HYPERLIPIDEMIA, UNSPECIFIED HYPERLIPIDEMIA TYPE: ICD-10-CM

## 2021-10-11 DIAGNOSIS — R30.0 DYSURIA: ICD-10-CM

## 2021-10-11 DIAGNOSIS — Z13.1 SCREENING FOR DIABETES MELLITUS: ICD-10-CM

## 2021-10-11 DIAGNOSIS — Z23 HIGH PRIORITY FOR 2019-NCOV VACCINE: ICD-10-CM

## 2021-10-11 DIAGNOSIS — Z00.00 ENCOUNTER FOR MEDICARE ANNUAL WELLNESS EXAM: Primary | ICD-10-CM

## 2021-10-11 DIAGNOSIS — Z12.11 SCREEN FOR COLON CANCER: ICD-10-CM

## 2021-10-11 LAB
ALBUMIN UR-MCNC: NEGATIVE MG/DL
APPEARANCE UR: CLEAR
BILIRUB UR QL STRIP: NEGATIVE
COLOR UR AUTO: YELLOW
GLUCOSE UR STRIP-MCNC: NEGATIVE MG/DL
HGB UR QL STRIP: ABNORMAL
KETONES UR STRIP-MCNC: NEGATIVE MG/DL
LEUKOCYTE ESTERASE UR QL STRIP: NEGATIVE
NITRATE UR QL: NEGATIVE
PH UR STRIP: 7 [PH] (ref 5–7)
RBC #/AREA URNS AUTO: NORMAL /HPF
SP GR UR STRIP: 1.02 (ref 1–1.03)
UROBILINOGEN UR STRIP-ACNC: 0.2 E.U./DL
WBC #/AREA URNS AUTO: NORMAL /HPF

## 2021-10-11 PROCEDURE — 0003A COVID-19,PF,PFIZER (12+ YRS): CPT | Performed by: FAMILY MEDICINE

## 2021-10-11 PROCEDURE — 91300 COVID-19,PF,PFIZER (12+ YRS): CPT | Performed by: FAMILY MEDICINE

## 2021-10-11 PROCEDURE — 81001 URINALYSIS AUTO W/SCOPE: CPT | Performed by: FAMILY MEDICINE

## 2021-10-11 PROCEDURE — G0439 PPPS, SUBSEQ VISIT: HCPCS | Performed by: FAMILY MEDICINE

## 2021-10-11 ASSESSMENT — ENCOUNTER SYMPTOMS
HEMATURIA: 0
ARTHRALGIAS: 0
PARESTHESIAS: 0
SHORTNESS OF BREATH: 0
MYALGIAS: 0
COUGH: 0
DYSURIA: 1
HEMATOCHEZIA: 0
HEARTBURN: 0
ABDOMINAL PAIN: 0
HEADACHES: 0
DIARRHEA: 0
NAUSEA: 0
FREQUENCY: 1
DIZZINESS: 0
CHILLS: 0
JOINT SWELLING: 0
CONSTIPATION: 0
FEVER: 0
SORE THROAT: 0
EYE PAIN: 0
PALPITATIONS: 0
NERVOUS/ANXIOUS: 0
WEAKNESS: 0

## 2021-10-11 ASSESSMENT — MIFFLIN-ST. JEOR: SCORE: 1715.19

## 2021-10-11 ASSESSMENT — ACTIVITIES OF DAILY LIVING (ADL): CURRENT_FUNCTION: NO ASSISTANCE NEEDED

## 2021-10-11 NOTE — PROGRESS NOTES
"SUBJECTIVE:   Joseph Grissom is a 66 year old male who presents for Preventive Visit.    Patient has been advised of split billing requirements and indicates understanding: Yes     Are you in the first 12 months of your Medicare coverage?  No    Healthy Habits:     In general, how would you rate your overall health?  Excellent    Frequency of exercise:  4-5 days/week    Duration of exercise:  30-45 minutes    Do you usually eat at least 4 servings of fruit and vegetables a day, include whole grains    & fiber and avoid regularly eating high fat or \"junk\" foods?  Yes    Taking medications regularly:  Yes    Medication side effects:  None    Ability to successfully perform activities of daily living:  No assistance needed    Home Safety:  No safety concerns identified    Hearing Impairment:  Difficulty following a conversation in a noisy restaurant or crowded room, feel that people are mumbling or not speaking clearly, need to ask people to speak up or repeat themselves and find that men's voices are easier to understand than woman's    In the past 6 months, have you been bothered by leaking of urine? Yes    In general, how would you rate your overall mental or emotional health?  Excellent      PHQ-2 Total Score: 0    Additional concerns today:  No    Do you feel safe in your environment? YES    Have you ever done Advance Care Planning? (For example, a Health Directive, POLST, or a discussion with a medical provider or your loved ones about your wishes): No, advance care planning information given to patient to review.  Patient plans to discuss their wishes with loved ones or provider.         Fall risk  Fallen 2 or more times in the past year?: No  Any fall with injury in the past year?: No    Cognitive Screening   1) Repeat 3 items (Leader, Season, Table)    2) Clock draw:   NORMAL  3) 3 item recall:   Recalls 3 objects  Results: 3 items recalled: COGNITIVE IMPAIRMENT LESS LIKELY    Mini-CogTM Copyright S " [employer]. Licensed by the author for use in [facility]; reprinted with permission (elza@[facility]). All rights reserved.        Reviewed and updated as needed this visit by clinical staff  Tobacco  Allergies  Meds   Med Hx  Surg Hx  Fam Hx  Soc Hx        Reviewed and updated as needed this visit by Provider                Social History     Tobacco Use     Smoking status: Never Smoker     Smokeless tobacco: Never Used   Substance Use Topics     Alcohol use: Yes     If you drink alcohol do you typically have >3 drinks per day or >7 drinks per week? Yes    No flowsheet data found.    Current providers sharing in care for this patient include:   Patient Care Team:  Twin Holcomb DO as PCP - General (Family Practice)  Eamon Cano MD as MD (Urology)  Twin Holcomb DO as Assigned PCP  Eamon Cano MD as Assigned Surgical Provider    The following health maintenance items are reviewed in Epic and correct as of today:  Health Maintenance Due   Topic Date Due     COLORECTAL CANCER SCREENING  Never done     ZOSTER IMMUNIZATION (1 of 2) Never done     Pneumococcal Vaccine: 65+ Years (2 of 2 - PPSV23) 08/05/2020     INFLUENZA VACCINE (1) 09/01/2021     FALL RISK ASSESSMENT  10/05/2021       Review of Systems   Constitutional: Negative for chills and fever.   HENT: Positive for hearing loss. Negative for congestion, ear pain and sore throat.    Eyes: Negative for pain and visual disturbance.   Respiratory: Negative for cough and shortness of breath.    Cardiovascular: Negative for chest pain, palpitations and peripheral edema.   Gastrointestinal: Negative for abdominal pain, constipation, diarrhea, heartburn, hematochezia and nausea.   Genitourinary: Positive for dysuria, frequency, impotence and urgency. Negative for discharge, genital sores and hematuria.   Musculoskeletal: Negative for arthralgias, joint swelling and myalgias.   Skin: Negative for rash.   Neurological: Negative  for dizziness, weakness, headaches and paresthesias.   Psychiatric/Behavioral: Negative for mood changes. The patient is not nervous/anxious.      Constitutional, HEENT, cardiovascular, pulmonary, gi and gu systems are negative, except as otherwise noted.    OBJECTIVE:   /72   Pulse 65   Temp 97.6  F (36.4  C) (Tympanic)   Resp 14   Ht 1.829 m (6')   Wt 90.7 kg (200 lb)   SpO2 98%   BMI 27.12 kg/m   Estimated body mass index is 27.12 kg/m  as calculated from the following:    Height as of this encounter: 1.829 m (6').    Weight as of this encounter: 90.7 kg (200 lb).  Physical Exam  GENERAL: healthy, alert and no distress  EYES: Eyes grossly normal to inspection, PERRL and conjunctivae and sclerae normal  HENT: ear canals and TM's normal, nose and mouth without ulcers or lesions  NECK: no adenopathy, no asymmetry, masses, or scars and thyroid normal to palpation  RESP: lungs clear to auscultation - no rales, rhonchi or wheezes  CV: regular rate and rhythm, normal S1 S2, no S3 or S4, no murmur, click or rub, no peripheral edema and peripheral pulses strong  ABDOMEN: soft, nontender, no hepatosplenomegaly, no masses and bowel sounds normal  MS: no gross musculoskeletal defects noted, no edema  SKIN: no suspicious lesions or rashes  PSYCH: mentation appears normal, affect normal/bright        ASSESSMENT / PLAN:   1. Encounter for Medicare annual wellness exam: Health maintenance reviewed and updated.  He will return for fasting labs.  COVID-19 booster shot given today.  He does describe some dysuria at times.  Will check urinalysis to rule out infection.  If negative, he will be following up with urology.  [facility].  Will be due for repeat colonoscopy in 2022    2. Screen for colon cancer: due in 2022    3. Dysuria  - UA Macro with Reflex to Micro and Culture - lab collect; Future  - UA Macro with Reflex to Micro and Culture - lab collect  - Urine Microscopic    4.  Hyperlipidemia, unspecified hyperlipidemia type  - Lipid panel reflex to direct LDL Fasting; Future    5. Screening for diabetes mellitus  - Comprehensive metabolic panel (BMP + Alb, Alk Phos, ALT, AST, Total. Bili, TP); Future    6. High priority for 2019-nCoV vaccine  - COVID-19,PF,PFIZER      Patient has been advised of split billing requirements and indicates understanding: Yes  COUNSELING:  Reviewed preventive health counseling, as reflected in patient instructions       Regular exercise       Healthy diet/nutrition    Estimated body mass index is 27.12 kg/m  as calculated from the following:    Height as of this encounter: 1.829 m (6').    Weight as of this encounter: 90.7 kg (200 lb).        He reports that he has never smoked. He has never used smokeless tobacco.      Appropriate preventive services were discussed with this patient, including applicable screening as appropriate for cardiovascular disease, diabetes, osteopenia/osteoporosis, and glaucoma.  As appropriate for age/gender, discussed screening for colorectal cancer, prostate cancer, breast cancer, and cervical cancer. Checklist reviewing preventive services available has been given to the patient.    Reviewed patients plan of care and provided an AVS. The Basic Care Plan (routine screening as documented in Health Maintenance) for Joseph meets the Care Plan requirement. This Care Plan has been established and reviewed with the Patient.    Counseling Resources:  ATP IV Guidelines  Pooled Cohorts Equation Calculator  Breast Cancer Risk Calculator  Breast Cancer: Medication to Reduce Risk  FRAX Risk Assessment  ICSI Preventive Guidelines  Dietary Guidelines for Americans, 2010  USDA's MyPlate  ASA Prophylaxis  Lung CA Screening    DO EDEN [facility]    Identified Health Risks:    The patient was provided with written information regarding signs of hearing loss.  Information on urinary incontinence and treatment  options given to patient.

## 2021-10-11 NOTE — PATIENT INSTRUCTIONS
Patient Education   Personalized Prevention Plan  You are due for the preventive services outlined below.  Your care team is available to assist you in scheduling these services.  If you have already completed any of these items, please share that information with your care team to update in your medical record.  Health Maintenance Due   Topic Date Due     Colorectal Cancer Screening  Never done     Zoster (Shingles) Vaccine (1 of 2) Never done     Pneumococcal Vaccine (2 of 2 - PPSV23) 08/05/2020     Flu Vaccine (1) 09/01/2021     ANNUAL REVIEW OF HM ORDERS  10/05/2021     FALL RISK ASSESSMENT  10/05/2021       Signs of Hearing Loss      Hearing much better with one ear can be a sign of hearing loss.   Hearing loss is a problem shared by many people. In fact, it is one of the most common health problems, particularly as people age. Most people age 65 and older have some hearing loss. By age 80, almost everyone does. Hearing loss often occurs slowly over the years. So you may not realize your hearing has gotten worse.  Have your hearing checked  Call your healthcare provider if you:    Have to strain to hear normal conversation    Have to watch other people s faces very carefully to follow what they re saying    Need to ask people to repeat what they ve said    Often misunderstand what people are saying    Turn the volume of the television or radio up so high that others complain    Feel that people are mumbling when they re talking to you    Find that the effort to hear leaves you feeling tired and irritated    Notice, when using the phone, that you hear better with one ear than the other  Pager last reviewed this educational content on 1/1/2020 2000-2021 [employer]. All rights reserved. This information is not intended as a substitute for professional medical care. Always follow your healthcare professional's instructions.          Urinary Incontinence (Male)    Urinary incontinence means  not being able to control the release of urine from the bladder.   Causes  Common causes of urinary incontinence in men include:    Infection    Certain medicines    Aging    Poor pelvic muscle tone    Bladder spasms    Obesity    Trouble urinating and fully emptying the bladder (urinary retention)  Other things that can cause incontinence are:     Nervous system diseases    Diabetes    Sleep apnea    Urinary tract infections    Prostate surgery    Pelvic injury  Constipation and smoking have also been identified as risk factors.   Symptoms    Urge incontinence (overactive bladder). This is a sudden urge to urinate. It occurs even though there may not be much urine in the bladder. The need to urinate often during the night is common. It's due to bladder spasms.    Stress incontinence. This is urine leakage that you can't control. It can occur with sneezing, coughing, and other actions that put stress on the bladder.    Treatment  Treatment depends on what is causing the condition. Bladder infections are treated with antibiotics. Urinary retention is treated with a bladder catheter.   Home care  Follow these guidelines when caring for yourself at home:    Don't have any foods and drinks that may irritate the bladder. This includes:  ? Chocolate  ? Alcohol  ? Caffeine  ? Carbonated drinks  ? Acidic fruits and juices    Limit fluids to 6 to 8 cups a day.    Lose weight if you are overweight. This will reduce your symptoms.    If advised, do regular pelvic muscle-strengthening exercises such as Kegel exercises.    If needed, wear absorbent pads to catch urine. Change the pads often. This is for good hygiene and to prevent skin and bladder infections.    Bathe daily for good hygiene.    If an antibiotic was prescribed to treat a bladder infection, take it until it's finished. Keep taking it even if you are feeling better. This is to make sure your infection has cleared.    If a catheter was left in place, keep bacteria  from getting into the collection bag. Don't disconnect the catheter from the collection bag.    Use a leg band to secure the catheter drainage tube, so it does not pull on the catheter. Drain the collection bag when it becomes full. To do this, use the drain spout at the bottom of the bag. Don't disconnect the bag from the catheter.    Don't pull on or try to remove a catheter. The catheter must be removed by a healthcare provider.    If you smoke, stop. Ask your provider for help if you can't do this on your own.  Follow-up care  Follow up with your healthcare provider, or as advised.  When to get medical advice  Call your healthcare provider right away if any of these occur:    Fever over 100.4 F (38 C), or as directed by your provider    Bladder pain or fullness    Belly swelling, nausea, or vomiting    Back pain    Weakness, dizziness, or fainting    If a catheter was left in place, return if:  ? The catheter falls out  ? The catheter stops draining for 6 hours  ? Your urine gets cloudy or smells bad  [employer] last reviewed this educational content on 1/1/2020 2000-2021 [employer]. All rights reserved. This information is not intended as a substitute for professional medical care. Always follow your healthcare professional's instructions.

## 2021-10-25 ENCOUNTER — LAB (OUTPATIENT)
Dept: LAB | Facility: CLINIC | Age: 66
End: 2021-10-25
Payer: MEDICARE

## 2021-10-25 DIAGNOSIS — E78.5 HYPERLIPIDEMIA, UNSPECIFIED HYPERLIPIDEMIA TYPE: ICD-10-CM

## 2021-10-25 DIAGNOSIS — Z13.1 SCREENING FOR DIABETES MELLITUS: ICD-10-CM

## 2021-10-25 LAB
ALBUMIN SERPL-MCNC: 3.4 G/DL (ref 3.4–5)
ALP SERPL-CCNC: 117 U/L (ref 40–150)
ALT SERPL W P-5'-P-CCNC: 33 U/L (ref 0–70)
ANION GAP SERPL CALCULATED.3IONS-SCNC: 6 MMOL/L (ref 3–14)
AST SERPL W P-5'-P-CCNC: 22 U/L (ref 0–45)
BILIRUB SERPL-MCNC: 0.3 MG/DL (ref 0.2–1.3)
BUN SERPL-MCNC: 18 MG/DL (ref 7–30)
CALCIUM SERPL-MCNC: 8.9 MG/DL (ref 8.5–10.1)
CHLORIDE BLD-SCNC: 106 MMOL/L (ref 94–109)
CHOLEST SERPL-MCNC: 193 MG/DL
CO2 SERPL-SCNC: 25 MMOL/L (ref 20–32)
CREAT SERPL-MCNC: 0.96 MG/DL (ref 0.66–1.25)
FASTING STATUS PATIENT QL REPORTED: YES
GFR SERPL CREATININE-BSD FRML MDRD: 81 ML/MIN/1.73M2
GLUCOSE BLD-MCNC: 103 MG/DL (ref 70–99)
HDLC SERPL-MCNC: 35 MG/DL
LDLC SERPL CALC-MCNC: 95 MG/DL
NONHDLC SERPL-MCNC: 158 MG/DL
POTASSIUM BLD-SCNC: 4.3 MMOL/L (ref 3.4–5.3)
PROT SERPL-MCNC: 8.4 G/DL (ref 6.8–8.8)
SODIUM SERPL-SCNC: 137 MMOL/L (ref 133–144)
TRIGL SERPL-MCNC: 313 MG/DL

## 2021-10-25 PROCEDURE — 80053 COMPREHEN METABOLIC PANEL: CPT

## 2021-10-25 PROCEDURE — 36415 COLL VENOUS BLD VENIPUNCTURE: CPT

## 2021-10-25 PROCEDURE — 80061 LIPID PANEL: CPT

## 2021-10-29 ENCOUNTER — MYC MEDICAL ADVICE (OUTPATIENT)
Dept: FAMILY MEDICINE | Facility: CLINIC | Age: 66
End: 2021-10-29

## 2021-10-29 DIAGNOSIS — Z91.89 RISK OF MYOCARDIAL INFARCTION OR STROKE 7.5% OR GREATER IN NEXT 10 YEARS: Primary | ICD-10-CM

## 2021-10-29 RX ORDER — ATORVASTATIN CALCIUM 20 MG/1
20 TABLET, FILM COATED ORAL DAILY
Qty: 90 TABLET | Refills: 3 | Status: SHIPPED | OUTPATIENT
Start: 2021-10-29 | End: 2022-03-01

## 2021-10-29 NOTE — TELEPHONE ENCOUNTER
Routing to provider to review and advise.     Statin recommended based on recent labs.     Fernanda Villasenor RN  [facility]

## 2021-11-02 NOTE — TELEPHONE ENCOUNTER
Routing to provider to review and advise.     Atorvastatin recently sent in.     Fernanda Villasenor RN  [place] [facility]

## 2021-11-03 NOTE — TELEPHONE ENCOUNTER
Per uptodate.     ?Grapefruit juice - Grapefruit juice inhibits intestinal CY; however, daily consumption of 8 oz (240 mL) or less of grapefruit juice, or one-half of a grapefruit or less, is unlikely to increase the risk of an adverse interaction or muscle injury with most statins.          Sharonda Sosa, FNP-BC abnormal

## 2021-12-30 ENCOUNTER — LAB (OUTPATIENT)
Dept: LAB | Facility: CLINIC | Age: 66
End: 2021-12-30
Payer: MEDICARE

## 2021-12-30 DIAGNOSIS — C61 PROSTATE CANCER (H): ICD-10-CM

## 2021-12-30 LAB — PSA SERPL-MCNC: 0.01 UG/L (ref 0–4)

## 2021-12-30 PROCEDURE — 36415 COLL VENOUS BLD VENIPUNCTURE: CPT

## 2021-12-30 PROCEDURE — 84153 ASSAY OF PSA TOTAL: CPT

## 2022-01-07 ENCOUNTER — OFFICE VISIT (OUTPATIENT)
Dept: UROLOGY | Facility: CLINIC | Age: 67
End: 2022-01-07
Payer: MEDICARE

## 2022-01-07 VITALS
HEIGHT: 72 IN | DIASTOLIC BLOOD PRESSURE: 80 MMHG | BODY MASS INDEX: 26.41 KG/M2 | SYSTOLIC BLOOD PRESSURE: 138 MMHG | WEIGHT: 195 LBS

## 2022-01-07 DIAGNOSIS — R35.0 URINARY FREQUENCY: ICD-10-CM

## 2022-01-07 DIAGNOSIS — C61 PROSTATE CANCER (H): Primary | ICD-10-CM

## 2022-01-07 PROCEDURE — 99213 OFFICE O/P EST LOW 20 MIN: CPT | Performed by: PHYSICIAN ASSISTANT

## 2022-01-07 ASSESSMENT — ENCOUNTER SYMPTOMS
FEVER: 0
NAUSEA: 0
FREQUENCY: 1
UNEXPECTED WEIGHT CHANGE: 0
VOMITING: 0
DYSURIA: 0
SHORTNESS OF BREATH: 0
APPETITE CHANGE: 0
CHILLS: 0
HEMATURIA: 0
FATIGUE: 0

## 2022-01-07 ASSESSMENT — PAIN SCALES - GENERAL: PAINLEVEL: NO PAIN (0)

## 2022-01-07 ASSESSMENT — MIFFLIN-ST. JEOR: SCORE: 1692.51

## 2022-01-07 NOTE — LETTER
1/7/2022       RE: Joseph Grissom  [address]  [place] [address]     Dear Colleague,    Thank you for referring your patient, Joseph Grissom, to the [facility] [place] at [facility]. Please see a copy of my visit note below.    Subjective      CHIEF COMPLAINT/REASON FOR VISIT   Dr. Cano's patient  Prostate cancer follow up    HISTORY OF PRESENT ILLNESS   Mr. Grissom is a very pleasant 66-year-old gentleman, who presents today for follow-up regarding his prostate cancer.  He was diagnosed with [place] 4+4 = 8 adenocarcinoma the prostate.  He also had evidence of [place] 7.  Yesterday was 9.02 at the time of diagnosis.    He underwent 39 fractions of radiation was recommended to continue with androgen deprivation therapy for 2 years.  He is currently undergone Four-6 6-month injections of Lupron.  He generally has tolerated these okay.  He does note some hot flashes.  He also will have some urinary frequency, but he attributes this much more to the radiation.  He has had urinalysis for evaluation of this and no signs of infection.    Patient has remained active.  He is cross-country skiing.  When the weather is better, he will run.    He endorses nocturia 3-4 times.    He had a lesion behind the penis which showed possible lipoma on previous examination.  Patient notes that this has been stable and had no changing or worrisome symptomatology.    Patient is continuing to drink coffee.  He does note that this irritates his urinary symptoms more.  He does try to keep up with fluids to try to prevent tingling.  He is due for 6-month Lupron today.  Today's injection will be 5 of 6.    Denies any bone pain.  The following portions of the patient's history were reviewed and updated as appropriate: allergies, current medications, past family history, past medical history, past social history, past surgical history, and problem  list.     REVIEW OF SYSTEMS   Review of Systems   Constitutional: Negative for appetite change, chills, fatigue, fever and unexpected weight change.   Respiratory: Negative for shortness of breath.    Cardiovascular: Negative for chest pain.   Gastrointestinal: Negative for nausea and vomiting.   Genitourinary: Positive for frequency and urgency. Negative for dysuria and hematuria.      Per HPI.     Patient Active Problem List   Diagnosis     Prostate cancer (H)      Past Medical History:   Diagnosis Date     Hyperlipidemia      Mumps         Objective      PHYSICAL EXAM   /80   Ht 1.829 m (6')   Wt 88.5 kg (195 lb)   BMI 26.45 kg/m     Physical Exam  Constitutional:       Appearance: Normal appearance.   HENT:      Head: Normocephalic.      Nose: Nose normal.   Eyes:      General: No scleral icterus.  Pulmonary:      Effort: Pulmonary effort is normal.   Musculoskeletal:         General: Normal range of motion.   Neurological:      General: No focal deficit present.      Mental Status: He is alert and oriented to person, place, and time.   Psychiatric:         Mood and Affect: Mood normal.         Behavior: Behavior normal.       LABORATORY     Lab Results   Component Value Date    PSA 0.01 12/30/2021    PSA 0.01 06/23/2021      Recent Labs   Lab Test 10/11/21  1522   COLOR Yellow   APPEARANCE Clear   URINEGLC Negative   URINEBILI Negative   URINEKETONE Negative   SG 1.025   UBLD Trace*   URINEPH 7.0   PROTEIN Negative   UROBILINOGEN 0.2   NITRITE Negative   LEUKEST Negative   RBCU 0-2   WBCU 0-5     Assessment & Plan    1. Prostate cancer (H)    2. Urinary frequency      I had the pleasure today meeting with Mr. Grissom to discuss his follow-up for his prostate cancer.  His PSA remains stable at 0.01.  He is tolerating the Lupron with minimal side effects.    -Recommendation would be for a 6-month Lupron injection today.  This is injection 5 of 6.    We discussed a scrotal lesion.  This is remained  stable.  Would recommend continuing to monitor.    -Would recommend continued good hydration.  I have previously discussed that if discomfort continues, would recommend possible cystoscopy.  Patient also notes the role of bladder irritants, but would like to continue with his coffee.    -Follow-up in 6 months with PSA, 6-month Lupron, and office visit.  That session will be 6 of 6 for Lupron injections.    Contact us in the interim with questions, concerns, or changes in symptomatology.    Signed by:       Adele Garza PA-C 1/7/2022 2:31 PM

## 2022-01-07 NOTE — PATIENT INSTRUCTIONS
Follow-up in 6 months with repeat PSA, 6-month Lupron, and office visit. (That one will be 6/6).       We will continue to monitor scrotal lesion.    Contact us in the interim with questions, concerns, or changes in symptomatology.

## 2022-01-07 NOTE — NURSING NOTE
Joseph Grissom comes into clinic today at the request of MIRANDA Mauricio, Ordering Provider for Med Injection only Lupron 45 mg.    The following medication was given:     MEDICATION:  Lupron Depot 45 mg  ROUTE: IM  SITE: LUQ - Gluteus  DOSE: 45 mg  LOT #: [id number]  : Eileen  EXPIRATION DATE:  16 JAN 2024  NDC#: 1927-6006-13   Was there drug waste? No  Multi-dose vial: No    ASHLEY Cueto  January 7, 2022      This service provided today was under the supervising provider of the day MIRANDA Mauricio, who was available if needed.    Lara Begum EMT

## 2022-01-07 NOTE — PROGRESS NOTES
Subjective      CHIEF COMPLAINT/REASON FOR VISIT   Dr. Cano's patient  Prostate cancer follow up    HISTORY OF PRESENT ILLNESS   Mr. Grissom is a very pleasant 66-year-old gentleman, who presents today for follow-up regarding his prostate cancer.  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  He also had evidence of [place] 7.  Yesterday was 9.02 at the time of diagnosis.    He underwent 39 fractions of radiation was recommended to continue with androgen deprivation therapy for 2 years.  He is currently undergone Four-6 6-month injections of Lupron.  He generally has tolerated these okay.  He does note some hot flashes.  He also will have some urinary frequency, but he attributes this much more to the radiation.  He has had urinalysis for evaluation of this and no signs of infection.    Patient has remained active.  He is cross-country skiing.  When the weather is better, he will run.    He endorses nocturia 3-4 times.    He had a lesion behind the penis which showed possible lipoma on previous examination.  Patient notes that this has been stable and had no changing or worrisome symptomatology.    Patient is continuing to drink coffee.  He does note that this irritates his urinary symptoms more.  He does try to keep up with fluids to try to prevent tingling.  He is due for 6-month Lupron today.  Today's injection will be 5 of 6.    Denies any bone pain.  The following portions of the patient's history were reviewed and updated as appropriate: allergies, current medications, past family history, past medical history, past social history, past surgical history, and problem list.     REVIEW OF SYSTEMS   Review of Systems   Constitutional: Negative for appetite change, chills, fatigue, fever and unexpected weight change.   Respiratory: Negative for shortness of breath.    Cardiovascular: Negative for chest pain.   Gastrointestinal: Negative for nausea and vomiting.   Genitourinary: Positive  for frequency and urgency. Negative for dysuria and hematuria.      Per HPI.     Patient Active Problem List   Diagnosis     Prostate cancer (H)      Past Medical History:   Diagnosis Date     Hyperlipidemia      Mumps         Objective      PHYSICAL EXAM   /80   Ht 1.829 m (6')   Wt 88.5 kg (195 lb)   BMI 26.45 kg/m     Physical Exam  Constitutional:       Appearance: Normal appearance.   HENT:      Head: Normocephalic.      Nose: Nose normal.   Eyes:      General: No scleral icterus.  Pulmonary:      Effort: Pulmonary effort is normal.   Musculoskeletal:         General: Normal range of motion.   Neurological:      General: No focal deficit present.      Mental Status: He is alert and oriented to person, place, and time.   Psychiatric:         Mood and Affect: Mood normal.         Behavior: Behavior normal.       LABORATORY     Lab Results   Component Value Date    PSA 0.01 12/30/2021    PSA 0.01 06/23/2021      Recent Labs   Lab Test 10/11/21  1522   COLOR Yellow   APPEARANCE Clear   URINEGLC Negative   URINEBILI Negative   URINEKETONE Negative   SG 1.025   UBLD Trace*   URINEPH 7.0   PROTEIN Negative   UROBILINOGEN 0.2   NITRITE Negative   LEUKEST Negative   RBCU 0-2   WBCU 0-5     Assessment & Plan    1. Prostate cancer (H)    2. Urinary frequency      I had the pleasure today meeting with Mr. Grissom to discuss his follow-up for his prostate cancer.  His PSA remains stable at 0.01.  He is tolerating the Lupron with minimal side effects.    -Recommendation would be for a 6-month Lupron injection today.  This is injection 5 of 6.    We discussed a scrotal lesion.  This is remained stable.  Would recommend continuing to monitor.    -Would recommend continued good hydration.  I have previously discussed that if discomfort continues, would recommend possible cystoscopy.  Patient also notes the role of bladder irritants, but would like to continue with his coffee.    -Follow-up in 6 months with PSA,  6-month Lupron, and office visit.  That session will be 6 of 6 for Lupron injections.    Contact us in the interim with questions, concerns, or changes in symptomatology.    Signed by:       Adele Garza PA-C 1/7/2022 2:31 PM

## 2022-01-07 NOTE — NURSING NOTE
Chief Complaint   Patient presents with     Prostate Cancer     6 month follow up with PSA       Anna Clancy CMA

## 2022-01-11 ENCOUNTER — TELEPHONE (OUTPATIENT)
Dept: UROLOGY | Facility: CLINIC | Age: 67
End: 2022-01-11
Payer: MEDICARE

## 2022-01-11 NOTE — TELEPHONE ENCOUNTER
----- Message from Marii Delgado sent at 1/10/2022 11:09 AM CST -----  repeat PSA, 6-month Lupron, and office visit.   [facility]

## 2022-02-28 DIAGNOSIS — Z91.89 RISK OF MYOCARDIAL INFARCTION OR STROKE 7.5% OR GREATER IN NEXT 10 YEARS: ICD-10-CM

## 2022-03-01 RX ORDER — ATORVASTATIN CALCIUM 20 MG/1
20 TABLET, FILM COATED ORAL DAILY
Qty: 90 TABLET | Refills: 1 | Status: SHIPPED | OUTPATIENT
Start: 2022-03-01 | End: 2022-09-12

## 2022-03-29 ENCOUNTER — NURSE TRIAGE (OUTPATIENT)
Dept: NURSING | Facility: CLINIC | Age: 67
End: 2022-03-29
Payer: MEDICARE

## 2022-03-29 NOTE — TELEPHONE ENCOUNTER
Triage call  Patient calling to report that he found a lump on rib cage inch lower than his nipple on the left side two inches round 1/4 inch deep.  He noticed it about a couple weeks ago and was encouraged by his friends to have it looked at.  It is not painful,does not itch there is nothing draining from it.    Per protocol see in office in the nest 3 days,Care advice given.  Verbalizes understanding and agrees with plan.  Transferred to scheduling.    Lala Ballard RN   [facility] Nurse Advisor  11:31 AM 3/29/2022    COVID 19 Nurse Triage Plan/Patient Instructions    Please be aware that novel coronavirus (COVID-19) may be circulating in the community. If you develop symptoms such as fever, cough, or SOB or if you have concerns about the presence of another infection including coronavirus (COVID-19), please contact your health care provider or visit [email or web address].     Disposition/Instructions    In-Person Visit with provider recommended. Reference Visit Selection Guide.    Thank you for taking steps to prevent the spread of this virus.  o Limit your contact with others.  o Wear a simple mask to cover your cough.  o Wash your hands well and often.    Resources    [facility]: About COVID-19: [email or web address]    CDC: What to Do If You're Sick: www.cdc.gov/coronavirus/2019-ncov/about/steps-when-sick.html    CDC: Ending Home Isolation: www.cdc.gov/coronavirus/2019-ncov/hcp/disposition-in-home-patients.html     CDC: Caring for Someone: www.cdc.gov/coronavirus/2019-ncov/if-you-are-sick/care-for-someone.html     [facility]: Interim Guidance for Hospital Discharge to Home: www.health.[facility].[place]/diseases/coronavirus/hcp/hospdischarge.pdf    [facility] clinical trials (COVID-19 research studies): clinicalaffairs.[facility]/umn-clinical-trials     Below are the COVID-19 hotlines at the [place] Department of Health ([facility]). Interpreters are available.   o For [employer]  questions: Call 736-442-5318 or 1-146.585.8307 (7 a.m. to 7 p.m.)  o For questions about schools and childcare: Call 894-116-4339 or 1-287.953.3590 (7 a.m. to 7 p.m.)                     Reason for Disposition    [1] Small swelling or lump AND [2] unexplained AND [3] present > 1 week    Additional Information    Negative: Small growth, spot, bump, or pigmented area of skin (e.g., moles, skin tags, wart, melanoma, skin cancer)    Negative: Inguinal hernia previously diagnosed by a physician    Negative: Followed a skin injury    Negative: Follows an insect bite    Negative: Swelling of lymph node suspected    Negative: Swelling of vaccination site    Negative: Swelling of tongue    Negative: Swelling of lip    Negative: Swelling of eye    Negative: Swelling of entire face    Negative: Swelling of scrotum    Negative: Swelling of labia    Negative: Swelling of surgical incision    Negative: Swelling of ankle joint    Negative: Swelling of elbow joint    Negative: Swelling of knee joint    Negative: Swelling with a skin rash    Negative: Patient sounds very sick or weak to the triager    Negative: SEVERE pain (e.g., excruciating)    Negative: [1] Swelling is painful to touch AND [2] fever    Negative: [1] Swelling is red AND [2] fever    Negative: [1] Swelling is red AND [2] size > 2 inches (5.0 cm) (Exception: itchy area of skin)    Negative: [1] Swelling of groin (inguinal area) AND [2] painful    Negative: [1] Swelling is painful to touch AND [2] no fever    Negative: Looks like a boil, infected sore, deep ulcer or other infected rash    Protocols used: SKIN LUMP OR LOCALIZED SWELLING-A-AH

## 2022-04-01 ENCOUNTER — OFFICE VISIT (OUTPATIENT)
Dept: INTERNAL MEDICINE | Facility: CLINIC | Age: 67
End: 2022-04-01
Payer: MEDICARE

## 2022-04-01 DIAGNOSIS — D17.30 LIPOMA OF SKIN AND SUBCUTANEOUS TISSUE: Primary | ICD-10-CM

## 2022-04-01 PROCEDURE — 99213 OFFICE O/P EST LOW 20 MIN: CPT | Performed by: INTERNAL MEDICINE

## 2022-04-01 NOTE — PATIENT INSTRUCTIONS
The lump beneath the skin in your left chest is a Lipoma, or fatty deposit. I feel confident about it. However, some potential measures to take in the future, especially if it changes, would include:    Ultrasound  Surgery consult/possible removal.     See Dr Holcomb for you Annual Wellness visit in the second half of October, 2022. Call either of us sooner if any concerns.

## 2022-04-01 NOTE — PROGRESS NOTES
Assessment & Plan   (D17.30) Lipoma of skin and subcutaneous tissue  (primary encounter diagnosis)  Comment: Reassurance. Discussed options such as ultrasound or CT imaging, surgery consult. Patient declines this options as he is reassured by our conversation.   Plan: F/u for changes/worsening.     No follow-ups on file.     Patient Instructions   The lump beneath the skin in your left chest is a Lipoma, or fatty deposit. I feel confident about it. However, some potential measures to take in the future, especially if it changes, would include:    Ultrasound  Surgery consult/possible removal.     See Dr Holcomb for you Annual Wellness visit in the second half of October, 2022. Call either of us sooner if any concerns.      Oskar Hamilton MD,   [facility] KIT Pitts is a 66 year old who presents for the following health issues   Patient is being seen for an lump on rib cage.  Mass    History of Present Illness       Reason for visit:  Lump on left rib cage below breast.  Symptom onset:  3-4 weeks ago  Symptoms include:  I just felt it when showering.  Symptom intensity:  Mild  Symptom progression:  Staying the same  Had these symptoms before:  No  What makes it worse:  No  What makes it better:  No    He eats 4 or more servings of fruits and vegetables daily.He consumes 0 sweetened beverage(s) daily.He exercises with enough effort to increase his heart rate 20 to 29 minutes per day.  He exercises with enough effort to increase his heart rate 4 days per week.   He is taking medications regularly.     He just recently noted a palpable fleshy lump beneath the skiin along his left anterolateral chest wall while in the shower. No pain or tenderness noted.     Past medical, family and social histories as well as medications reviewed and updated as needed.    Review of Systems   REVIEW OF SYSTEMS: The following systems have been completely reviewed and are negative except as noted  "above:   Constitutional, musculoskeletal, dermatologic systems.        Objective    Vitals: /79   Pulse 79   Temp 97.4  F (36.3  C) (Tympanic)   Resp 16   Ht 1.93 m (6' 4\")   Wt 90.1 kg (198 lb 11.2 oz)   SpO2 97%   BMI 24.19 kg/m    BMI= Body mass index is 24.19 kg/m .    Physical Exam   GENERAL: healthy, alert and no distress  RESP: lungs clear to auscultation - no rales, rhonchi or wheezes  CV: regular rate and rhythm, normal S1 S2, no S3 or S4, no murmur, click or rub, no peripheral edema and peripheral pulses strong  MS: no gross musculoskeletal defects noted, no edema  SKIN: visible and palpable subcutaneous lump in the left anterolateral chest wall, several cm in diameter.           "

## 2022-05-18 VITALS
OXYGEN SATURATION: 97 % | RESPIRATION RATE: 16 BRPM | SYSTOLIC BLOOD PRESSURE: 137 MMHG | BODY MASS INDEX: 26.91 KG/M2 | DIASTOLIC BLOOD PRESSURE: 79 MMHG | HEIGHT: 72 IN | WEIGHT: 198.7 LBS | HEART RATE: 79 BPM | TEMPERATURE: 97.4 F

## 2022-07-07 ENCOUNTER — LAB (OUTPATIENT)
Dept: LAB | Facility: CLINIC | Age: 67
End: 2022-07-07
Payer: MEDICARE

## 2022-07-07 DIAGNOSIS — C61 PROSTATE CANCER (H): ICD-10-CM

## 2022-07-07 LAB — PSA SERPL-MCNC: <0.01 UG/L (ref 0–4)

## 2022-07-07 PROCEDURE — 36415 COLL VENOUS BLD VENIPUNCTURE: CPT

## 2022-07-07 PROCEDURE — 84153 ASSAY OF PSA TOTAL: CPT

## 2022-07-13 ENCOUNTER — OFFICE VISIT (OUTPATIENT)
Dept: UROLOGY | Facility: CLINIC | Age: 67
End: 2022-07-13
Payer: MEDICARE

## 2022-07-13 VITALS
DIASTOLIC BLOOD PRESSURE: 85 MMHG | BODY MASS INDEX: 26.28 KG/M2 | SYSTOLIC BLOOD PRESSURE: 154 MMHG | HEART RATE: 56 BPM | HEIGHT: 72 IN | WEIGHT: 194 LBS

## 2022-07-13 DIAGNOSIS — C61 PROSTATE CANCER (H): Primary | ICD-10-CM

## 2022-07-13 PROCEDURE — 99213 OFFICE O/P EST LOW 20 MIN: CPT | Performed by: UROLOGY

## 2022-07-13 PROCEDURE — 96402 CHEMO HORMON ANTINEOPL SQ/IM: CPT | Performed by: PHYSICIAN ASSISTANT

## 2022-07-13 ASSESSMENT — PAIN SCALES - GENERAL: PAINLEVEL: NO PAIN (0)

## 2022-07-13 NOTE — NURSING NOTE
Chief Complaint   Patient presents with     Prostate Cancer     6 month follow up with PSA     The following medication was given:     MEDICATION:  Lupron Depot 45 mg  ROUTE: IM  SITE: [facility]  DOSE: 45 mg  LOT #: [id number]  : [employer]  EXPIRATION DATE: 09/30/2024  NDC#: 6241-6910-92   Was there drug waste? No  Multi-dose vial: No    Anna Clancy CMA  July 13, 2022    Anna Clancy CMA

## 2022-07-13 NOTE — PROGRESS NOTES
Office Visit Note  [facility]  [phone]    UROLOGIC DIAGNOSES:   High risk prostate cancer  Erectile dysfunction    CURRENT INTERVENTIONS:   Radiotherapy was 3 years hormonal therapy  Viagra    HISTORY:   Gisselle returns to clinic today for prostate cancer follow-up.  He has continued with his planned Lupron injections in addition to his completed radiotherapy and he has done well.  His PSA is undetectable.  He is here today for his 6th and final Lupron injection.  He reports feeling well with no urinary symptoms or complaints.      PAST MEDICAL HISTORY:   Past Medical History:   Diagnosis Date     Hyperlipidemia      Mumps        PAST SURGICAL HISTORY:   Past Surgical History:   Procedure Laterality Date     APPENDECTOMY       FACIAL RECONSTRUCTION SURGERY      had cheek bone fixed after trauma     HERNIA REPAIR       VASECTOMY       VASECTOMY       vasectomy reversal       VASOVASOSTOMY         FAMILY HISTORY:   Family History   Problem Relation Age of Onset     Cancer Brother         exposed to agent orange     Hypertension Mother      Hypertension Father      Diabetes Father      Skin Cancer Father      Alzheimer Disease Father      Diabetes Brother        SOCIAL HISTORY:   Social History     Socioeconomic History     Marital status:      Spouse name: None     Number of children: None     Years of education: None     Highest education level: None   Tobacco Use     Smoking status: Never Smoker     Smokeless tobacco: Never Used   Vaping Use     Vaping Use: Never used   Substance and Sexual Activity     Alcohol use: Yes     Drug use: Not Currently     Sexual activity: Yes     Partners: Female       Review Of Systems:  Skin: No rash, pruritis, or skin pigmentation  Eyes: No changes in vision  Ears/Nose/Throat: No changes in hearing, no nosebleeds  Respiratory: No shortness of breath, dyspnea on exertion, cough, or hemoptysis  Cardiovascular: No chest pain or palpitations  Gastrointestinal: No  diarrhea or constipation. No abdominal pain. No hematochezia  Genitourinary: see HPI  Musculoskeletal: No pain or swelling of joints, normal range of motion  Neurologic: No weakness or tremors  Psychiatric: No recent changes in memory or mood  Hematologic/Lymphatic/Immunologic: No easy bruising or enlarged lymph nodes  Endocrine: No weight gain or loss      PHYSICAL EXAM:    BP (!) 154/85   Pulse 56   Ht 1.829 m (6')   Wt 88 kg (194 lb)   BMI 26.31 kg/m      Constitutional: Well developed. Conversant and in no acute distress  Eyes: Anicteric sclera, conjunctiva clear, normal extraocular movements  ENT: Normocephalic and atraumatic,   Skin: Warm and dry. No rashes or lesions  Cardiac: No peripheral edema  Back/Flank: Not done  CNS/PNS: Normal musculature and movements, moves all extremities normally  Respiratory: Normal non-labored breathing  Abdomen: Soft nontender and nondistended  Peripheral Vascular: No peripheral edema  Mental Status/Psych: Alert and Oriented x 3. Normal mood and affect    Penis: Not done  Scrotal Skin: Not done  Testicles: Not done  Epididymis: Not done  Digital Rectal Exam:     Cystoscopy: Not done    Imaging: None    Urinalysis: UA RESULTS:  Recent Labs   Lab Test 10/11/21  1522   COLOR Yellow   APPEARANCE Clear   URINEGLC Negative   URINEBILI Negative   URINEKETONE Negative   SG 1.025   UBLD Trace*   URINEPH 7.0   PROTEIN Negative   UROBILINOGEN 0.2   NITRITE Negative   LEUKEST Negative   RBCU 0-2   WBCU 0-5       PSA: Undetectable    Post Void Residual:     Other labs: None today      IMPRESSION:  Doing well, PSA undetectable    PLAN:  We discussed the plan from here.  He received his sixth and final injection of Lupron today.  I will see him back in 9 months for his next PSA check.      Eamon Cano M.D.

## 2022-07-13 NOTE — LETTER
7/13/2022       RE: Joseph Grissom  [address]  [place] [address]     Dear Colleague,    Thank you for referring your patient, Joseph Grissom, to the [facility] [place] at [facility]. Please see a copy of my visit note below.    Office Visit Note  [facility]  [phone]    UROLOGIC DIAGNOSES:   High risk prostate cancer  Erectile dysfunction    CURRENT INTERVENTIONS:   Radiotherapy was 3 years hormonal therapy  Viagra    HISTORY:   Gisselle returns to clinic today for prostate cancer follow-up.  He has continued with his planned Lupron injections in addition to his completed radiotherapy and he has done well.  His PSA is undetectable.  He is here today for his 6th and final Lupron injection.  He reports feeling well with no urinary symptoms or complaints.      PAST MEDICAL HISTORY:   Past Medical History:   Diagnosis Date     Hyperlipidemia      Mumps        PAST SURGICAL HISTORY:   Past Surgical History:   Procedure Laterality Date     APPENDECTOMY       FACIAL RECONSTRUCTION SURGERY      had cheek bone fixed after trauma     HERNIA REPAIR       VASECTOMY       VASECTOMY       vasectomy reversal       VASOVASOSTOMY         FAMILY HISTORY:   Family History   Problem Relation Age of Onset     Cancer Brother         exposed to agent orange     Hypertension Mother      Hypertension Father      Diabetes Father      Skin Cancer Father      Alzheimer Disease Father      Diabetes Brother        SOCIAL HISTORY:   Social History     Socioeconomic History     Marital status:      Spouse name: None     Number of children: None     Years of education: None     Highest education level: None   Tobacco Use     Smoking status: Never Smoker     Smokeless tobacco: Never Used   Vaping Use     Vaping Use: Never used   Substance and Sexual Activity     Alcohol use: Yes     Drug use: Not Currently     Sexual activity: Yes      Partners: Female       Review Of Systems:  Skin: No rash, pruritis, or skin pigmentation  Eyes: No changes in vision  Ears/Nose/Throat: No changes in hearing, no nosebleeds  Respiratory: No shortness of breath, dyspnea on exertion, cough, or hemoptysis  Cardiovascular: No chest pain or palpitations  Gastrointestinal: No diarrhea or constipation. No abdominal pain. No hematochezia  Genitourinary: see HPI  Musculoskeletal: No pain or swelling of joints, normal range of motion  Neurologic: No weakness or tremors  Psychiatric: No recent changes in memory or mood  Hematologic/Lymphatic/Immunologic: No easy bruising or enlarged lymph nodes  Endocrine: No weight gain or loss      PHYSICAL EXAM:    BP (!) 154/85   Pulse 56   Ht 1.829 m (6')   Wt 88 kg (194 lb)   BMI 26.31 kg/m      Constitutional: Well developed. Conversant and in no acute distress  Eyes: Anicteric sclera, conjunctiva clear, normal extraocular movements  ENT: Normocephalic and atraumatic,   Skin: Warm and dry. No rashes or lesions  Cardiac: No peripheral edema  Back/Flank: Not done  CNS/PNS: Normal musculature and movements, moves all extremities normally  Respiratory: Normal non-labored breathing  Abdomen: Soft nontender and nondistended  Peripheral Vascular: No peripheral edema  Mental Status/Psych: Alert and Oriented x 3. Normal mood and affect    Penis: Not done  Scrotal Skin: Not done  Testicles: Not done  Epididymis: Not done  Digital Rectal Exam:     Cystoscopy: Not done    Imaging: None    Urinalysis: UA RESULTS:  Recent Labs   Lab Test 10/11/21  1522   COLOR Yellow   APPEARANCE Clear   URINEGLC Negative   URINEBILI Negative   URINEKETONE Negative   SG 1.025   UBLD Trace*   URINEPH 7.0   PROTEIN Negative   UROBILINOGEN 0.2   NITRITE Negative   LEUKEST Negative   RBCU 0-2   WBCU 0-5       PSA: Undetectable    Post Void Residual:     Other labs: None today      IMPRESSION:  Doing well, PSA undetectable    PLAN:  We discussed the plan from here.   He received his sixth and final injection of Lupron today.  I will see him back in 9 months for his next PSA check.      Eamon Cano M.D.

## 2022-09-01 NOTE — TELEPHONE ENCOUNTER
My chart message sent     Oumou Laurent RN, BSN  [facility]       self-care/home management/community/leisure

## 2022-09-09 ENCOUNTER — MYC MEDICAL ADVICE (OUTPATIENT)
Dept: FAMILY MEDICINE | Facility: CLINIC | Age: 67
End: 2022-09-09

## 2022-09-09 DIAGNOSIS — Z91.89 RISK OF MYOCARDIAL INFARCTION OR STROKE 7.5% OR GREATER IN NEXT 10 YEARS: ICD-10-CM

## 2022-09-12 RX ORDER — ATORVASTATIN CALCIUM 20 MG/1
20 TABLET, FILM COATED ORAL DAILY
Qty: 90 TABLET | Refills: 1 | Status: SHIPPED | OUTPATIENT
Start: 2022-09-12 | End: 2022-11-28

## 2022-09-12 NOTE — TELEPHONE ENCOUNTER
Prescription approved per [facility] Refill Protocol.    Fernanda Villasenor RN  [facility]

## 2022-09-18 ENCOUNTER — HEALTH MAINTENANCE LETTER (OUTPATIENT)
Age: 67
End: 2022-09-18

## 2022-09-30 ENCOUNTER — MYC MEDICAL ADVICE (OUTPATIENT)
Dept: FAMILY MEDICINE | Facility: CLINIC | Age: 67
End: 2022-09-30

## 2022-09-30 DIAGNOSIS — E78.5 HYPERLIPIDEMIA, UNSPECIFIED HYPERLIPIDEMIA TYPE: Primary | ICD-10-CM

## 2022-09-30 DIAGNOSIS — Z13.1 SCREENING FOR DIABETES MELLITUS: ICD-10-CM

## 2022-10-03 ENCOUNTER — NURSE TRIAGE (OUTPATIENT)
Dept: NURSING | Facility: CLINIC | Age: 67
End: 2022-10-03

## 2022-10-03 NOTE — TELEPHONE ENCOUNTER
Please see my chart message below     Please review and advise     Thank you     Oumou Laurent RN, BSN  [place] [facility]

## 2022-10-03 NOTE — TELEPHONE ENCOUNTER
"Pt reports 3-to-4 days of urinary discomfort.  Burning sensation with urinating.  \"Like tingling.\"    New today:  - Tactile fever.  - Occasional chills.  No temp checked.  More frequent \"hot flashes.\"  Slightly nauseated.    Denies flank pain.  Denies urinary retention.    Pt agrees to same-day clinical eval per triage disposition.  No feasible open appt slots per checking with .  Pt therefore intends to seek UC eval (either with [facility] or [facility]).    Cara [facility] Nurse Advisor     Reason for Disposition    Pain or burning with passing urine (urination) and male    All other males with painful urination, or patient wants to be seen    Additional Information    Negative: Shock suspected (e.g., cold/pale/clammy skin, too weak to stand, low BP, rapid pulse)    Negative: Sounds like a life-threatening emergency to the triager    Negative: Shock suspected (e.g., cold/pale/clammy skin, too weak to stand, low BP, rapid pulse)    Negative: Sounds like a life-threatening emergency to the triager    Negative: Unable to urinate (or only a few drops) and bladder feels very full    Negative: Swollen scrotum    Negative: Pain in scrotum or testicle that persists > 1 hour    Negative: Fever > 100.4 F (38.0 C)    Negative: Vomiting    Negative: Patient sounds very sick or weak to the triager    Negative: SEVERE pain with urination    Negative: Side (flank) or lower back pain present    Negative: Taking antibiotic > 24 hours for UTI and fever persists    Negative: Taking antibiotic > 3 days for UTI and painful urination not improved    Negative: Taking treatment > 3 days for STI (e.g., penile discharge from gonorrhea, chlamydia) and painful urination not improved    Protocols used: URINARY SYMPTOMS-A-OH, URINATION PAIN - MALE-A-OH      "

## 2022-11-25 ASSESSMENT — ENCOUNTER SYMPTOMS
SHORTNESS OF BREATH: 0
HEARTBURN: 1
SORE THROAT: 0
NAUSEA: 0
ABDOMINAL PAIN: 0
CONSTIPATION: 0
DYSURIA: 1
ARTHRALGIAS: 1
JOINT SWELLING: 0
CHILLS: 0
FREQUENCY: 1
PARESTHESIAS: 0
HEADACHES: 0
DIZZINESS: 0
WEAKNESS: 0
HEMATOCHEZIA: 0
HEMATURIA: 0
PALPITATIONS: 0
EYE PAIN: 0
FEVER: 0
MYALGIAS: 0
NERVOUS/ANXIOUS: 0

## 2022-11-25 ASSESSMENT — ACTIVITIES OF DAILY LIVING (ADL): CURRENT_FUNCTION: NO ASSISTANCE NEEDED

## 2022-11-28 ENCOUNTER — OFFICE VISIT (OUTPATIENT)
Dept: FAMILY MEDICINE | Facility: CLINIC | Age: 67
End: 2022-11-28
Payer: MEDICARE

## 2022-11-28 VITALS
DIASTOLIC BLOOD PRESSURE: 72 MMHG | RESPIRATION RATE: 16 BRPM | SYSTOLIC BLOOD PRESSURE: 134 MMHG | HEIGHT: 71 IN | WEIGHT: 203 LBS | OXYGEN SATURATION: 97 % | HEART RATE: 65 BPM | TEMPERATURE: 97.6 F | BODY MASS INDEX: 28.42 KG/M2

## 2022-11-28 DIAGNOSIS — Z91.89 RISK OF MYOCARDIAL INFARCTION OR STROKE 7.5% OR GREATER IN NEXT 10 YEARS: ICD-10-CM

## 2022-11-28 DIAGNOSIS — Z23 NEED FOR PROPHYLACTIC VACCINATION AND INOCULATION AGAINST INFLUENZA: ICD-10-CM

## 2022-11-28 DIAGNOSIS — Z00.00 ENCOUNTER FOR MEDICARE ANNUAL WELLNESS EXAM: Primary | ICD-10-CM

## 2022-11-28 DIAGNOSIS — C61 PROSTATE CANCER (H): ICD-10-CM

## 2022-11-28 DIAGNOSIS — Z12.11 SCREEN FOR COLON CANCER: ICD-10-CM

## 2022-11-28 PROCEDURE — G0008 ADMIN INFLUENZA VIRUS VAC: HCPCS | Performed by: FAMILY MEDICINE

## 2022-11-28 PROCEDURE — G0439 PPPS, SUBSEQ VISIT: HCPCS | Performed by: FAMILY MEDICINE

## 2022-11-28 PROCEDURE — 90662 IIV NO PRSV INCREASED AG IM: CPT | Performed by: FAMILY MEDICINE

## 2022-11-28 RX ORDER — ATORVASTATIN CALCIUM 20 MG/1
20 TABLET, FILM COATED ORAL DAILY
Qty: 90 TABLET | Refills: 3 | Status: SHIPPED | OUTPATIENT
Start: 2022-11-28 | End: 2023-03-16

## 2022-11-28 ASSESSMENT — ENCOUNTER SYMPTOMS
NERVOUS/ANXIOUS: 0
CONSTIPATION: 0
EYE PAIN: 0
ABDOMINAL PAIN: 0
JOINT SWELLING: 0
PARESTHESIAS: 0
SHORTNESS OF BREATH: 0
HEMATURIA: 0
HEARTBURN: 1
CHILLS: 0
FREQUENCY: 1
FEVER: 0
SORE THROAT: 0
DIZZINESS: 0
ARTHRALGIAS: 1
PALPITATIONS: 0
WEAKNESS: 0
MYALGIAS: 0
DYSURIA: 1
HEMATOCHEZIA: 0
NAUSEA: 0
HEADACHES: 0

## 2022-11-28 ASSESSMENT — ACTIVITIES OF DAILY LIVING (ADL): CURRENT_FUNCTION: NO ASSISTANCE NEEDED

## 2022-11-28 NOTE — PROGRESS NOTES
"SUBJECTIVE:   Gisselle is a 67 year old who presents for Preventive Visit.    Patient has been advised of split billing requirements and indicates understanding: Yes  Are you in the first 12 months of your Medicare coverage?  No      Healthy Habits:     In general, how would you rate your overall health?  Excellent    Frequency of exercise:  4-5 days/week    Duration of exercise:  15-30 minutes    Do you usually eat at least 4 servings of fruit and vegetables a day, include whole grains    & fiber and avoid regularly eating high fat or \"junk\" foods?  Yes    Taking medications regularly:  Yes    Medication side effects:  Other    Ability to successfully perform activities of daily living:  No assistance needed    Home Safety:  No safety concerns identified    Hearing Impairment:  Difficulty following a conversation in a noisy restaurant or crowded room, feel that people are mumbling or not speaking clearly, need to ask people to speak up or repeat themselves, find that men's voices are easier to understand than woman's and difficulty understanding soft or whispered speech    In the past 6 months, have you been bothered by leaking of urine? Yes    In general, how would you rate your overall mental or emotional health?  Excellent      PHQ-2 Total Score: 0    Additional concerns today:  No      Have you ever done Advance Care Planning? (For example, a Health Directive, POLST, or a discussion with a medical provider or your loved ones about your wishes): No, advance care planning information given to patient to review.  Patient declined advance care planning discussion at this time.       Fall risk  Fallen 2 or more times in the past year?: No  Any fall with injury in the past year?: No    Cognitive Screening   1) Repeat 3 items (Leader, Season, Table)    2) Clock draw: NORMAL  3) 3 item recall: Recalls 3 objects  Results: 3 items recalled: COGNITIVE IMPAIRMENT LESS LIKELY    Mini-CogTM Copyright S Praneeth. Licensed by the " author for use in [facility]; reprinted with permission (paxtonsakina@[facility]). All rights reserved.      Do you have sleep apnea, excessive snoring or daytime drowsiness?: no    Reviewed and updated as needed this visit by clinical staff   Tobacco  Allergies  Meds              Reviewed and updated as needed this visit by Provider                 Social History     Tobacco Use     Smoking status: Former     Packs/day: 0.00     Years: 1.00     Pack years: 0.00     Types: Cigarettes     Start date: 1962     Quit date: 1965     Years since quittin.2     Smokeless tobacco: Never     Tobacco comments:     I was a 9-?12   Substance Use Topics     Alcohol use: Not Currently         Alcohol Use 2022   Prescreen: >3 drinks/day or >7 drinks/week? No   Prescreen: >3 drinks/day or >7 drinks/week? -         Current providers sharing in care for this patient include:   Patient Care Team:  Twin Holcomb DO as PCP - General (Family Practice)  Eamon Cano MD as MD (Urology)  Twin Holcomb DO as Assigned PCP  Adele Garza PA-C as Assigned Surgical Provider    The following health maintenance items are reviewed in Epic and correct as of today:  Health Maintenance   Topic Date Due     COLORECTAL CANCER SCREENING  Never done     ZOSTER IMMUNIZATION (1 of 2) Never done     COVID-19 Vaccine (4 - Booster for Pfizer series) 2021     INFLUENZA VACCINE (1) 2022     MEDICARE ANNUAL WELLNESS VISIT  10/11/2022     ANNUAL REVIEW OF HM ORDERS  2023     FALL RISK ASSESSMENT  2023     LIPID  10/25/2026     ADVANCE CARE PLANNING  2027     DTAP/TDAP/TD IMMUNIZATION (2 - Td or Tdap) 2029     HEPATITIS C SCREENING  Completed     PHQ-2 (once per calendar year)  Completed     Pneumococcal Vaccine: 65+ Years  Completed     AORTIC ANEURYSM SCREENING (SYSTEM ASSIGNED)  Completed     IPV IMMUNIZATION  Aged Out     MENINGITIS IMMUNIZATION  Aged Out     Lab work is in  "process      Review of Systems   Constitutional: Negative for chills and fever.   HENT: Positive for hearing loss. Negative for congestion, ear pain and sore throat.    Eyes: Negative for pain and visual disturbance.   Respiratory: Negative for shortness of breath.    Cardiovascular: Negative for chest pain, palpitations and peripheral edema.   Gastrointestinal: Positive for heartburn. Negative for abdominal pain, constipation, hematochezia and nausea.   Genitourinary: Positive for dysuria, frequency, genital sores, impotence and urgency. Negative for hematuria and penile discharge.   Musculoskeletal: Positive for arthralgias. Negative for joint swelling and myalgias.   Skin: Negative for rash.   Neurological: Negative for dizziness, weakness, headaches and paresthesias.   Psychiatric/Behavioral: Negative for mood changes. The patient is not nervous/anxious.        OBJECTIVE:   /72   Pulse 65   Temp 97.6  F (36.4  C) (Tympanic)   Resp 16   Ht 1.81 m (5' 11.25\")   Wt 92.1 kg (203 lb)   SpO2 97%   BMI 28.11 kg/m   Estimated body mass index is 28.11 kg/m  as calculated from the following:    Height as of this encounter: 1.81 m (5' 11.25\").    Weight as of this encounter: 92.1 kg (203 lb).  Physical Exam  GENERAL: healthy, alert and no distress  EYES: Eyes grossly normal to inspection  NECK: no adenopathy, no asymmetry, masses, or scars and thyroid normal to palpation  RESP: lungs clear to auscultation - no rales, rhonchi or wheezes  CV: regular rate and rhythm, normal S1 S2, no S3 or S4, no murmur, click or rub, no peripheral edema and peripheral pulses strong  ABDOMEN: soft, nontender, no hepatosplenomegaly, no masses and bowel sounds normal  MS: no gross musculoskeletal defects noted, no edema  PSYCH: mentation appears normal, affect normal/bright    Diagnostic Test Results:  Labs reviewed in Epic    ASSESSMENT / PLAN:       ICD-10-CM    1. Encounter for Medicare annual wellness exam  Z00.00       2. " "Screen for colon cancer  Z12.11 Colonoscopy Screening  Referral      3. Risk of myocardial infarction or stroke 7.5% or greater in next 10 years  Z91.89 atorvastatin (LIPITOR) 20 MG tablet      4. Prostate cancer (H)  C61       5. Need for prophylactic vaccination and inoculation against influenza  Z23 INFLUENZA, QUAD, HIGH DOSE, PF, 65YR + (FLUZONE HD)          Patient has been advised of split billing requirements and indicates understanding: Yes      COUNSELING:  Reviewed preventive health counseling, as reflected in patient instructions      BMI:   Estimated body mass index is 28.11 kg/m  as calculated from the following:    Height as of this encounter: 1.81 m (5' 11.25\").    Weight as of this encounter: 92.1 kg (203 lb).         He reports that he quit smoking about 57 years ago. His smoking use included cigarettes. He started smoking about 60 years ago. He has never used smokeless tobacco.      Appropriate preventive services were discussed with this patient, including applicable screening as appropriate for cardiovascular disease, diabetes, osteopenia/osteoporosis, and glaucoma.  As appropriate for age/gender, discussed screening for colorectal cancer, prostate cancer, breast cancer, and cervical cancer. Checklist reviewing preventive services available has been given to the patient.    Reviewed patients plan of care and provided an AVS. The Basic Care Plan (routine screening as documented in Health Maintenance) for Joseph meets the Care Plan requirement. This Care Plan has been established and reviewed with the Patient.      Twin Holcomb DO  [facility] [place]    Identified Health Risks:  "

## 2022-11-28 NOTE — PATIENT INSTRUCTIONS
Patient Education   Personalized Prevention Plan  You are due for the preventive services outlined below.  Your care team is available to assist you in scheduling these services.  If you have already completed any of these items, please share that information with your care team to update in your medical record.  Health Maintenance Due   Topic Date Due     Colorectal Cancer Screening  Never done     Zoster (Shingles) Vaccine (1 of 2) Never done     COVID-19 Vaccine (4 - Booster for Pfizer series) 12/06/2021     Flu Vaccine (1) 09/01/2022     Annual Wellness Visit  10/11/2022

## 2022-12-02 ENCOUNTER — LAB (OUTPATIENT)
Dept: LAB | Facility: CLINIC | Age: 67
End: 2022-12-02
Payer: MEDICARE

## 2022-12-02 DIAGNOSIS — Z13.1 SCREENING FOR DIABETES MELLITUS: ICD-10-CM

## 2022-12-02 DIAGNOSIS — E78.5 HYPERLIPIDEMIA, UNSPECIFIED HYPERLIPIDEMIA TYPE: ICD-10-CM

## 2022-12-02 DIAGNOSIS — C61 PROSTATE CANCER (H): ICD-10-CM

## 2022-12-02 LAB
ALBUMIN SERPL BCG-MCNC: 4.2 G/DL (ref 3.5–5.2)
ALP SERPL-CCNC: 102 U/L (ref 40–129)
ALT SERPL W P-5'-P-CCNC: 52 U/L (ref 10–50)
ANION GAP SERPL CALCULATED.3IONS-SCNC: 10 MMOL/L (ref 7–15)
AST SERPL W P-5'-P-CCNC: 36 U/L (ref 10–50)
BILIRUB SERPL-MCNC: 0.4 MG/DL
BUN SERPL-MCNC: 18.9 MG/DL (ref 8–23)
CALCIUM SERPL-MCNC: 9.3 MG/DL (ref 8.8–10.2)
CHLORIDE SERPL-SCNC: 106 MMOL/L (ref 98–107)
CHOLEST SERPL-MCNC: 130 MG/DL
CREAT SERPL-MCNC: 1.02 MG/DL (ref 0.67–1.17)
DEPRECATED HCO3 PLAS-SCNC: 25 MMOL/L (ref 22–29)
GFR SERPL CREATININE-BSD FRML MDRD: 80 ML/MIN/1.73M2
GLUCOSE SERPL-MCNC: 105 MG/DL (ref 70–99)
HDLC SERPL-MCNC: 37 MG/DL
LDLC SERPL CALC-MCNC: 56 MG/DL
NONHDLC SERPL-MCNC: 93 MG/DL
POTASSIUM SERPL-SCNC: 4.3 MMOL/L (ref 3.4–5.3)
PROT SERPL-MCNC: 7.8 G/DL (ref 6.4–8.3)
PSA SERPL-MCNC: <0.01 NG/ML (ref 0–4.5)
SODIUM SERPL-SCNC: 141 MMOL/L (ref 136–145)
TRIGL SERPL-MCNC: 186 MG/DL

## 2022-12-02 PROCEDURE — 80053 COMPREHEN METABOLIC PANEL: CPT

## 2022-12-02 PROCEDURE — 36415 COLL VENOUS BLD VENIPUNCTURE: CPT

## 2022-12-02 PROCEDURE — 84153 ASSAY OF PSA TOTAL: CPT

## 2022-12-02 PROCEDURE — 80061 LIPID PANEL: CPT

## 2022-12-22 ENCOUNTER — TELEPHONE (OUTPATIENT)
Dept: GASTROENTEROLOGY | Facility: CLINIC | Age: 67
End: 2022-12-22

## 2022-12-22 NOTE — TELEPHONE ENCOUNTER
Screening Questions  BLUE  KIND OF PREP RED  LOCATION [review exclusion criteria] GREEN  SEDATION TYPE        y Are you active on mychart?       Aicha Ordering/Referring Provider?        Medicare What type of coverage do you have?      y Have you had a positive covid test in the last 14 days?     27.9 1. BMI  [BMI 40+ - review exclusion criteria]    y  2. Are you able to give consent for your medical care? [IF NO,RN REVIEW]        n  3. Are you taking any prescription pain medications on a routine schedule?      n  3a. EXTENDED PREP What kind of prescription?     n 4. Do you have any chemical dependencies such as alcohol, street drugs, or methadone?    n 5. Do you have any history of post-traumatic stress syndrome, severe anxiety or history of psychosis?      **If yes 3- 5 , please schedule with MAC sedation.**          IF YES TO ANY 6 - 10 - HOSPITAL SETTING ONLY.     n 6.   Do you need assistance transferring?     n 7.   Have you had a heart or lung transplant?    n 8.   Are you currently on dialysis?   n 9.   Do you use daily home oxygen?   n 10. Do you take nitroglycerin?   10a. n If yes, how often?     11. [FEMALES]  n Are you currently pregnant?    11a. n If yes, how many weeks? [ Greater than 12 weeks, OR NEEDED]    n 12. Do you have Pulmonary Hypertension? *NEED PAC APPT AT [facility]*     n 13. [review exclusion criteria]  Do you have any implantable devices in your body (pacemaker, defib, LVAD)?    n 14. In the past 6 months, have you had any heart related issues including cardiomyopathy or heart attack?     14a. n If yes, did it require cardiac stenting if so when?     n 15. Have you had a stroke or Transient ischemic attack (TIA - aka  mini stroke ) within 6 months?      n 16. Do you have mod to severe Obstructive Sleep Apnea?  [Hospital only - Ok at [facility]]    n 17. Do you have SEVERE AND UNCONTROLLED asthma? *NEED PAC APPT AT [facility]*     n 18. Are you currently taking any blood thinners?     18a.  "If yes, inform patient to \"follow up w/ ordering provider for bridging instructions.\"    n 19. Do you take the medication Phentermine?    19a. If yes, \"Hold for 7 days before procedure.  Please consult your prescribing provider if you have questions about holding this medication.\"     n  20. Do you have chronic kidney disease?      n  21. Do you have a diagnosis of diabetes?     n  22. On a regular basis do you go 3-5 days between bowel movements?      23. Preferred LOCAL Pharmacy for Pre Prescription    [ LIST ONLY ONE PHARMACY]     [facility] [address] [facility] [address]        - CLOSING REMINDERS -    Informed patient they will need an adult    Cannot take any type of public or medical transportation alone    Conscious Sedation- Needs  for 6 hours after the procedure       MAC/General-Needs  for 24 hours after procedure    Pre-Procedure Covid test to be completed [[facility] PCR Testing Required]    Confirmed Nurse will call to complete assessment       - SCHEDULING DETAILS -  n Hospital Setting Required? If yes, what is the exclusion?:    Syl  Surgeon    3/23/23  Date of Procedure  Lower Endoscopy [Colonoscopy]  Type of Procedure Scheduled  [facility] Location   [facility]-If you answer yes to questions #8, #20, #21Which Colonoscopy Prep was Sent?     CS Sedation Type     n PAC / Pre-op Required                 "

## 2023-01-13 DIAGNOSIS — C61 PROSTATE CANCER (H): Primary | ICD-10-CM

## 2023-01-30 ENCOUNTER — MYC MEDICAL ADVICE (OUTPATIENT)
Dept: FAMILY MEDICINE | Facility: CLINIC | Age: 68
End: 2023-01-30
Payer: MEDICARE

## 2023-03-04 RX ORDER — BISACODYL 5 MG
TABLET, DELAYED RELEASE (ENTERIC COATED) ORAL
Qty: 4 TABLET | Refills: 0 | Status: SHIPPED | OUTPATIENT
Start: 2023-03-04 | End: 2024-01-03

## 2023-03-08 ENCOUNTER — OFFICE VISIT (OUTPATIENT)
Dept: FAMILY MEDICINE | Facility: CLINIC | Age: 68
End: 2023-03-08
Payer: MEDICARE

## 2023-03-08 VITALS
SYSTOLIC BLOOD PRESSURE: 122 MMHG | RESPIRATION RATE: 14 BRPM | DIASTOLIC BLOOD PRESSURE: 80 MMHG | WEIGHT: 203 LBS | BODY MASS INDEX: 28.42 KG/M2 | TEMPERATURE: 96.5 F | HEIGHT: 71 IN | HEART RATE: 70 BPM | OXYGEN SATURATION: 99 %

## 2023-03-08 DIAGNOSIS — C61 PROSTATE CANCER (H): ICD-10-CM

## 2023-03-08 DIAGNOSIS — L84 CORN OR CALLUS: Primary | ICD-10-CM

## 2023-03-08 DIAGNOSIS — R07.89 RIGHT-SIDED CHEST WALL PAIN: ICD-10-CM

## 2023-03-08 PROCEDURE — 99214 OFFICE O/P EST MOD 30 MIN: CPT | Performed by: FAMILY MEDICINE

## 2023-03-08 NOTE — PROGRESS NOTES
"  Assessment & Plan     Corn or callus  Corn pared down with 15 blade. Reviewed home cares including soaking, using pumice stone, file to help remove callus, lotions, over the counter pads, footwear change. If any persistence or worsening, could follow up with podiatry.    Right-sided chest wall pain  Ongoing after fall ~2 weeks ago. Shoulder with normal range of motion. Suspect contusion or muscle strain. Discussed CXR to evaluate for rib fracture but ultimately declined. Ensure taking full deep breaths to prevent atelectasis. Continue PRN over the counter pain medication/antiinflammatories.    Prostate cancer (H)  Follows with urology.               BMI:   Estimated body mass index is 28.11 kg/m  as calculated from the following:    Height as of this encounter: 1.81 m (5' 11.25\").    Weight as of this encounter: 92.1 kg (203 lb).     Return in about 1 month (around 4/8/2023) for follow up if symptoms not improving.    Twin Holcomb DO  [facility] [place]    Subjective   Deltona is a 67 year old presenting for the following health issues:  Derm Problem      History of Present Illness       Reason for visit:  To remove a planter's wart on my right foot.    He eats 4 or more servings of fruits and vegetables daily.He consumes 0 sweetened beverage(s) daily.He exercises with enough effort to increase his heart rate 20 to 29 minutes per day.  He exercises with enough effort to increase his heart rate 4 days per week.   He is taking medications regularly.     Right shoulder pain and rib pain patient fell last week- still having pain. Unsure how he fell. Symptoms have improved a bit but still    Review of Systems   Constitutional, HEENT, cardiovascular, pulmonary, gi and gu systems are negative, except as otherwise noted.      Objective    /80   Pulse 70   Temp (!) 96.5  F (35.8  C) (Tympanic)   Resp 14   Ht 1.81 m (5' 11.25\")   Wt 92.1 kg (203 lb)   SpO2 99%   BMI 28.11 kg/m    Body mass " index is 28.11 kg/m .  Physical Exam   GENERAL: healthy, alert and no distress  MS: extremities normal- no gross deformities noted; right shoulder with full range of motion; negative neers/mendoza, empty can, speeds test, cross arm adduction. He does have some tenderness across right side of anterior chest wall.  SKIN: corn on bottom of right foot

## 2023-03-23 ENCOUNTER — HOSPITAL ENCOUNTER (OUTPATIENT)
Facility: CLINIC | Age: 68
Discharge: HOME | End: 2023-03-23
Attending: INTERNAL MEDICINE | Admitting: INTERNAL MEDICINE
Payer: MEDICARE

## 2023-03-23 VITALS
HEART RATE: 63 BPM | RESPIRATION RATE: 88 BRPM | TEMPERATURE: 96.6 F | DIASTOLIC BLOOD PRESSURE: 102 MMHG | OXYGEN SATURATION: 97 % | SYSTOLIC BLOOD PRESSURE: 164 MMHG

## 2023-03-23 DIAGNOSIS — Z12.11 SPECIAL SCREENING FOR MALIGNANT NEOPLASMS, COLON: Primary | ICD-10-CM

## 2023-03-23 LAB — COLONOSCOPY: NORMAL

## 2023-03-23 PROCEDURE — 250N000011 HC RX IP 250 OP 636: Performed by: INTERNAL MEDICINE

## 2023-03-23 PROCEDURE — 45378 DIAGNOSTIC COLONOSCOPY: CPT | Performed by: INTERNAL MEDICINE

## 2023-03-23 PROCEDURE — G0121 COLON CA SCRN NOT HI RSK IND: HCPCS | Performed by: INTERNAL MEDICINE

## 2023-03-23 PROCEDURE — G0500 MOD SEDAT ENDO SERVICE >5YRS: HCPCS | Performed by: INTERNAL MEDICINE

## 2023-03-23 RX ORDER — ATROPINE SULFATE 0.1 MG/ML
1 INJECTION INTRAVENOUS
Status: DISCONTINUED | OUTPATIENT
Start: 2023-03-23 | End: 2023-03-23 | Stop reason: HOSPADM

## 2023-03-23 RX ORDER — DIPHENHYDRAMINE HYDROCHLORIDE 50 MG/ML
25-50 INJECTION INTRAMUSCULAR; INTRAVENOUS
Status: DISCONTINUED | OUTPATIENT
Start: 2023-03-23 | End: 2023-03-23 | Stop reason: HOSPADM

## 2023-03-23 RX ORDER — FLUMAZENIL 0.1 MG/ML
0.2 INJECTION, SOLUTION INTRAVENOUS
Status: DISCONTINUED | OUTPATIENT
Start: 2023-03-23 | End: 2023-03-23 | Stop reason: HOSPADM

## 2023-03-23 RX ORDER — ONDANSETRON 4 MG/1
4 TABLET, ORALLY DISINTEGRATING ORAL EVERY 6 HOURS PRN
Status: DISCONTINUED | OUTPATIENT
Start: 2023-03-23 | End: 2023-03-23 | Stop reason: HOSPADM

## 2023-03-23 RX ORDER — SIMETHICONE 40MG/0.6ML
133 SUSPENSION, DROPS(FINAL DOSAGE FORM)(ML) ORAL
Status: DISCONTINUED | OUTPATIENT
Start: 2023-03-23 | End: 2023-03-23 | Stop reason: HOSPADM

## 2023-03-23 RX ORDER — PROCHLORPERAZINE MALEATE 5 MG
5 TABLET ORAL EVERY 6 HOURS PRN
Status: DISCONTINUED | OUTPATIENT
Start: 2023-03-23 | End: 2023-03-23 | Stop reason: HOSPADM

## 2023-03-23 RX ORDER — NALOXONE HYDROCHLORIDE 0.4 MG/ML
0.4 INJECTION, SOLUTION INTRAMUSCULAR; INTRAVENOUS; SUBCUTANEOUS
Status: DISCONTINUED | OUTPATIENT
Start: 2023-03-23 | End: 2023-03-23 | Stop reason: HOSPADM

## 2023-03-23 RX ORDER — EPINEPHRINE 1 MG/ML
0.1 INJECTION, SOLUTION INTRAMUSCULAR; SUBCUTANEOUS
Status: DISCONTINUED | OUTPATIENT
Start: 2023-03-23 | End: 2023-03-23 | Stop reason: HOSPADM

## 2023-03-23 RX ORDER — NALOXONE HYDROCHLORIDE 0.4 MG/ML
0.2 INJECTION, SOLUTION INTRAMUSCULAR; INTRAVENOUS; SUBCUTANEOUS
Status: DISCONTINUED | OUTPATIENT
Start: 2023-03-23 | End: 2023-03-23 | Stop reason: HOSPADM

## 2023-03-23 RX ORDER — ONDANSETRON 2 MG/ML
4 INJECTION INTRAMUSCULAR; INTRAVENOUS EVERY 6 HOURS PRN
Status: DISCONTINUED | OUTPATIENT
Start: 2023-03-23 | End: 2023-03-23 | Stop reason: HOSPADM

## 2023-03-23 RX ORDER — LIDOCAINE 40 MG/G
CREAM TOPICAL
Status: DISCONTINUED | OUTPATIENT
Start: 2023-03-23 | End: 2023-03-23 | Stop reason: HOSPADM

## 2023-03-23 RX ORDER — ONDANSETRON 2 MG/ML
4 INJECTION INTRAMUSCULAR; INTRAVENOUS
Status: DISCONTINUED | OUTPATIENT
Start: 2023-03-23 | End: 2023-03-23 | Stop reason: HOSPADM

## 2023-03-23 RX ORDER — FENTANYL CITRATE 50 UG/ML
50-100 INJECTION, SOLUTION INTRAMUSCULAR; INTRAVENOUS EVERY 5 MIN PRN
Status: DISCONTINUED | OUTPATIENT
Start: 2023-03-23 | End: 2023-03-23 | Stop reason: HOSPADM

## 2023-03-23 RX ADMIN — FENTANYL CITRATE 50 MCG: 0.05 INJECTION, SOLUTION INTRAMUSCULAR; INTRAVENOUS at 07:34

## 2023-03-23 RX ADMIN — FENTANYL CITRATE 50 MCG: 0.05 INJECTION, SOLUTION INTRAMUSCULAR; INTRAVENOUS at 07:29

## 2023-03-23 RX ADMIN — MIDAZOLAM 1 MG: 1 INJECTION INTRAMUSCULAR; INTRAVENOUS at 07:29

## 2023-03-23 RX ADMIN — MIDAZOLAM 1 MG: 1 INJECTION INTRAMUSCULAR; INTRAVENOUS at 07:34

## 2023-03-23 ASSESSMENT — ACTIVITIES OF DAILY LIVING (ADL): ADLS_ACUITY_SCORE: 35

## 2023-03-23 NOTE — H&P
Pre-Endoscopy History and Physical     Joseph Grissom MRN# [id number]   YOB: 1955 Age: 67 year old     Date of Procedure: 3/23/2023  Primary care provider: Twin Holcomb  Type of Endoscopy: Colonoscopy with possible biopsy, possible polypectomy  Reason for Procedure: screen  Type of Anesthesia Anticipated: Conscious Sedation    HPI:    Joseph is a 67 year old male who will be undergoing the above procedure.      A history and physical has been performed. The patient's medications and allergies have been reviewed. The risks and benefits of the procedure and the sedation options and risks were discussed with the patient.  All questions were answered and informed consent was obtained.      He denies a personal or family history of anesthesia complications or bleeding disorders.     Patient Active Problem List   Diagnosis     Prostate cancer (H)        Past Medical History:   Diagnosis Date     Hyperlipidemia      Mumps         Past Surgical History:   Procedure Laterality Date     APPENDECTOMY       FACIAL RECONSTRUCTION SURGERY      had cheek bone fixed after trauma     HERNIA REPAIR       VASECTOMY       VASECTOMY       vasectomy reversal       VASOVASOSTOMY         Social History     Tobacco Use     Smoking status: Former     Packs/day: 0.00     Years: 1.00     Pack years: 0.00     Types: Cigarettes     Start date: 1962     Quit date: 1965     Years since quittin.5     Smokeless tobacco: Never     Tobacco comments:     I was a 9-?12   Substance Use Topics     Alcohol use: Yes     Comment: occ       Family History   Problem Relation Age of Onset     Cancer Brother         exposed to agent orange     Hypertension Mother      Hypertension Father      Diabetes Father      Skin Cancer Father      Alzheimer Disease Father      Diabetes Brother      Hypertension Brother      Substance Abuse Brother      Obesity Brother        Prior to Admission medications    Medication Sig Start Date  "End Date Taking? Authorizing Provider   atorvastatin (LIPITOR) 20 MG tablet TAKE 1 TABLET EVERY DAY 3/16/23  Yes Twin Holcomb,    bisacodyl (DULCOLAX) 5 MG EC tablet Take 2 tablets at 3 pm the day before your procedure. If your procedure is before 11 am, take 2 additional tablets at 11 pm. If your procedure is after 11 am, take 2 additional tablets at 6 am. For additional instructions refer to your colonoscopy prep instructions. 3/4/23  Yes Gavin Streeter MD   Black Cohosh 80 MG CAPS    Yes Reported, Patient   Diphenhydramine-APAP 12.5-500 MG TABS  9/9/00  Yes Reported, Patient   fexofenadine (ALLEGRA) 180 MG tablet Take 180 mg by mouth daily   Yes Reported, Patient   Multiple Vitamins-Minerals (MULTIVITAMIN ADULT PO)    Yes Reported, Patient   naphazoline-pheniramine (OPCON-A/VISINE A/NAPHCON A) SOLN ophthalmic solution 1-2 drops 4 times daily as needed for irritation   Yes Reported, Patient   pantothenic acid 500 MG TABS Take 500 mg by mouth   Yes Reported, Patient   polyethylene glycol (GOLYTELY) 236 g suspension The night before the exam at 6 pm drink an 8-ounce glass every 15 minutes until the jug is half empty. If you arrive before 11 AM: Drink the other half of the Golytely jug at 11 PM night before procedure. If you arrive after 11 AM: Drink the other half of the Golytely jug at 6 AM day of procedure. For additional instructions refer to your colonoscopy prep instructions. 3/4/23  Yes Gavin Streeter MD   Triamcinolone Acetonide (NASACORT ALLERGY 24HR NA)    Yes Reported, Patient       Allergies   Allergen Reactions     Eggs [Chicken-Derived Products (Egg)]      Penicillins         REVIEW OF SYSTEMS:   5 point ROS negative except as noted above in HPI, including Gen., Resp., CV, GI &  system review.    PHYSICAL EXAM:   There were no vitals taken for this visit. Estimated body mass index is 28.11 kg/m  as calculated from the following:    Height as of 3/8/23: 1.81 m (5' 11.25\").    Weight as of " 3/8/23: 92.1 kg (203 lb).   GENERAL APPEARANCE: alert, and oriented  MENTAL STATUS: alert  AIRWAY EXAM: Mallampatti Class I (visualization of the soft palate, fauces, uvula, anterior and posterior pillars)  RESP: lungs clear to auscultation - no rales, rhonchi or wheezes  CV: regular rates and rhythm  DIAGNOSTICS:    Not indicated    IMPRESSION   ASA Class 2 - Mild systemic disease    PLAN:   Plan for Colonoscopy with possible biopsy, possible polypectomy. We discussed the risks, benefits and alternatives and the patient wished to proceed.    The above has been forwarded to the consulting provider.      Signed Electronically by: Gavin Streeter MD  March 23, 2023

## 2023-06-30 ENCOUNTER — LAB (OUTPATIENT)
Dept: LAB | Facility: CLINIC | Age: 68
End: 2023-06-30
Payer: MEDICARE

## 2023-06-30 DIAGNOSIS — C61 PROSTATE CANCER (H): ICD-10-CM

## 2023-06-30 LAB — PSA SERPL DL<=0.01 NG/ML-MCNC: 0.04 NG/ML (ref 0–4.5)

## 2023-06-30 PROCEDURE — 84153 ASSAY OF PSA TOTAL: CPT

## 2023-06-30 PROCEDURE — 36415 COLL VENOUS BLD VENIPUNCTURE: CPT

## 2023-07-10 ENCOUNTER — OFFICE VISIT (OUTPATIENT)
Dept: UROLOGY | Facility: CLINIC | Age: 68
End: 2023-07-10
Payer: MEDICARE

## 2023-07-10 VITALS
SYSTOLIC BLOOD PRESSURE: 153 MMHG | BODY MASS INDEX: 27.44 KG/M2 | DIASTOLIC BLOOD PRESSURE: 88 MMHG | HEIGHT: 71 IN | WEIGHT: 196 LBS | HEART RATE: 57 BPM

## 2023-07-10 DIAGNOSIS — C61 PROSTATE CANCER (H): Primary | ICD-10-CM

## 2023-07-10 PROCEDURE — 99213 OFFICE O/P EST LOW 20 MIN: CPT | Performed by: UROLOGY

## 2023-07-10 ASSESSMENT — PAIN SCALES - GENERAL: PAINLEVEL: NO PAIN (0)

## 2023-07-10 NOTE — LETTER
7/10/2023       RE: Joseph Grissom  [address]  [place] [address]     Dear Colleague,    Thank you for referring your patient, Joseph Grissom, to the [facility] [place] [facility]. Please see a copy of my visit note below.    Office Visit Note  [facility]  [phone]    UROLOGIC DIAGNOSES:   High risk prostate cancer  Erectile dysfunction    CURRENT INTERVENTIONS:   Radiotherapy +3 years hormonal therapy, completed 2022    HISTORY:   Gisselle returns to clinic today for prostate cancer follow-up.  He received his final Lupron injection 1 year ago.  His PSA is now minimally detectable at 0.04.  He continues to have occasional hot flashes but he says they are abating somewhat.  He reports no major urinary symptoms or complaints at this time.      PAST MEDICAL HISTORY:   Past Medical History:   Diagnosis Date    Hyperlipidemia     Mumps        PAST SURGICAL HISTORY:   Past Surgical History:   Procedure Laterality Date    APPENDECTOMY      COLONOSCOPY N/A 3/23/2023    Procedure: COLONOSCOPY;  Surgeon: Gavin Streeter MD;  Location:  GI    FACIAL RECONSTRUCTION SURGERY      had cheek bone fixed after trauma    HERNIA REPAIR      VASECTOMY      VASECTOMY      vasectomy reversal      VASOVASOSTOMY         FAMILY HISTORY:   Family History   Problem Relation Age of Onset    Hypertension Mother     Hypertension Father     Diabetes Father     Skin Cancer Father     Alzheimer Disease Father     Cancer Brother         exposed to agent orange    Diabetes Brother     Hypertension Brother     Substance Abuse Brother     Obesity Brother     Colon Cancer No family hx of        SOCIAL HISTORY:   Social History     Socioeconomic History    Marital status:      Spouse name: None    Number of children: None    Years of education: None    Highest education level: None   Tobacco Use    Smoking status: Former      "Packs/day: 0.00     Years: 1.00     Pack years: 0.00     Types: Cigarettes     Start date: 1962     Quit date: 1965     Years since quittin.8    Smokeless tobacco: Never    Tobacco comments:     I was a 9-?12   Vaping Use    Vaping Use: Never used   Substance and Sexual Activity    Alcohol use: Yes     Comment: occ    Drug use: Not Currently    Sexual activity: Yes     Partners: Female     Birth control/protection: Post-menopausal   Other Topics Concern    Parent/sibling w/ CABG, MI or angioplasty before 65F 55M? No       Review Of Systems:  Skin: No rash, pruritis, or skin pigmentation  Eyes: No changes in vision  Ears/Nose/Throat: No changes in hearing, no nosebleeds  Respiratory: No shortness of breath, dyspnea on exertion, cough, or hemoptysis  Cardiovascular: No chest pain or palpitations  Gastrointestinal: No diarrhea or constipation. No abdominal pain. No hematochezia  Genitourinary: see HPI  Musculoskeletal: No pain or swelling of joints, normal range of motion  Neurologic: No weakness or tremors  Psychiatric: No recent changes in memory or mood  Hematologic/Lymphatic/Immunologic: No easy bruising or enlarged lymph nodes  Endocrine: No weight gain or loss      PHYSICAL EXAM:    BP (!) 153/88   Pulse 57   Ht 1.81 m (5' 11.25\")   Wt 88.9 kg (196 lb)   BMI 27.14 kg/m      Constitutional: Well developed. Conversant and in no acute distress  Eyes: Anicteric sclera, conjunctiva clear, normal extraocular movements  ENT: Normocephalic and atraumatic,   Skin: Warm and dry. No rashes or lesions  Cardiac: No peripheral edema  Back/Flank: Not done  CNS/PNS: Normal musculature and movements, moves all extremities normally  Respiratory: Normal non-labored breathing  Abdomen: Soft nontender and nondistended  Peripheral Vascular: No peripheral edema  Mental Status/Psych: Alert and Oriented x 3. Normal mood and affect    Penis: Not done  Scrotal Skin: Not done  Testicles: Not done  Epididymis: Not " done  Digital Rectal Exam:     Cystoscopy: Not done    Imaging: None    Urinalysis: UA RESULTS:  Recent Labs   Lab Test 10/11/21  1522   COLOR Yellow   APPEARANCE Clear   URINEGLC Negative   URINEBILI Negative   URINEKETONE Negative   SG 1.025   UBLD Trace*   URINEPH 7.0   PROTEIN Negative   UROBILINOGEN 0.2   NITRITE Negative   LEUKEST Negative   RBCU 0-2   WBCU 0-5       PSA: 0.04    Post Void Residual:     Other labs: None today      IMPRESSION:  Doing well    PLAN:  He is doing well from a prostate cancer standpoint.  The PSA has become minimally detectable now that his Lupron has begun wearing off.  I recommend that we follow the PSA closely.  I will see him back in 6 months for his next PSA check.      Eamon Cano M.D.

## 2023-07-10 NOTE — PROGRESS NOTES
Office Visit Note  [facility]  [phone]    UROLOGIC DIAGNOSES:   High risk prostate cancer  Erectile dysfunction    CURRENT INTERVENTIONS:   Radiotherapy +3 years hormonal therapy, completed     HISTORY:   Gisselle returns to clinic today for prostate cancer follow-up.  He received his final Lupron injection 1 year ago.  His PSA is now minimally detectable at 0.04.  He continues to have occasional hot flashes but he says they are abating somewhat.  He reports no major urinary symptoms or complaints at this time.      PAST MEDICAL HISTORY:   Past Medical History:   Diagnosis Date     Hyperlipidemia      Mumps        PAST SURGICAL HISTORY:   Past Surgical History:   Procedure Laterality Date     APPENDECTOMY       COLONOSCOPY N/A 3/23/2023    Procedure: COLONOSCOPY;  Surgeon: Gavin Streeter MD;  Location:  GI     FACIAL RECONSTRUCTION SURGERY      had cheek bone fixed after trauma     HERNIA REPAIR       VASECTOMY       VASECTOMY       vasectomy reversal       VASOVASOSTOMY         FAMILY HISTORY:   Family History   Problem Relation Age of Onset     Hypertension Mother      Hypertension Father      Diabetes Father      Skin Cancer Father      Alzheimer Disease Father      Cancer Brother         exposed to agent orange     Diabetes Brother      Hypertension Brother      Substance Abuse Brother      Obesity Brother      Colon Cancer No family hx of        SOCIAL HISTORY:   Social History     Socioeconomic History     Marital status:      Spouse name: None     Number of children: None     Years of education: None     Highest education level: None   Tobacco Use     Smoking status: Former     Packs/day: 0.00     Years: 1.00     Pack years: 0.00     Types: Cigarettes     Start date: 1962     Quit date: 1965     Years since quittin.8     Smokeless tobacco: Never     Tobacco comments:     I was a 9-?12   Vaping Use     Vaping Use: Never used   Substance and Sexual Activity      "Alcohol use: Yes     Comment: occ     Drug use: Not Currently     Sexual activity: Yes     Partners: Female     Birth control/protection: Post-menopausal   Other Topics Concern     Parent/sibling w/ CABG, MI or angioplasty before 65F 55M? No       Review Of Systems:  Skin: No rash, pruritis, or skin pigmentation  Eyes: No changes in vision  Ears/Nose/Throat: No changes in hearing, no nosebleeds  Respiratory: No shortness of breath, dyspnea on exertion, cough, or hemoptysis  Cardiovascular: No chest pain or palpitations  Gastrointestinal: No diarrhea or constipation. No abdominal pain. No hematochezia  Genitourinary: see HPI  Musculoskeletal: No pain or swelling of joints, normal range of motion  Neurologic: No weakness or tremors  Psychiatric: No recent changes in memory or mood  Hematologic/Lymphatic/Immunologic: No easy bruising or enlarged lymph nodes  Endocrine: No weight gain or loss      PHYSICAL EXAM:    BP (!) 153/88   Pulse 57   Ht 1.81 m (5' 11.25\")   Wt 88.9 kg (196 lb)   BMI 27.14 kg/m      Constitutional: Well developed. Conversant and in no acute distress  Eyes: Anicteric sclera, conjunctiva clear, normal extraocular movements  ENT: Normocephalic and atraumatic,   Skin: Warm and dry. No rashes or lesions  Cardiac: No peripheral edema  Back/Flank: Not done  CNS/PNS: Normal musculature and movements, moves all extremities normally  Respiratory: Normal non-labored breathing  Abdomen: Soft nontender and nondistended  Peripheral Vascular: No peripheral edema  Mental Status/Psych: Alert and Oriented x 3. Normal mood and affect    Penis: Not done  Scrotal Skin: Not done  Testicles: Not done  Epididymis: Not done  Digital Rectal Exam:     Cystoscopy: Not done    Imaging: None    Urinalysis: UA RESULTS:  Recent Labs   Lab Test 10/11/21  1522   COLOR Yellow   APPEARANCE Clear   URINEGLC Negative   URINEBILI Negative   URINEKETONE Negative   SG 1.025   UBLD Trace*   URINEPH 7.0   PROTEIN Negative "   UROBILINOGEN 0.2   NITRITE Negative   LEUKEST Negative   RBCU 0-2   WBCU 0-5       PSA: 0.04    Post Void Residual:     Other labs: None today      IMPRESSION:  Doing well    PLAN:  He is doing well from a prostate cancer standpoint.  The PSA has become minimally detectable now that his Lupron has begun wearing off.  I recommend that we follow the PSA closely.  I will see him back in 6 months for his next PSA check.      Eamon Cano M.D.

## 2023-07-10 NOTE — NURSING NOTE
Chief Complaint   Patient presents with     Prostate Cancer     9 months with PSA     Pt states he is still having hot flashes.    Anna Clancy, CMA

## 2023-10-30 ENCOUNTER — PATIENT OUTREACH (OUTPATIENT)
Dept: CARE COORDINATION | Facility: CLINIC | Age: 68
End: 2023-10-30
Payer: MEDICARE

## 2023-11-13 ENCOUNTER — MYC MEDICAL ADVICE (OUTPATIENT)
Dept: FAMILY MEDICINE | Facility: CLINIC | Age: 68
End: 2023-11-13
Payer: MEDICARE

## 2023-11-13 ENCOUNTER — PATIENT OUTREACH (OUTPATIENT)
Dept: CARE COORDINATION | Facility: CLINIC | Age: 68
End: 2023-11-13
Payer: MEDICARE

## 2023-11-13 DIAGNOSIS — C61 PROSTATE CANCER (H): Primary | ICD-10-CM

## 2023-11-13 DIAGNOSIS — E78.5 HYPERLIPIDEMIA, UNSPECIFIED HYPERLIPIDEMIA TYPE: ICD-10-CM

## 2023-11-13 DIAGNOSIS — Z13.1 SCREENING FOR DIABETES MELLITUS: ICD-10-CM

## 2023-12-07 ENCOUNTER — MYC MEDICAL ADVICE (OUTPATIENT)
Dept: FAMILY MEDICINE | Facility: CLINIC | Age: 68
End: 2023-12-07
Payer: MEDICARE

## 2023-12-11 NOTE — TELEPHONE ENCOUNTER
My chart message sent     Oumou Laurent RN, BSN  [facility]

## 2023-12-29 ENCOUNTER — LAB (OUTPATIENT)
Dept: LAB | Facility: CLINIC | Age: 68
End: 2023-12-29
Payer: MEDICARE

## 2023-12-29 DIAGNOSIS — C61 PROSTATE CANCER (H): ICD-10-CM

## 2023-12-29 DIAGNOSIS — Z13.1 SCREENING FOR DIABETES MELLITUS: ICD-10-CM

## 2023-12-29 DIAGNOSIS — E78.5 HYPERLIPIDEMIA, UNSPECIFIED HYPERLIPIDEMIA TYPE: ICD-10-CM

## 2023-12-29 LAB
ALBUMIN SERPL BCG-MCNC: 4.4 G/DL (ref 3.5–5.2)
ALP SERPL-CCNC: 103 U/L (ref 40–150)
ALT SERPL W P-5'-P-CCNC: 40 U/L (ref 0–70)
ANION GAP SERPL CALCULATED.3IONS-SCNC: 9 MMOL/L (ref 7–15)
AST SERPL W P-5'-P-CCNC: 38 U/L (ref 0–45)
BILIRUB SERPL-MCNC: 0.6 MG/DL
BUN SERPL-MCNC: 23.6 MG/DL (ref 8–23)
CALCIUM SERPL-MCNC: 9.3 MG/DL (ref 8.8–10.2)
CHLORIDE SERPL-SCNC: 104 MMOL/L (ref 98–107)
CHOLEST SERPL-MCNC: 109 MG/DL
CREAT SERPL-MCNC: 1.05 MG/DL (ref 0.67–1.17)
DEPRECATED HCO3 PLAS-SCNC: 26 MMOL/L (ref 22–29)
EGFRCR SERPLBLD CKD-EPI 2021: 76 ML/MIN/1.73M2
FASTING STATUS PATIENT QL REPORTED: YES
GLUCOSE SERPL-MCNC: 109 MG/DL (ref 70–99)
HDLC SERPL-MCNC: 39 MG/DL
LDLC SERPL CALC-MCNC: 50 MG/DL
NONHDLC SERPL-MCNC: 70 MG/DL
POTASSIUM SERPL-SCNC: 4.1 MMOL/L (ref 3.4–5.3)
PROT SERPL-MCNC: 8.2 G/DL (ref 6.4–8.3)
PSA SERPL DL<=0.01 NG/ML-MCNC: 0.05 NG/ML (ref 0–6.5)
SODIUM SERPL-SCNC: 139 MMOL/L (ref 135–145)
TRIGL SERPL-MCNC: 100 MG/DL

## 2023-12-29 PROCEDURE — 36415 COLL VENOUS BLD VENIPUNCTURE: CPT

## 2023-12-29 PROCEDURE — 80061 LIPID PANEL: CPT

## 2023-12-29 PROCEDURE — 84153 ASSAY OF PSA TOTAL: CPT

## 2023-12-29 PROCEDURE — 80053 COMPREHEN METABOLIC PANEL: CPT

## 2024-01-02 ASSESSMENT — ENCOUNTER SYMPTOMS
DIZZINESS: 0
DIARRHEA: 0
WEAKNESS: 0
HEMATOCHEZIA: 1
NERVOUS/ANXIOUS: 0
HEADACHES: 0
NAUSEA: 0
EYE PAIN: 0
PARESTHESIAS: 0
SHORTNESS OF BREATH: 0
DYSURIA: 1
CONSTIPATION: 0
JOINT SWELLING: 0
ARTHRALGIAS: 0
FREQUENCY: 1
HEMATURIA: 0
COUGH: 0
CHILLS: 0
FEVER: 0
PALPITATIONS: 0
ABDOMINAL PAIN: 0
MYALGIAS: 0
HEARTBURN: 0
SORE THROAT: 0

## 2024-01-02 ASSESSMENT — ACTIVITIES OF DAILY LIVING (ADL): CURRENT_FUNCTION: NO ASSISTANCE NEEDED

## 2024-01-03 ENCOUNTER — OFFICE VISIT (OUTPATIENT)
Dept: FAMILY MEDICINE | Facility: CLINIC | Age: 69
End: 2024-01-03
Payer: MEDICARE

## 2024-01-03 VITALS
BODY MASS INDEX: 28.14 KG/M2 | OXYGEN SATURATION: 99 % | SYSTOLIC BLOOD PRESSURE: 118 MMHG | RESPIRATION RATE: 18 BRPM | TEMPERATURE: 97.6 F | WEIGHT: 201 LBS | DIASTOLIC BLOOD PRESSURE: 82 MMHG | HEART RATE: 64 BPM | HEIGHT: 71 IN

## 2024-01-03 DIAGNOSIS — R30.0 DYSURIA: ICD-10-CM

## 2024-01-03 DIAGNOSIS — Z00.00 ENCOUNTER FOR MEDICARE ANNUAL WELLNESS EXAM: Primary | ICD-10-CM

## 2024-01-03 DIAGNOSIS — C61 PROSTATE CANCER (H): ICD-10-CM

## 2024-01-03 LAB
ALBUMIN UR-MCNC: NEGATIVE MG/DL
APPEARANCE UR: CLEAR
BILIRUB UR QL STRIP: NEGATIVE
COLOR UR AUTO: YELLOW
GLUCOSE UR STRIP-MCNC: NEGATIVE MG/DL
HGB UR QL STRIP: NEGATIVE
KETONES UR STRIP-MCNC: NEGATIVE MG/DL
LEUKOCYTE ESTERASE UR QL STRIP: NEGATIVE
NITRATE UR QL: NEGATIVE
PH UR STRIP: 6 [PH] (ref 5–7)
SP GR UR STRIP: >=1.03 (ref 1–1.03)
UROBILINOGEN UR STRIP-ACNC: 0.2 E.U./DL

## 2024-01-03 PROCEDURE — 99213 OFFICE O/P EST LOW 20 MIN: CPT | Mod: 25 | Performed by: FAMILY MEDICINE

## 2024-01-03 PROCEDURE — G0439 PPPS, SUBSEQ VISIT: HCPCS | Performed by: FAMILY MEDICINE

## 2024-01-03 PROCEDURE — 87798 DETECT AGENT NOS DNA AMP: CPT | Mod: 90 | Performed by: FAMILY MEDICINE

## 2024-01-03 PROCEDURE — 87086 URINE CULTURE/COLONY COUNT: CPT | Performed by: FAMILY MEDICINE

## 2024-01-03 PROCEDURE — 81003 URINALYSIS AUTO W/O SCOPE: CPT | Performed by: FAMILY MEDICINE

## 2024-01-03 PROCEDURE — 99000 SPECIMEN HANDLING OFFICE-LAB: CPT | Performed by: FAMILY MEDICINE

## 2024-01-03 PROCEDURE — 87563 M. GENITALIUM AMP PROBE: CPT | Mod: 90 | Performed by: FAMILY MEDICINE

## 2024-01-03 RX ORDER — RESPIRATORY SYNCYTIAL VIRUS VACCINE 120MCG/0.5
0.5 KIT INTRAMUSCULAR ONCE
Qty: 1 EACH | Refills: 0 | Status: CANCELLED | OUTPATIENT
Start: 2024-01-03 | End: 2024-01-03

## 2024-01-03 ASSESSMENT — ENCOUNTER SYMPTOMS
JOINT SWELLING: 0
HEMATOCHEZIA: 1
WEAKNESS: 0
HEADACHES: 0
SHORTNESS OF BREATH: 0
SORE THROAT: 0
HEMATURIA: 0
DIARRHEA: 0
COUGH: 0
FEVER: 0
MYALGIAS: 0
CHILLS: 0
PARESTHESIAS: 0
DIZZINESS: 0
NERVOUS/ANXIOUS: 0
NAUSEA: 0
EYE PAIN: 0
ARTHRALGIAS: 0
CONSTIPATION: 0
DYSURIA: 1
ABDOMINAL PAIN: 0
PALPITATIONS: 0
FREQUENCY: 1
HEARTBURN: 0

## 2024-01-03 ASSESSMENT — ACTIVITIES OF DAILY LIVING (ADL): CURRENT_FUNCTION: NO ASSISTANCE NEEDED

## 2024-01-03 NOTE — PATIENT INSTRUCTIONS
Patient Education   Personalized Prevention Plan  You are due for the preventive services outlined below.  Your care team is available to assist you in scheduling these services.  If you have already completed any of these items, please share that information with your care team to update in your medical record.  Health Maintenance Due   Topic Date Due     Annual Wellness Visit  11/28/2023     Zoster (Shingles) Vaccine (2 of 2) 04/03/2023

## 2024-01-03 NOTE — PROGRESS NOTES
"SUBJECTIVE:   Gisselle is a 68 year old, presenting for the following:  Physical        1/3/2024     6:53 AM   Additional Questions   Roomed by Thi BOSCH       Are you in the first 12 months of your Medicare coverage?  No    Healthy Habits:     In general, how would you rate your overall health?  Excellent    Frequency of exercise:  2-3 days/week    Duration of exercise:  30-45 minutes    Do you usually eat at least 4 servings of fruit and vegetables a day, include whole grains    & fiber and avoid regularly eating high fat or \"junk\" foods?  Yes    Taking medications regularly:  Yes    Medication side effects:  None    Ability to successfully perform activities of daily living:  No assistance needed    Home Safety:  No safety concerns identified    Hearing Impairment:  Difficulty following a conversation in a noisy restaurant or crowded room, feel that people are mumbling or not speaking clearly, need to ask people to speak up or repeat themselves, find that men's voices are easier to understand than woman's, difficulty understanding soft or whispered speech and difficulty understanding speech on the telephone    In the past 6 months, have you been bothered by leaking of urine? Yes    In general, how would you rate your overall mental or emotional health?  Excellent    Additional concerns today:  Yes    Dysuria: He has been noticing more pain in penis/urethra. If he drinks enough fluid, won't notice the pain, however, this is difficult. He states the pain can interfere with his activity. He is up a few times at night to urinate and notices a double stream when urinating. He went to urgent care a few weeks ago in [place] and was told he didn't have any urinary tract infection but was put on Flomax for 2 weeks. He didn't notice any improvement with this.    History of prostate cancer s/p radiation and Lupron therapy.    Today's PHQ-2 Score:       1/2/2024    12:53 PM   PHQ-2 ( 1999 [employer])   Q1: Little interest or pleasure " in doing things 0   Q2: Feeling down, depressed or hopeless 0   PHQ-2 Score 0   Q1: Little interest or pleasure in doing things Not at all   Q2: Feeling down, depressed or hopeless Not at all   PHQ-2 Score 0       Have you ever done Advance Care Planning? (For example, a Health Directive, POLST, or a discussion with a medical provider or your loved ones about your wishes): Yes, advance care planning is on file.      Fall risk  Fallen 2 or more times in the past year?: Yes  Any fall with injury in the past year?: Yes    Cognitive Screening   1) Repeat 3 items (Leader, Season, Table)    2) Clock draw: NORMAL  3) 3 item recall: Recalls 2 objects   Results: NORMAL clock, 1-2 items recalled: COGNITIVE IMPAIRMENT LESS LIKELY    Mini-CogTM Copyright NICOLE Dacosta. Licensed by the author for use in [facility]; reprinted with permission (elza@[facility]). All rights reserved.      Do you have sleep apnea, excessive snoring or daytime drowsiness? : no    Reviewed and updated as needed this visit by clinical staff   Tobacco  Allergies  Meds              Reviewed and updated as needed this visit by Provider                 Social History     Tobacco Use    Smoking status: Former     Packs/day: 0.50     Years: 1.00     Additional pack years: 0.00     Total pack years: 0.50     Types: Cigarettes     Start date: 1962     Quit date: 1965     Years since quittin.3    Smokeless tobacco: Never    Tobacco comments:     I was a 9-?12   Substance Use Topics    Alcohol use: Yes     Comment: I have a drink once about every other month, wine or beer             2024    12:53 PM   Alcohol Use   Prescreen: >3 drinks/day or >7 drinks/week? No       Do you have a current opioid prescription? No  Do you use any other controlled substances or medications that are not prescribed by a provider? None      Hyperlipidemia Follow-Up    Are you regularly taking any medication or supplement to lower your cholesterol?   Yes-  atorvastatin   Are you having muscle aches or other side effects that you think could be caused by your cholesterol lowering medication?  No    Current providers sharing in care for this patient include:   Patient Care Team:  Twin Holcomb DO as PCP - General (Family Practice)  Eamon Cano MD as MD (Urology)  Twin Holcomb DO as Assigned PCP  Eamon Cano MD as Assigned Surgical Provider    The following health maintenance items are reviewed in Epic and correct as of today:  Health Maintenance   Topic Date Due    RSV VACCINE (Pregnancy & 60+) (1 - 1-dose 60+ series) Never done    INFLUENZA VACCINE (1) 09/01/2023    COVID-19 Vaccine (4 - 2023-24 season) 09/01/2023    MEDICARE ANNUAL WELLNESS VISIT  11/28/2023    ANNUAL REVIEW OF HM ORDERS  11/28/2023    ZOSTER IMMUNIZATION (2 of 2) 04/03/2023    FALL RISK ASSESSMENT  01/03/2025    LIPID  12/29/2028    ADVANCE CARE PLANNING  01/03/2029    DTAP/TDAP/TD IMMUNIZATION (2 - Td or Tdap) 08/05/2029    HEPATITIS C SCREENING  Completed    PHQ-2 (once per calendar year)  Completed    Pneumococcal Vaccine: 65+ Years  Completed    AORTIC ANEURYSM SCREENING (SYSTEM ASSIGNED)  Completed    IPV IMMUNIZATION  Aged Out    HPV IMMUNIZATION  Aged Out    MENINGITIS IMMUNIZATION  Aged Out    RSV MONOCLONAL ANTIBODY  Aged Out    COLORECTAL CANCER SCREENING  Discontinued       Review of Systems   Constitutional:  Negative for chills and fever.   HENT:  Negative for congestion, ear pain, hearing loss and sore throat.    Eyes:  Negative for pain and visual disturbance.   Respiratory:  Negative for cough and shortness of breath.    Cardiovascular:  Negative for chest pain, palpitations and peripheral edema.   Gastrointestinal:  Positive for hematochezia. Negative for abdominal pain, constipation, diarrhea, heartburn and nausea.   Genitourinary:  Positive for dysuria, frequency, impotence and urgency. Negative for genital sores, hematuria and penile discharge.  "  Musculoskeletal:  Negative for arthralgias, joint swelling and myalgias.   Skin:  Negative for rash.   Neurological:  Negative for dizziness, weakness, headaches and paresthesias.   Psychiatric/Behavioral:  Negative for mood changes. The patient is not nervous/anxious.        OBJECTIVE:   /82   Pulse 64   Temp 97.6  F (36.4  C)   Resp 18   Ht 1.803 m (5' 11\")   Wt 91.2 kg (201 lb)   SpO2 99%   BMI 28.03 kg/m   Estimated body mass index is 28.03 kg/m  as calculated from the following:    Height as of this encounter: 1.803 m (5' 11\").    Weight as of this encounter: 91.2 kg (201 lb).  Physical Exam  GENERAL: healthy, alert and no distress  EYES: Eyes grossly normal to inspection  HENT: ear canals and TM's normal, nose and mouth without ulcers or lesions  NECK: no adenopathy and no asymmetry, masses, or scars  RESP: lungs clear to auscultation - no rales, rhonchi or wheezes  CV: regular rates and rhythm, normal S1 S2, no S3 or S4, and no murmur, click or rub  MS: no gross musculoskeletal defects noted, no edema  SKIN: no suspicious lesions or rashes  PSYCH: mentation appears normal, affect normal/bright    Diagnostic Test Results:  Labs reviewed in Epic    ASSESSMENT / PLAN:   1. Encounter for Medicare annual wellness exam  Health maintenance reviewed and updated. Emphasized importance of balanced diet and regular exercise.      2. Dysuria  Unclear etiology. Urinalysis negative. Will check ureaplasma and mycoplasma and send urine for culture. If all testing negative, will follow up with urology on recommendations for next steps.  - UA Macroscopic with reflex to Microscopic and Culture - Lab Collect; Future  - UA Macroscopic with reflex to Microscopic and Culture - Lab Collect  - Urogenital Ureaplasma and Mycoplasma Species by PCR; Future  - Urogenital Ureaplasma and Mycoplasma Species by PCR  - Urine Culture Aerobic Bacterial - lab collect; Future  - Urine Culture Aerobic Bacterial - lab collect    3. " "Prostate cancer (H)  S/p radiation and lupron therapy. PSA had been undetectable but now minimally detectable since discontinuing Lupron. Most recent check increased from 0.04 to 0.05. Has follow up scheduled with urology.      Patient has been advised of split billing requirements and indicates understanding: Yes      COUNSELING:  Reviewed preventive health counseling, as reflected in patient instructions      BMI:   Estimated body mass index is 28.03 kg/m  as calculated from the following:    Height as of this encounter: 1.803 m (5' 11\").    Weight as of this encounter: 91.2 kg (201 lb).         He reports that he quit smoking about 58 years ago. His smoking use included cigarettes. He started smoking about 61 years ago. He has a 0.5 pack-year smoking history. He has never used smokeless tobacco.      Appropriate preventive services were discussed with this patient, including applicable screening as appropriate for fall prevention, nutrition, physical activity, Tobacco-use cessation, weight loss and cognition.  Checklist reviewing preventive services available has been given to the patient.    Reviewed patients plan of care and provided an AVS. The Basic Care Plan (routine screening as documented in Health Maintenance) for Joseph meets the Care Plan requirement. This Care Plan has been established and reviewed with the Patient.          Twin Holcomb DO  [facility] [place]    Identified Health Risks:  I have reviewed Opioid Use Disorder and Substance Use Disorder risk factors and made any needed referrals.   "

## 2024-01-04 LAB — BACTERIA UR CULT: NO GROWTH

## 2024-01-06 LAB
M GENITALIUM DNA SPEC QL NAA+PROBE: NOT DETECTED
M HOMINIS DNA SPEC QL NAA+PROBE: NOT DETECTED
U PARVUM DNA SPEC QL NAA+PROBE: NOT DETECTED
U UREALYTICUM DNA SPEC QL NAA+PROBE: NOT DETECTED

## 2024-03-07 DIAGNOSIS — Z91.89 RISK OF MYOCARDIAL INFARCTION OR STROKE 7.5% OR GREATER IN NEXT 10 YEARS: ICD-10-CM

## 2024-03-07 RX ORDER — ATORVASTATIN CALCIUM 20 MG/1
TABLET, FILM COATED ORAL
Qty: 90 TABLET | Refills: 2 | Status: SHIPPED | OUTPATIENT
Start: 2024-03-07

## 2024-03-21 ENCOUNTER — TELEPHONE (OUTPATIENT)
Dept: UROLOGY | Facility: CLINIC | Age: 69
End: 2024-03-21
Payer: MEDICARE

## 2024-03-21 DIAGNOSIS — C61 PROSTATE CANCER (H): Primary | ICD-10-CM

## 2024-03-21 NOTE — TELEPHONE ENCOUNTER
[facility] Call Center    Phone Message    May a detailed message be left on voicemail: yes     Reason for Call: Other: Patient wants PSA lab orders put in for tomorrow's lab appointment. Thank you.     Action Taken: [facility]    Travel Screening: Not Applicable

## 2024-03-27 ENCOUNTER — LAB (OUTPATIENT)
Dept: LAB | Facility: CLINIC | Age: 69
End: 2024-03-27
Payer: MEDICARE

## 2024-03-27 DIAGNOSIS — C61 PROSTATE CANCER (H): ICD-10-CM

## 2024-03-27 LAB — PSA SERPL DL<=0.01 NG/ML-MCNC: 0.04 NG/ML (ref 0–6.5)

## 2024-03-27 PROCEDURE — 36415 COLL VENOUS BLD VENIPUNCTURE: CPT

## 2024-03-27 PROCEDURE — 84153 ASSAY OF PSA TOTAL: CPT

## 2024-04-08 ENCOUNTER — OFFICE VISIT (OUTPATIENT)
Dept: UROLOGY | Facility: CLINIC | Age: 69
End: 2024-04-08
Payer: MEDICARE

## 2024-04-08 VITALS
BODY MASS INDEX: 27.09 KG/M2 | SYSTOLIC BLOOD PRESSURE: 153 MMHG | HEART RATE: 64 BPM | HEIGHT: 72 IN | DIASTOLIC BLOOD PRESSURE: 88 MMHG | WEIGHT: 200 LBS

## 2024-04-08 DIAGNOSIS — R10.2 PELVIC PAIN IN MALE: ICD-10-CM

## 2024-04-08 DIAGNOSIS — R30.0 DYSURIA: Primary | ICD-10-CM

## 2024-04-08 LAB
ALBUMIN UR-MCNC: NEGATIVE MG/DL
APPEARANCE UR: CLEAR
BILIRUB UR QL STRIP: NEGATIVE
COLOR UR AUTO: YELLOW
GLUCOSE UR STRIP-MCNC: NEGATIVE MG/DL
HGB UR QL STRIP: NEGATIVE
KETONES UR STRIP-MCNC: NEGATIVE MG/DL
LEUKOCYTE ESTERASE UR QL STRIP: NEGATIVE
NITRATE UR QL: NEGATIVE
PH UR STRIP: 5.5 [PH] (ref 5–7)
RESIDUAL VOLUME (RV) (EXTERNAL): 36
SP GR UR STRIP: <=1.005 (ref 1–1.03)
UROBILINOGEN UR STRIP-ACNC: 0.2 E.U./DL

## 2024-04-08 PROCEDURE — 99214 OFFICE O/P EST MOD 30 MIN: CPT | Mod: 25 | Performed by: UROLOGY

## 2024-04-08 PROCEDURE — 51798 US URINE CAPACITY MEASURE: CPT | Performed by: UROLOGY

## 2024-04-08 PROCEDURE — 81003 URINALYSIS AUTO W/O SCOPE: CPT | Mod: QW | Performed by: UROLOGY

## 2024-04-08 ASSESSMENT — PAIN SCALES - GENERAL: PAINLEVEL: NO PAIN (0)

## 2024-04-08 NOTE — NURSING NOTE
Chief Complaint   Patient presents with    Prostate Cancer     6 months with PSA      Pt states he is having pain in penis, sometimes with urination. Pt states he is waking 3-4 times a night to urinate, pt states he needs to keep fluids going to avoid discomfort    PVR: 36 mL    Anna Clancy, CMA

## 2024-04-08 NOTE — PROGRESS NOTES
Office Visit Note  [facility]  [phone]    UROLOGIC DIAGNOSES:   High risk prostate cancer  Erectile dysfunction     CURRENT INTERVENTIONS:   Radiotherapy +3 years hormonal therapy, Completed     HISTORY:   Gisselle returns to clinic today for prostate cancer follow-up.  His PSA remains stable and unchanged at 0.04.  He does report pelvic pain.  He is not certain what exacerbates this pain but he says that he drinks fluids and stays hydrated the pain goes away.  It is also relieved by Tylenol.  He has no urinary symptoms or complaints.      PAST MEDICAL HISTORY:   Past Medical History:   Diagnosis Date    Arthritis     Cancer (H) Oct 2019    I have finished radiation & hormone therapy. And my PSA is great now.    Hyperlipidemia     Mumps        PAST SURGICAL HISTORY:   Past Surgical History:   Procedure Laterality Date    APPENDECTOMY      BIOPSY  Oct 2019    prostate    COLONOSCOPY N/A 2023    Procedure: COLONOSCOPY;  Surgeon: Gavin Streeter MD;  Location:  GI    FACIAL RECONSTRUCTION SURGERY      had cheek bone fixed after trauma    HERNIA REPAIR      VASECTOMY      VASECTOMY      vasectomy reversal      VASOVASOSTOMY         FAMILY HISTORY:   Family History   Problem Relation Age of Onset    Hypertension Mother     Hypertension Father     Diabetes Father     Skin Cancer Father     Alzheimer Disease Father     Cancer Brother         exposed to agent orange    Diabetes Brother     Hypertension Brother     Substance Abuse Brother     Obesity Brother     Hyperlipidemia Brother     Depression Brother     Colon Cancer No family hx of        SOCIAL HISTORY:   Social History     Socioeconomic History    Marital status:    Tobacco Use    Smoking status: Former     Packs/day: 0.50     Years: 1.00     Additional pack years: 0.00     Total pack years: 0.50     Types: Cigarettes     Start date: 1962     Quit date: 1965     Years since quittin.6    Smokeless tobacco: Never     Tobacco comments:     I was a 9-?12   Vaping Use    Vaping Use: Never used   Substance and Sexual Activity    Alcohol use: Yes     Comment: I have a drink once about every other month, wine or beer    Drug use: Not Currently     Comment: I want to drink or try ?CBC products more often lately, but    Sexual activity: Yes     Partners: Female     Birth control/protection: Post-menopausal     Comment: Since radiation and hormone therapy, it's not very normal.   Other Topics Concern    Parent/sibling w/ CABG, MI or angioplasty before 65F 55M? No     Social Determinants of Health     Financial Resource Strain: Low Risk  (1/2/2024)    Financial Resource Strain     Within the past 12 months, have you or your family members you live with been unable to get utilities (heat, electricity) when it was really needed?: No   Food Insecurity: Low Risk  (1/2/2024)    Food Insecurity     Within the past 12 months, did you worry that your food would run out before you got money to buy more?: No     Within the past 12 months, did the food you bought just not last and you didn t have money to get more?: No   Transportation Needs: Low Risk  (1/2/2024)    Transportation Needs     Within the past 12 months, has lack of transportation kept you from medical appointments, getting your medicines, non-medical meetings or appointments, work, or from getting things that you need?: No   Housing Stability: Low Risk  (1/2/2024)    Housing Stability     Do you have housing? : Yes     Are you worried about losing your housing?: No       Review Of Systems:  Skin: No rash, pruritis, or skin pigmentation  Eyes: No changes in vision  Ears/Nose/Throat: No changes in hearing, no nosebleeds  Respiratory: No shortness of breath, dyspnea on exertion, cough, or hemoptysis  Cardiovascular: No chest pain or palpitations  Gastrointestinal: No diarrhea or constipation. No abdominal pain. No hematochezia  Genitourinary: see HPI  Musculoskeletal: No pain or  swelling of joints, normal range of motion  Neurologic: No weakness or tremors  Psychiatric: No recent changes in memory or mood  Hematologic/Lymphatic/Immunologic: No easy bruising or enlarged lymph nodes  Endocrine: No weight gain or loss      PHYSICAL EXAM:    There were no vitals taken for this visit.    Constitutional: Well developed. Conversant and in no acute distress  Eyes: Anicteric sclera, conjunctiva clear, normal extraocular movements  ENT: Normocephalic and atraumatic,   Skin: Warm and dry. No rashes or lesions  Cardiac: No peripheral edema  Back/Flank: Not done  CNS/PNS: Normal musculature and movements, moves all extremities normally  Respiratory: Normal non-labored breathing  Abdomen: Soft nontender and nondistended  Peripheral Vascular: No peripheral edema  Mental Status/Psych: Alert and Oriented x 3. Normal mood and affect    Penis: Not done  Scrotal Skin: Not done  Testicles: Not done  Epididymis: Not done  Digital Rectal Exam:     Cystoscopy: Not done    Imaging: None    Urinalysis: UA RESULTS:  Recent Labs   Lab Test 01/03/24  0757 10/11/21  1522   COLOR Yellow Yellow   APPEARANCE Clear Clear   URINEGLC Negative Negative   URINEBILI Negative Negative   URINEKETONE Negative Negative   SG >=1.030 1.025   UBLD Negative Trace*   URINEPH 6.0 7.0   PROTEIN Negative Negative   UROBILINOGEN 0.2 0.2   NITRITE Negative Negative   LEUKEST Negative Negative   RBCU  --  0-2   WBCU  --  0-5       PSA: 0.04    Post Void Residual: 36mL    Other labs: None today      IMPRESSION:  History of prostate cancer  Pelvic pain    PLAN:  He has not received a Lupron injection for 2 years and his PSA remains stable.  I recommended continued annual  surveillance of his PSA.      We discussed his pelvic pain.  It is of unknown etiology.  It does not really fit any patterns.  We did discuss potential further workup and he would like to proceed with an office cystoscopy.  I think it would be reasonable to proceed with office  cystoscopy at this time to rule out the formation of a bladder stone or other abnormalities.      Eamon Cano M.D.

## 2024-04-08 NOTE — LETTER
4/8/2024       RE: Joseph Grissom  [address]  [place] [address]     Dear Colleague,    Thank you for referring your patient, Joseph Grissom, to the [facility] [place] at [facility]. Please see a copy of my visit note below.    Office Visit Note  [facility]  [phone]    UROLOGIC DIAGNOSES:   High risk prostate cancer  Erectile dysfunction     CURRENT INTERVENTIONS:   Radiotherapy +3 years hormonal therapy, Completed 2022    HISTORY:   Gisselle returns to clinic today for prostate cancer follow-up.  His PSA remains stable and unchanged at 0.04.  He does report pelvic pain.  He is not certain what exacerbates this pain but he says that he drinks fluids and stays hydrated the pain goes away.  It is also relieved by Tylenol.  He has no urinary symptoms or complaints.      PAST MEDICAL HISTORY:   Past Medical History:   Diagnosis Date    Arthritis     Cancer (H) Oct 2019    I have finished radiation & hormone therapy. And my PSA is great now.    Hyperlipidemia     Mumps        PAST SURGICAL HISTORY:   Past Surgical History:   Procedure Laterality Date    APPENDECTOMY      BIOPSY  Oct 2019    prostate    COLONOSCOPY N/A 03/23/2023    Procedure: COLONOSCOPY;  Surgeon: Gavin Streeter MD;  Location:  GI    FACIAL RECONSTRUCTION SURGERY      had cheek bone fixed after trauma    HERNIA REPAIR      VASECTOMY      VASECTOMY      vasectomy reversal      VASOVASOSTOMY         FAMILY HISTORY:   Family History   Problem Relation Age of Onset    Hypertension Mother     Hypertension Father     Diabetes Father     Skin Cancer Father     Alzheimer Disease Father     Cancer Brother         exposed to agent orange    Diabetes Brother     Hypertension Brother     Substance Abuse Brother     Obesity Brother     Hyperlipidemia Brother     Depression Brother     Colon Cancer No family hx of        SOCIAL HISTORY:   Social History      Socioeconomic History    Marital status:    Tobacco Use    Smoking status: Former     Packs/day: 0.50     Years: 1.00     Additional pack years: 0.00     Total pack years: 0.50     Types: Cigarettes     Start date: 1962     Quit date: 1965     Years since quittin.6    Smokeless tobacco: Never    Tobacco comments:     I was a 9-?12   Vaping Use    Vaping Use: Never used   Substance and Sexual Activity    Alcohol use: Yes     Comment: I have a drink once about every other month, wine or beer    Drug use: Not Currently     Comment: I want to drink or try ?CBC products more often lately, but    Sexual activity: Yes     Partners: Female     Birth control/protection: Post-menopausal     Comment: Since radiation and hormone therapy, it's not very normal.   Other Topics Concern    Parent/sibling w/ CABG, MI or angioplasty before 65F 55M? No     Social Determinants of Health     Financial Resource Strain: Low Risk  (2024)    Financial Resource Strain     Within the past 12 months, have you or your family members you live with been unable to get utilities (heat, electricity) when it was really needed?: No   Food Insecurity: Low Risk  (2024)    Food Insecurity     Within the past 12 months, did you worry that your food would run out before you got money to buy more?: No     Within the past 12 months, did the food you bought just not last and you didn t have money to get more?: No   Transportation Needs: Low Risk  (2024)    Transportation Needs     Within the past 12 months, has lack of transportation kept you from medical appointments, getting your medicines, non-medical meetings or appointments, work, or from getting things that you need?: No   Housing Stability: Low Risk  (2024)    Housing Stability     Do you have housing? : Yes     Are you worried about losing your housing?: No       Review Of Systems:  Skin: No rash, pruritis, or skin pigmentation  Eyes: No changes in  vision  Ears/Nose/Throat: No changes in hearing, no nosebleeds  Respiratory: No shortness of breath, dyspnea on exertion, cough, or hemoptysis  Cardiovascular: No chest pain or palpitations  Gastrointestinal: No diarrhea or constipation. No abdominal pain. No hematochezia  Genitourinary: see HPI  Musculoskeletal: No pain or swelling of joints, normal range of motion  Neurologic: No weakness or tremors  Psychiatric: No recent changes in memory or mood  Hematologic/Lymphatic/Immunologic: No easy bruising or enlarged lymph nodes  Endocrine: No weight gain or loss      PHYSICAL EXAM:    There were no vitals taken for this visit.    Constitutional: Well developed. Conversant and in no acute distress  Eyes: Anicteric sclera, conjunctiva clear, normal extraocular movements  ENT: Normocephalic and atraumatic,   Skin: Warm and dry. No rashes or lesions  Cardiac: No peripheral edema  Back/Flank: Not done  CNS/PNS: Normal musculature and movements, moves all extremities normally  Respiratory: Normal non-labored breathing  Abdomen: Soft nontender and nondistended  Peripheral Vascular: No peripheral edema  Mental Status/Psych: Alert and Oriented x 3. Normal mood and affect    Penis: Not done  Scrotal Skin: Not done  Testicles: Not done  Epididymis: Not done  Digital Rectal Exam:     Cystoscopy: Not done    Imaging: None    Urinalysis: UA RESULTS:  Recent Labs   Lab Test 01/03/24  0757 10/11/21  1522   COLOR Yellow Yellow   APPEARANCE Clear Clear   URINEGLC Negative Negative   URINEBILI Negative Negative   URINEKETONE Negative Negative   SG >=1.030 1.025   UBLD Negative Trace*   URINEPH 6.0 7.0   PROTEIN Negative Negative   UROBILINOGEN 0.2 0.2   NITRITE Negative Negative   LEUKEST Negative Negative   RBCU  --  0-2   WBCU  --  0-5       PSA: 0.04    Post Void Residual: 36mL    Other labs: None today      IMPRESSION:  History of prostate cancer  Pelvic pain    PLAN:  He has not received a Lupron injection for 2 years and his PSA  remains stable.  I recommended continued annual  surveillance of his PSA.      We discussed his pelvic pain.  It is of unknown etiology.  It does not really fit any patterns.  We did discuss potential further workup and he would like to proceed with an office cystoscopy.  I think it would be reasonable to proceed with office cystoscopy at this time to rule out the formation of a bladder stone or other abnormalities.      Eamon Cano M.D.

## 2024-05-06 ENCOUNTER — OFFICE VISIT (OUTPATIENT)
Dept: UROLOGY | Facility: CLINIC | Age: 69
End: 2024-05-06
Payer: MEDICARE

## 2024-05-06 VITALS
BODY MASS INDEX: 27.09 KG/M2 | DIASTOLIC BLOOD PRESSURE: 76 MMHG | HEIGHT: 72 IN | SYSTOLIC BLOOD PRESSURE: 132 MMHG | WEIGHT: 200 LBS

## 2024-05-06 DIAGNOSIS — Z79.2 PROPHYLACTIC ANTIBIOTIC: ICD-10-CM

## 2024-05-06 DIAGNOSIS — R10.2 PELVIC PAIN IN MALE: Primary | ICD-10-CM

## 2024-05-06 LAB
ALBUMIN UR-MCNC: NEGATIVE MG/DL
APPEARANCE UR: CLEAR
BILIRUB UR QL STRIP: NEGATIVE
COLOR UR AUTO: YELLOW
GLUCOSE UR STRIP-MCNC: NEGATIVE MG/DL
HGB UR QL STRIP: NEGATIVE
KETONES UR STRIP-MCNC: NEGATIVE MG/DL
LEUKOCYTE ESTERASE UR QL STRIP: NEGATIVE
NITRATE UR QL: NEGATIVE
PH UR STRIP: 6.5 [PH] (ref 5–7)
SP GR UR STRIP: 1.01 (ref 1–1.03)
UROBILINOGEN UR STRIP-ACNC: 0.2 E.U./DL

## 2024-05-06 PROCEDURE — 99213 OFFICE O/P EST LOW 20 MIN: CPT | Mod: 25 | Performed by: UROLOGY

## 2024-05-06 PROCEDURE — 81003 URINALYSIS AUTO W/O SCOPE: CPT | Mod: QW | Performed by: UROLOGY

## 2024-05-06 PROCEDURE — 52000 CYSTOURETHROSCOPY: CPT | Performed by: UROLOGY

## 2024-05-06 RX ORDER — LIDOCAINE HYDROCHLORIDE 20 MG/ML
JELLY TOPICAL ONCE
Status: COMPLETED | OUTPATIENT
Start: 2024-05-06 | End: 2024-05-06

## 2024-05-06 RX ORDER — CIPROFLOXACIN 500 MG/1
500 TABLET, FILM COATED ORAL ONCE
Qty: 1 TABLET | Refills: 0 | Status: SHIPPED | OUTPATIENT
Start: 2024-05-06 | End: 2024-05-06

## 2024-05-06 RX ADMIN — LIDOCAINE HYDROCHLORIDE 5 ML: 20 JELLY TOPICAL at 08:51

## 2024-05-06 ASSESSMENT — PAIN SCALES - GENERAL: PAINLEVEL: NO PAIN (0)

## 2024-05-06 NOTE — LETTER
5/6/2024       RE: Joseph Grissom  [address]  [place] [address]     Dear Colleague,    Thank you for referring your patient, Joseph Grissom, to the [facility] [place] at [facility]. Please see a copy of my visit note below.    Office Visit Note  [facility]  [phone]    UROLOGIC DIAGNOSES:   High risk prostate cancer  Pelvic pain  Erectile dysfunction    CURRENT INTERVENTIONS:   Radiotherapy +3 years hormonal therapy, completed 2022    HISTORY:   Gisselle returns to clinic today for further evaluation with office cystoscopy.  He continues to report pelvic pain.      PAST MEDICAL HISTORY:   Past Medical History:   Diagnosis Date    Arthritis     Cancer (H) Oct 2019    I have finished radiation & hormone therapy. And my PSA is great now.    Hyperlipidemia     Mumps        PAST SURGICAL HISTORY:   Past Surgical History:   Procedure Laterality Date    APPENDECTOMY      BIOPSY  Oct 2019    prostate    COLONOSCOPY N/A 03/23/2023    Procedure: COLONOSCOPY;  Surgeon: Gavin Streeter MD;  Location:  GI    FACIAL RECONSTRUCTION SURGERY      had cheek bone fixed after trauma    HERNIA REPAIR      VASECTOMY      VASECTOMY      vasectomy reversal      VASOVASOSTOMY         FAMILY HISTORY:   Family History   Problem Relation Age of Onset    Hypertension Mother     Hypertension Father     Diabetes Father     Skin Cancer Father     Alzheimer Disease Father     Cancer Brother         exposed to agent orange    Diabetes Brother     Hypertension Brother     Substance Abuse Brother     Obesity Brother     Hyperlipidemia Brother     Depression Brother     Colon Cancer No family hx of        SOCIAL HISTORY:   Social History     Socioeconomic History    Marital status:    Tobacco Use    Smoking status: Former     Current packs/day: 0.00     Average packs/day: 0.5 packs/day for 3.2 years (1.6 ttl pk-yrs)     Types:  Cigarettes     Start date: 1962     Quit date: 1965     Years since quittin.7    Smokeless tobacco: Never    Tobacco comments:     I was a 9-?12   Vaping Use    Vaping status: Never Used   Substance and Sexual Activity    Alcohol use: Yes     Comment: I have a drink once about every other month, wine or beer    Drug use: Not Currently     Comment: I want to drink or try ?CBC products more often lately, but    Sexual activity: Yes     Partners: Female     Birth control/protection: Post-menopausal     Comment: Since radiation and hormone therapy, it's not very normal.   Other Topics Concern    Parent/sibling w/ CABG, MI or angioplasty before 65F 55M? No     Social Determinants of Health     Financial Resource Strain: Low Risk  (2024)    Financial Resource Strain     Within the past 12 months, have you or your family members you live with been unable to get utilities (heat, electricity) when it was really needed?: No   Food Insecurity: Low Risk  (2024)    Food Insecurity     Within the past 12 months, did you worry that your food would run out before you got money to buy more?: No     Within the past 12 months, did the food you bought just not last and you didn t have money to get more?: No   Transportation Needs: Low Risk  (2024)    Transportation Needs     Within the past 12 months, has lack of transportation kept you from medical appointments, getting your medicines, non-medical meetings or appointments, work, or from getting things that you need?: No   Housing Stability: Low Risk  (2024)    Housing Stability     Do you have housing? : Yes     Are you worried about losing your housing?: No       Review Of Systems:  Skin: No rash, pruritis, or skin pigmentation  Eyes: No changes in vision  Ears/Nose/Throat: No changes in hearing, no nosebleeds  Respiratory: No shortness of breath, dyspnea on exertion, cough, or hemoptysis  Cardiovascular: No chest pain or palpitations  Gastrointestinal:  No diarrhea or constipation. No abdominal pain. No hematochezia  Genitourinary: see HPI  Musculoskeletal: No pain or swelling of joints, normal range of motion  Neurologic: No weakness or tremors  Psychiatric: No recent changes in memory or mood  Hematologic/Lymphatic/Immunologic: No easy bruising or enlarged lymph nodes  Endocrine: No weight gain or loss      PHYSICAL EXAM:    There were no vitals taken for this visit.    Constitutional: Well developed. Conversant and in no acute distress  Eyes: Anicteric sclera, conjunctiva clear, normal extraocular movements  ENT: Normocephalic and atraumatic,   Skin: Warm and dry. No rashes or lesions  Cardiac: No peripheral edema  Back/Flank: Not done  CNS/PNS: Normal musculature and movements, moves all extremities normally  Respiratory: Normal non-labored breathing  Abdomen: Soft nontender and nondistended  Peripheral Vascular: No peripheral edema  Mental Status/Psych: Alert and Oriented x 3. Normal mood and affect    Penis: Normal  Scrotal skin: Normal, no lesions  Testicles: Normal to palpation bilaterally  Epididymis: Normal to palpation bilaterally  Lymphatic: Normal inguinal lymph nodes    Digital Rectal Exam:     Cystoscopy: I performed flexible cystoscopy and the bladder was normal throughout.  No tumors identified throughout the bladder.  There were radiation changes in the area of the prostate and bladder neck were quite subtle.  The prostate appeared normal and nonobstructing.  The urethra showed no evidence of stricture or other abnormalities.    Imaging: None    Urinalysis: UA RESULTS:  Recent Labs   Lab Test 04/08/24  0955 01/03/24  0757 10/11/21  1522   COLOR Yellow   < > Yellow   APPEARANCE Clear   < > Clear   URINEGLC Negative   < > Negative   URINEBILI Negative   < > Negative   URINEKETONE Negative   < > Negative   SG <=1.005   < > 1.025   UBLD Negative   < > Trace*   URINEPH 5.5   < > 7.0   PROTEIN Negative   < > Negative   UROBILINOGEN 0.2   < > 0.2    NITRITE Negative   < > Negative   LEUKEST Negative   < > Negative   RBCU  --   --  0-2   WBCU  --   --  0-5    < > = values in this interval not displayed.       PSA: 0.04    Post Void Residual:     Other labs: None today      IMPRESSION:  Normal cystoscopy    PLAN:  His cystoscopy was normal today.  He was provided reassurance.  He is pelvic pain of unknown etiology.  I offered him to consider seeing pelvic floor physical therapy to discuss pelvic relaxation techniques.  He wishes to proceed.  I put a referral for pelvic floor physical therapy.  Otherwise, he will return to the urology clinic in 1 year for his next PSA check.        Eamon Cano M.D.

## 2024-05-06 NOTE — PROGRESS NOTES
Office Visit Note  [facility]  [phone]    UROLOGIC DIAGNOSES:   High risk prostate cancer  Pelvic pain  Erectile dysfunction    CURRENT INTERVENTIONS:   Radiotherapy +3 years hormonal therapy, completed     HISTORY:   Gisselle returns to clinic today for further evaluation with office cystoscopy.  He continues to report pelvic pain.      PAST MEDICAL HISTORY:   Past Medical History:   Diagnosis Date    Arthritis     Cancer (H) Oct 2019    I have finished radiation & hormone therapy. And my PSA is great now.    Hyperlipidemia     Mumps        PAST SURGICAL HISTORY:   Past Surgical History:   Procedure Laterality Date    APPENDECTOMY      BIOPSY  Oct 2019    prostate    COLONOSCOPY N/A 2023    Procedure: COLONOSCOPY;  Surgeon: Gavin Streeter MD;  Location:  GI    FACIAL RECONSTRUCTION SURGERY      had cheek bone fixed after trauma    HERNIA REPAIR      VASECTOMY      VASECTOMY      vasectomy reversal      VASOVASOSTOMY         FAMILY HISTORY:   Family History   Problem Relation Age of Onset    Hypertension Mother     Hypertension Father     Diabetes Father     Skin Cancer Father     Alzheimer Disease Father     Cancer Brother         exposed to agent orange    Diabetes Brother     Hypertension Brother     Substance Abuse Brother     Obesity Brother     Hyperlipidemia Brother     Depression Brother     Colon Cancer No family hx of        SOCIAL HISTORY:   Social History     Socioeconomic History    Marital status:    Tobacco Use    Smoking status: Former     Current packs/day: 0.00     Average packs/day: 0.5 packs/day for 3.2 years (1.6 ttl pk-yrs)     Types: Cigarettes     Start date: 1962     Quit date: 1965     Years since quittin.7    Smokeless tobacco: Never    Tobacco comments:     I was a 9-?12   Vaping Use    Vaping status: Never Used   Substance and Sexual Activity    Alcohol use: Yes     Comment: I have a drink once about every other month, wine or beer     Drug use: Not Currently     Comment: I want to drink or try ?CBC products more often lately, but    Sexual activity: Yes     Partners: Female     Birth control/protection: Post-menopausal     Comment: Since radiation and hormone therapy, it's not very normal.   Other Topics Concern    Parent/sibling w/ CABG, MI or angioplasty before 65F 55M? No     Social Determinants of Health     Financial Resource Strain: Low Risk  (1/2/2024)    Financial Resource Strain     Within the past 12 months, have you or your family members you live with been unable to get utilities (heat, electricity) when it was really needed?: No   Food Insecurity: Low Risk  (1/2/2024)    Food Insecurity     Within the past 12 months, did you worry that your food would run out before you got money to buy more?: No     Within the past 12 months, did the food you bought just not last and you didn t have money to get more?: No   Transportation Needs: Low Risk  (1/2/2024)    Transportation Needs     Within the past 12 months, has lack of transportation kept you from medical appointments, getting your medicines, non-medical meetings or appointments, work, or from getting things that you need?: No   Housing Stability: Low Risk  (1/2/2024)    Housing Stability     Do you have housing? : Yes     Are you worried about losing your housing?: No       Review Of Systems:  Skin: No rash, pruritis, or skin pigmentation  Eyes: No changes in vision  Ears/Nose/Throat: No changes in hearing, no nosebleeds  Respiratory: No shortness of breath, dyspnea on exertion, cough, or hemoptysis  Cardiovascular: No chest pain or palpitations  Gastrointestinal: No diarrhea or constipation. No abdominal pain. No hematochezia  Genitourinary: see HPI  Musculoskeletal: No pain or swelling of joints, normal range of motion  Neurologic: No weakness or tremors  Psychiatric: No recent changes in memory or mood  Hematologic/Lymphatic/Immunologic: No easy bruising or enlarged lymph  nodes  Endocrine: No weight gain or loss      PHYSICAL EXAM:    There were no vitals taken for this visit.    Constitutional: Well developed. Conversant and in no acute distress  Eyes: Anicteric sclera, conjunctiva clear, normal extraocular movements  ENT: Normocephalic and atraumatic,   Skin: Warm and dry. No rashes or lesions  Cardiac: No peripheral edema  Back/Flank: Not done  CNS/PNS: Normal musculature and movements, moves all extremities normally  Respiratory: Normal non-labored breathing  Abdomen: Soft nontender and nondistended  Peripheral Vascular: No peripheral edema  Mental Status/Psych: Alert and Oriented x 3. Normal mood and affect    Penis: Normal  Scrotal skin: Normal, no lesions  Testicles: Normal to palpation bilaterally  Epididymis: Normal to palpation bilaterally  Lymphatic: Normal inguinal lymph nodes    Digital Rectal Exam:     Cystoscopy: I performed flexible cystoscopy and the bladder was normal throughout.  No tumors identified throughout the bladder.  There were radiation changes in the area of the prostate and bladder neck were quite subtle.  The prostate appeared normal and nonobstructing.  The urethra showed no evidence of stricture or other abnormalities.    Imaging: None    Urinalysis: UA RESULTS:  Recent Labs   Lab Test 04/08/24  0955 01/03/24  0757 10/11/21  1522   COLOR Yellow   < > Yellow   APPEARANCE Clear   < > Clear   URINEGLC Negative   < > Negative   URINEBILI Negative   < > Negative   URINEKETONE Negative   < > Negative   SG <=1.005   < > 1.025   UBLD Negative   < > Trace*   URINEPH 5.5   < > 7.0   PROTEIN Negative   < > Negative   UROBILINOGEN 0.2   < > 0.2   NITRITE Negative   < > Negative   LEUKEST Negative   < > Negative   RBCU  --   --  0-2   WBCU  --   --  0-5    < > = values in this interval not displayed.       PSA: 0.04    Post Void Residual:     Other labs: None today      IMPRESSION:  Normal cystoscopy    PLAN:  His cystoscopy was normal today.  He was provided  reassurance.  He is pelvic pain of unknown etiology.  I offered him to consider seeing pelvic floor physical therapy to discuss pelvic relaxation techniques.  He wishes to proceed.  I put a referral for pelvic floor physical therapy.  Otherwise, he will return to the urology clinic in 1 year for his next PSA check.        Eamon Cano M.D.

## 2024-05-06 NOTE — PATIENT INSTRUCTIONS

## 2024-05-06 NOTE — NURSING NOTE
Chief Complaint   Patient presents with    Pelvic pain in male     Pt here for in office cystoscopy      Prior to the start of the procedure and with procedural staff participation, I verbally confirmed the patient s identity using two indicators, relevant allergies, that the procedure was appropriate and matched the consent or emergent situation, and that the correct equipment/implants were available. Immediately prior to starting the procedure I conducted the Time Out with the procedural staff and re-confirmed the patient s name, procedure, and site/side. I have wiped the patient off with the povidone-Iodine solution, draped them,  used Lidocaine hydrochloride jelly, and instilled sterile water into the bladder. (The Joint Commission universal protocol was followed.)  Yes    Sedation (Moderate or Deep): None     5mL 2% lidocaine hydrochloride Urojet instilled into urethra.    NDC# [address]-0  Lot #: [id number]  Expiration Date:  12/25    Anna Clancy CMA

## 2024-05-15 ENCOUNTER — THERAPY VISIT (OUTPATIENT)
Dept: PHYSICAL THERAPY | Facility: CLINIC | Age: 69
End: 2024-05-15
Attending: UROLOGY
Payer: MEDICARE

## 2024-05-15 DIAGNOSIS — R10.2 PELVIC PAIN IN MALE: ICD-10-CM

## 2024-05-15 PROCEDURE — 97530 THERAPEUTIC ACTIVITIES: CPT | Mod: GP

## 2024-05-15 PROCEDURE — 97161 PT EVAL LOW COMPLEX 20 MIN: CPT | Mod: GP

## 2024-05-15 PROCEDURE — 97110 THERAPEUTIC EXERCISES: CPT | Mod: GP

## 2024-05-15 PROCEDURE — 97140 MANUAL THERAPY 1/> REGIONS: CPT | Mod: GP

## 2024-05-15 NOTE — PROGRESS NOTES
PHYSICAL THERAPY EVALUATION  Type of Visit: Evaluation    See electronic medical record for Abuse and Falls Screening details.    Subjective       Presenting condition or subjective complaint: Pain my penis  Date of onset: 05/06/24    Relevant medical history: Arthritis; Cancer; Hearing problems; Overweight   Dates & types of surgery: Hernia 2013, Vasectomy 1985, Vasectomy reversal 1994, Facial reconstruction 1974, Appendectomy 1972    Prior diagnostic imaging/testing results: Other Urinalysis   Prior therapy history for the same diagnosis, illness or injury: No      Living Environment  Social support: With family members   Type of home: House; Multi-level   Stairs to enter the home: No       Ramp: Yes   Stairs inside the home: Yes 2 Is there a railing: Yes   Help at home: None  Equipment owned: [employer]     Employment: Yes Linguist, teacher  Hobbies/Interests: Running, motorcycling, reading, canoes, biking, attending [employer], walking    Patient goals for therapy: Drink less water- If I drink lots of water and other liquids my pain disappears.       Objective      PELVIC EVALUATION  ADDITIONAL HISTORY:  Sex assigned at birth: Male  Gender identity: Male    Pronouns: He/Him/His      Bladder History:  Feels bladder filling: Yes  Triggers for feeling of inability to wait to go to the bathroom: Yes running water, cold, wind,  How long can you wait to urinate: 30 seconds  Gets up at night to urinate: Yes 3-4  Can stop the flow of urine when urinating: Yes  Volume of urine usually released: Average   Other issues: Dribbling after urinating  Number of bladder infections in last 12 months:    Fluid intake per day: 48 ounces, 24 ounces    Medications taken for bladder: No     Activities causing urine leak: Cough; Sneeze; Jump; Hurrying to the bathroom due to a strong urge to urinate (pee)    Amount of urine typically leaked: a couple of drops  Pads used to help with leaking: No        Bowel History:  Frequency of bowel  movement: 2-3 times a day  Consistency of stool: Soft    Ignores the urge to defecate: No  Other bowel issues:    Length of time spent trying to have a bowel movement:      Sexual Function History:  Sexual orientation: Straight    Sexually active: Yes  Lubrication used: Yes Yes  Pelvic pain: Walking; Standing; Sitting; Initial penetration (rectal or vaginal)    Pain or difficulty with orgasms/erection/ejaculation: Yes If I have had lots of liquid, it's only slight. But before I knew to drink lots of liquid, pain prevented me from maintaining erections and from orgasms. I had hormone therapy and I'm not really normal since.  State of menopause:    Hormone medications: No      Are you currently pregnant: No, Do you get regular exercise: Yes, I do this type of exercise: Run, push-ups, crunchers, line dancing, Have you tried pelvic floor strengthening exercises for 4 weeks: No, Do you have any history of trauma that is relevant to your care that you d like to share: Yes, I d like to discuss it with my provider in person.    Discussed reason for referral regarding pelvic health needs and external/internal pelvic floor muscle examination with patient/guardian.  Opportunity provided to ask questions and verbal consent for assessment and intervention was given.    PAIN: Pain is Exacerbated By: running, erections, ejaculation  POSTURE: WNL  LUMBAR SCREEN: AROM WNL  PROM WNL  HIP SCREEN:  Strength:  4/5 L, 3+/5 R hip abduction    Functional Strength Testing: Double Leg Squat: Anterior knee translation, Knee valgus, Hip internal rotation, Increased trunk lean, Improper use of glutes/hips, Impaired weight distribution, and elevates heels     PELVIC/SI SCREEN:  BREATHING SYMMETRY: Rib cage flaring, Decreased rib cage mobility    PELVIC EXAM  Integumentary:   Anal: WNL  Penile: WNL    Internal Digital Palpation:  Per Rectum:  Digital Muscle Performance: P (Power): 4/5  Relaxation Post-Contraction: Normal    ABDOMINAL  ASSESSMENT  Diastasis Rectus Abdominis (JOSE):  JOSE presence: Yes  Location   Above Umbilicus Depth/Finger width: 2+   At Umbilicus Depth/Finger width: 2+    Abdominal Activation/Strength:  abdominal doming/coning w/ crunch, provokes penis pain      Assessment & Plan   CLINICAL IMPRESSIONS  Medical Diagnosis: Pelvic Pain    Treatment Diagnosis: Pelvic floor dysfunction   Impression/Assessment: Patient is a 68 year old male with pelvic pain complaints.  The following significant findings have been identified: Pain, Decreased ROM/flexibility, Decreased joint mobility, Decreased strength, Impaired muscle performance, and Decreased activity tolerance. These impairments interfere with their ability to perform self care tasks, recreational activities, and household chores as compared to previous level of function.     Clinical Decision Making (Complexity):  Clinical Presentation: Evolving/Changing  Clinical Presentation Rationale: based on medical and personal factors listed in PT evaluation  Clinical Decision Making (Complexity): Moderate complexity    PLAN OF CARE  Treatment Interventions:  Interventions: Manual Therapy, Neuromuscular Re-education, Therapeutic Activity, Therapeutic Exercise, Self-Care/Home Management    Long Term Goals     PT Goal 1  Goal Identifier: Goal 1  Goal Description: The patient will be able to run for 45 minutes with 0/10 pain in his penis in pursuit of a healthy, active lifestyle  Rationale: to maximize safety and independence with performance of ADLs and functional tasks;to maximize safety and independence within the home;to maximize safety and independence with self cares  Target Date: 07/24/24      Frequency of Treatment: 1 x per week  Duration of Treatment: 10 weeks    Education Assessment:   Learner/Method: Patient;Pictures/Video;No Barriers to Learning;Demonstration;Listening;Reading    Risks and benefits of evaluation/treatment have been explained.   Patient/Family/caregiver agrees with  Plan of Care.     Evaluation Time:     PT Eval, Low Complexity Minutes (92554): 10     Signing Clinician: TI Cordova [facility]                                                                                   OUTPATIENT PHYSICAL THERAPY      PLAN OF TREATMENT FOR OUTPATIENT REHABILITATION   Patient's Last Name, First Name, M.IBobby  Joseph Grissom  [place] YOB: 1955   Provider's Name   M [facility]   Medical Record No.  [id number]     Onset Date: 05/06/24  Start of Care Date: 05/15/24     Medical Diagnosis:  Pelvic Pain      PT Treatment Diagnosis:  Pelvic floor dysfunction Plan of Treatment  Frequency/Duration: 1 x per week/ 10 weeks    Certification date from 05/15/24 to 07/24/24         See note for plan of treatment details and functional goals     Su Austin PT                         I CERTIFY THE NEED FOR THESE SERVICES FURNISHED UNDER        THIS PLAN OF TREATMENT AND WHILE UNDER MY CARE     (Physician attestation of this document indicates review and certification of the therapy plan).              Referring Provider:  Eamon Cano    Initial Assessment  See Epic Evaluation- Start of Care Date: 05/15/24

## 2024-05-20 ENCOUNTER — THERAPY VISIT (OUTPATIENT)
Dept: PHYSICAL THERAPY | Facility: CLINIC | Age: 69
End: 2024-05-20
Attending: UROLOGY
Payer: MEDICARE

## 2024-05-20 DIAGNOSIS — R10.2 PELVIC PAIN IN MALE: Primary | ICD-10-CM

## 2024-05-20 PROCEDURE — 97140 MANUAL THERAPY 1/> REGIONS: CPT | Mod: GP

## 2024-05-20 PROCEDURE — 97116 GAIT TRAINING THERAPY: CPT | Mod: GP

## 2024-05-20 PROCEDURE — 97110 THERAPEUTIC EXERCISES: CPT | Mod: GP

## 2024-05-29 ENCOUNTER — THERAPY VISIT (OUTPATIENT)
Dept: PHYSICAL THERAPY | Facility: CLINIC | Age: 69
End: 2024-05-29
Attending: UROLOGY
Payer: MEDICARE

## 2024-05-29 DIAGNOSIS — R10.2 PELVIC PAIN IN MALE: Primary | ICD-10-CM

## 2024-05-29 PROCEDURE — 97110 THERAPEUTIC EXERCISES: CPT | Mod: GP

## 2024-05-29 NOTE — PROGRESS NOTES
05/29/24 0500   Appointment Info   Signing clinician's name / credentials Su Austin, PT, DPT   Total/Authorized Visits Eval and Treat   Visits Used 3   Medical Diagnosis Pelvic Pain   PT Tx Diagnosis Pelvic floor dysfunction   Quick Adds Certification;Pelvic Consent   Progress Note/Certification   Start of Care Date 05/15/24   Onset of illness/injury or Date of Surgery 05/06/24   Therapy Frequency 1 x per week   Predicted Duration 10 weeks   Certification date from 05/15/24   Certification date to 07/24/24   Progress Note Due Date 07/24/24   Progress Note Completed Date 05/15/24   PT Goal 1   Goal Identifier Goal 1   Goal Description The patient will be able to run for 45 minutes with 0/10 pain in his penis in pursuit of a healthy, active lifestyle   Rationale to maximize safety and independence with performance of ADLs and functional tasks;to maximize safety and independence within the home;to maximize safety and independence with self cares   Goal Progress Cutting out coffee has stopped symptoms.   Target Date 07/24/24   Date Met 05/29/24   Subjective Report   Subjective Report See eval   Treatment Interventions (PT)   Interventions Therapeutic Activity;Therapeutic Procedure/Exercise;Manual Therapy;Gait Training   Therapeutic Procedure/Exercise   Therapeutic Procedures Ther Proc 2;Ther Proc 3;Ther Proc 4;Ther Proc 5;Ther Proc 6   Ther Proc 1 Hip hinge   Ther Proc 1 - Details provoked penis pain on eccentric phase   Ther Proc 2 Pelvic bulge position   Ther Proc 2 - Details 2 min   Ther Proc 3 adductor lifts   Ther Proc 3 - Details 1 x 10 B, provoked mild penis pain   Ther Proc 4 hooklying contralateral ball press w/ TA   Ther Proc 4 - Details intense DA, intense penis pain   Ther Proc 5 JOSE standing   Ther Proc 5 - Details 1 x 20 B, no pain   Ther Proc 6 Hip hinge squat w/ Black Theraband   Ther Proc 6 - Details 1 x 20, no pain, added to HEP   PTRx Ther Proc 1 Education about d/c to HEP   PTRx Ther Proc 7  Pelvic Floor Bulge Position   PTRx Ther Proc 7 - Details No Notes   Skilled Intervention Verbal and tactile cueing for muscle engagement   Therapeutic Activity   Ther Act 1 Education about pelvic anatomy and physiology   Ther Act 1 - Details detailed discussion about pelvic ligaments, PFM, adductors, and abdominal muscles; diaphragm relationship to PFM   PTRx Ther Act 1 Return to Run Protocol   PTRx Ther Act 1 - Details No Notes   PTRx Ther Act 2 Posture Correction with Lumbar Roll   PTRx Ther Act 2 - Details No Notes   Skilled Intervention Patient education to improve understanding of potential pain drivers and reduce central sensitization   Neuromuscular Re-education   PTRx Neuro Re-ed 1 Balance Single Leg Stance Supported and Unsupported   PTRx Neuro Re-ed 1 - Details No Notes   Gait Training   Gait 1 Treadmill running gait analysis   Gait 1 - Details mild trendelenberg, anterior pelvic tilt. Uses bar for balance when tired   Skilled Intervention Gait analysis to decrease pelvic pain with running at home to promote health and actilvity   Manual Therapy   Manual Therapy Manual Therapy 2;Manual Therapy 3   Manual Therapy 1 Iliopsoas release   Manual Therapy 1 - Details 3 min bilateral w/ diaphragmatic breathing   Manual Therapy 2 STM and skin rolling to abdomen   Manual Therapy 2 - Details tender on TA   Manual Therapy 3 Education on use of massage ball for trigger point release at home   Manual Therapy 3 - Details verbalized understanding   Skilled Intervention Manual therapy to reduce pain and improve function   Education   Learner/Method Patient;Pictures/Video;No Barriers to Learning;Demonstration;Listening;Reading   Plan   Home program See PTRX   Plan for next session Core engagement during running       DISCHARGE  Reason for Discharge: Patient has met all goals.    Equipment Issued: n/a    Discharge Plan: Patient to continue home program.    Referring Provider:  Eamon Cano

## 2024-06-13 ENCOUNTER — THERAPY VISIT (OUTPATIENT)
Dept: PHYSICAL THERAPY | Facility: CLINIC | Age: 69
End: 2024-06-13
Payer: MEDICARE

## 2024-06-13 DIAGNOSIS — R10.2 PELVIC PAIN IN MALE: Primary | ICD-10-CM

## 2024-06-13 PROCEDURE — 97112 NEUROMUSCULAR REEDUCATION: CPT | Mod: GP

## 2024-06-13 PROCEDURE — 97530 THERAPEUTIC ACTIVITIES: CPT | Mod: GP

## 2024-06-13 NOTE — PROGRESS NOTES
06/13/24 0500   Appointment Info   Signing clinician's name / credentials Su Austin, PT, DPT   Total/Authorized Visits Eval and Treat   Visits Used 4   Medical Diagnosis Pelvic Pain   PT Tx Diagnosis Pelvic floor dysfunction   Quick Adds Certification;Pelvic Consent   Progress Note/Certification   Start of Care Date 05/15/24   Onset of illness/injury or Date of Surgery 05/06/24   Therapy Frequency 1 x per week   Predicted Duration 10 weeks   Certification date from 05/15/24   Certification date to 07/24/24   Progress Note Due Date 07/24/24   Progress Note Completed Date 05/15/24   PT Goal 1   Goal Identifier Goal 1   Goal Description The patient will be able to run for 45 minutes with 0/10 pain in his penis in pursuit of a healthy, active lifestyle   Rationale to maximize safety and independence with performance of ADLs and functional tasks;to maximize safety and independence within the home;to maximize safety and independence with self cares   Target Date 07/24/24   Subjective Report   Subjective Report Casselberry returns to PT. Pelvic pain has returned, despite cessation of caffeine usage.   Treatment Interventions (PT)   Interventions Therapeutic Activity;Therapeutic Procedure/Exercise;Manual Therapy;Gait Training;Neuromuscular Re-education   Therapeutic Procedure/Exercise   Therapeutic Procedures Ther Proc 2;Ther Proc 3;Ther Proc 4;Ther Proc 5;Ther Proc 6   Ther Proc 1 Hip hinge   Ther Proc 1 - Details provoked penis pain on eccentric phase   Ther Proc 2 Pelvic bulge position   Ther Proc 2 - Details 2 min   Ther Proc 3 adductor lifts   Ther Proc 3 - Details 1 x 10 B, provoked mild penis pain   Ther Proc 4 hooklying contralateral ball press w/ TA   Ther Proc 4 - Details intense DA, intense penis pain   Ther Proc 5 JOSE standing   Ther Proc 5 - Details 1 x 20 B, no pain   Ther Proc 6 Hip hinge squat w/ Black Theraband   Ther Proc 6 - Details 1 x 20, no pain, added to HEP   PTRx Ther Proc 1 Education about d/c to  HEP   PTRx Ther Proc 7 Pelvic Floor Bulge Position   PTRx Ther Proc 7 - Details No Notes   Skilled Intervention Verbal and tactile cueing for muscle engagement   Therapeutic Activity   Therapeutic Activities: dynamic activities to improve functional performance minutes (69236) 30   Ther Act 1 Review of hydration   Ther Act 1 - Details  oz of water per day, plus 2 unsweetened veggie drinks per day. When traveling, down to 64 oz per day. Since he's drinking more during the evening, he's then up every 0.5-1 hour to use the restroom. Usually up 4-5 times per night to use the restroom, 10-12 times during the day. When traveling, up 7 times per night, and going to the bathroom 10-12 times per day (2/2 bladder pain and irritation).  (No constipation; small urination at night (4 seconds per time combined with a strong urgency)   PTRx Ther Act 1 Education about bladder pain syndrome   PTRx Ther Act 1 - Details factors (central sensitization)   PTRx Ther Act 2 Posture Correction with Lumbar Roll   PTRx Ther Act 2 - Details DNP   Skilled Intervention Patient education to improve understanding of potential pain drivers and reduce central sensitization   Neuromuscular Re-education   PTRx Neuro Re-ed 1 toileting position   PTRx Neuro Re-ed 1 - Details sit to urinate for 2 mins to allow PFM to relax   Neuromuscular re-ed of mvmt, balance, coord, kinesthetic sense, posture, proprioception minutes (27100) 10   Neuro Re-ed 1 cued diaphragmic breathing and PFM contractions   Neuro Re-ed 1 - Details Initially verbalized that his pelvic floor felt relaxed when it was actually contracted (Per PT use of RUSI pelvic floor 2 view) and contracted when it wasa relaxed. Cued to focus on contract/relax w/ exhale/inhale   Skilled Intervention Verbal and tactile cueing  to improve patient awareness of engagement v relaxation of PFM   Gait Training   Gait 1 Treadmill running gait analysis   Gait 1 - Details mild trendelenberg, anterior  pelvic tilt. Uses bar for balance when tired   Skilled Intervention Gait analysis to decrease pelvic pain with running at home to promote health and actilvity   Manual Therapy   Manual Therapy Manual Therapy 2;Manual Therapy 3   Manual Therapy 1 Iliopsoas release   Manual Therapy 1 - Details 3 min bilateral w/ diaphragmatic breathing   Manual Therapy 2 STM and skin rolling to abdomen   Manual Therapy 2 - Details tender on TA   Manual Therapy 3 Education on use of massage ball for trigger point release at home   Manual Therapy 3 - Details verbalized understanding   Skilled Intervention Manual therapy to reduce pain and improve function   Education   Learner/Method Patient;Pictures/Video;No Barriers to Learning;Demonstration;Listening;Reading   Plan   Home program See PTRX   Plan for next session Core engagement during running   Total Session Time   Timed Code Treatment Minutes 40   Total Treatment Time (sum of timed and untimed services) 40       PLAN  Continue therapy per current plan of care.    Beginning/End Dates of Progress Note Reporting Period:  05/15/24 to 06/13/2024    Referring Provider:  Eamon Cano

## 2024-07-03 ENCOUNTER — THERAPY VISIT (OUTPATIENT)
Dept: PHYSICAL THERAPY | Facility: CLINIC | Age: 69
End: 2024-07-03
Payer: MEDICARE

## 2024-07-03 DIAGNOSIS — R10.2 PELVIC PAIN IN MALE: Primary | ICD-10-CM

## 2024-07-03 PROCEDURE — 97530 THERAPEUTIC ACTIVITIES: CPT | Mod: GP

## 2024-07-19 ENCOUNTER — THERAPY VISIT (OUTPATIENT)
Dept: PHYSICAL THERAPY | Facility: CLINIC | Age: 69
End: 2024-07-19
Payer: MEDICARE

## 2024-07-19 DIAGNOSIS — R10.2 PELVIC PAIN IN MALE: Primary | ICD-10-CM

## 2024-07-19 PROCEDURE — 97530 THERAPEUTIC ACTIVITIES: CPT | Mod: GP

## 2024-07-22 ENCOUNTER — TELEPHONE (OUTPATIENT)
Dept: UROLOGY | Facility: CLINIC | Age: 69
End: 2024-07-22
Payer: MEDICARE

## 2024-07-22 NOTE — TELEPHONE ENCOUNTER
[facility] Call Center    Phone Message    May a detailed message be left on voicemail: yes     Reason for Call: Other: pt is getting a referral from Tamara Austin PT for bladder botox injection. Pt needs a medicare procedure code. Please call pt to discuss. Thank you!     Action Taken: Message routed to:  [facility] Roseline WOODRUFF    Travel Screening: Not Applicable     Date of Service:

## 2024-07-24 ENCOUNTER — OFFICE VISIT (OUTPATIENT)
Dept: UROLOGY | Facility: CLINIC | Age: 69
End: 2024-07-24
Payer: MEDICARE

## 2024-07-24 VITALS — SYSTOLIC BLOOD PRESSURE: 146 MMHG | HEART RATE: 73 BPM | OXYGEN SATURATION: 97 % | DIASTOLIC BLOOD PRESSURE: 79 MMHG

## 2024-07-24 DIAGNOSIS — R10.2 PELVIC PAIN IN MALE: Primary | ICD-10-CM

## 2024-07-24 DIAGNOSIS — C61 PROSTATE CANCER (H): ICD-10-CM

## 2024-07-24 DIAGNOSIS — N32.81 OAB (OVERACTIVE BLADDER): ICD-10-CM

## 2024-07-24 DIAGNOSIS — N32.81 URGENCY-FREQUENCY SYNDROME: ICD-10-CM

## 2024-07-24 PROCEDURE — 99214 OFFICE O/P EST MOD 30 MIN: CPT | Performed by: UROLOGY

## 2024-07-24 RX ORDER — FESOTERODINE FUMARATE 4 MG/1
4 TABLET, FILM COATED, EXTENDED RELEASE ORAL DAILY
Qty: 30 TABLET | Refills: 3 | Status: SHIPPED | OUTPATIENT
Start: 2024-07-24 | End: 2024-08-06

## 2024-07-24 NOTE — LETTER
"7/24/2024       RE: Joseph Grissom  [address]  [place] [address]     Dear Colleague,    Thank you for referring your patient, Joseph Grissom, to the [facility]. Please see a copy of my visit note below.    July 24, 2024    Gisselle was seen today for other.    Diagnoses and all orders for this visit:    Pelvic pain in male  -     fesoterodine fumarate (TOVIAZ) 4 MG TB24 24 hr tablet; Take 1 tablet (4 mg) by mouth daily    Prostate cancer (H)  -     fesoterodine fumarate (TOVIAZ) 4 MG TB24 24 hr tablet; Take 1 tablet (4 mg) by mouth daily    Urgency-frequency syndrome  -     fesoterodine fumarate (TOVIAZ) 4 MG TB24 24 hr tablet; Take 1 tablet (4 mg) by mouth daily    OAB (overactive bladder)  -     fesoterodine fumarate (TOVIAZ) 4 MG TB24 24 hr tablet; Take 1 tablet (4 mg) by mouth daily    At this time we discussed that I do not think bladder botox would help his penile pain but rathter treat the urinary symptoms but discussed he has not tried medications for the OAB symptoms.  We discussed the difference between anticholinergics and beta 3 agonists will plan on a newer anticholinergic medication at this time.  If this is too expensive he will let us know and consider an oxybutynin trial.  Will send a one month supply to his local pharmacy and if working well he will let us know to send a 3 month suppley to his mail order pharmacy    Plan for him to see my colleague Dr Carrasco regarding the penile pain, will need PVR at that time as well    15 minutes were spent today on the day of the encounter in reviewing the EMR including Dr Cano's last note, direct patient care including prescription medications, coordination of care and documentation    Marii Kent MD MPH  (she/her/hers)   of Urology  [facility]    Subjective    Joseph \"Gisselle\" Jaciel is a 68 year old male, PMHx of " prostate cancer s/p androgen deprivation and radiation therapy, here for evaluation of penile pain and urinary urgency. He finished radiation therapy May 2020, and had more frequent urination and incontinence after. His incontinence resolved with PFPT. November 2023 he started to notice increase pain in the area of his penis. This pain is worse with running and improved with frequently sipping water throughout the day. Tylenol has no impact on his pain. He has been back in PFPT for this pain, and using compressive undergarments, neither of which have improved his pain.     Notes urinary urgency and dysuria. He urinates approximately 10 times during the day and 4 times at night. Drinks coffee in the morning, quit for a month to see if this would improve his pain - no difference. Denies any incontinence except when his bladder is full and he has no access to a restroom. Denies hematuria, fevers, chills, nausea, or vomiting.  Had cystoscopy with Dr Cano that was unremarkable 5/6/24 (note reviewed in Epic)    BP (!) 146/79   Pulse 73   SpO2 97%   GENERAL: healthy, alert and no distress  EYES: Eyes grossly normal to inspection, conjunctivae and sclerae normal  HENT: normal cephalic/atraumatic.  External ears, nose and mouth without ulcers or lesions.  RESP: no audible wheeze, cough, or visible cyanosis.  No visible retractions or increased work of breathing.  Able to speak fully in complete sentences.  NEURO: Cranial nerves grossly intact, mentation intact and speech normal  PSYCH: mentation appears normal, affect normal/bright, judgement and insight intact, normal speech and appearance well-groomed    CC  Patient Care Team:  Twin Holcomb DO as PCP - General (Family Practice)  Eamon Cano MD as MD (Urology)  Twin Holcomb DO as Assigned PCP  Eamon Cano MD as Assigned Surgical Provider

## 2024-07-24 NOTE — PATIENT INSTRUCTIONS
Take the medication as directed    [facility]  [address]  [phone]    It was a pleasure meeting with you today.  Thank you for allowing me and my team the privilege of caring for you today.  YOU are the reason we are here, and I truly hope we provided you with the excellent service you deserve.  Please let us know if there is anything else we can do for you so that we can be sure you are leaving completely satisfied with your care experience.

## 2024-07-24 NOTE — PROGRESS NOTES
"July 24, 2024    Gisselle was seen today for other.    Diagnoses and all orders for this visit:    Pelvic pain in male  -     fesoterodine fumarate (TOVIAZ) 4 MG TB24 24 hr tablet; Take 1 tablet (4 mg) by mouth daily    Prostate cancer (H)  -     fesoterodine fumarate (TOVIAZ) 4 MG TB24 24 hr tablet; Take 1 tablet (4 mg) by mouth daily    Urgency-frequency syndrome  -     fesoterodine fumarate (TOVIAZ) 4 MG TB24 24 hr tablet; Take 1 tablet (4 mg) by mouth daily    OAB (overactive bladder)  -     fesoterodine fumarate (TOVIAZ) 4 MG TB24 24 hr tablet; Take 1 tablet (4 mg) by mouth daily    At this time we discussed that I do not think bladder botox would help his penile pain but rathter treat the urinary symptoms but discussed he has not tried medications for the OAB symptoms.  We discussed the difference between anticholinergics and beta 3 agonists will plan on a newer anticholinergic medication at this time.  If this is too expensive he will let us know and consider an oxybutynin trial.  Will send a one month supply to his local pharmacy and if working well he will let us know to send a 3 month suppley to his mail order pharmacy    Attestation:  I agree with the PFSH and ROS as completed by the MS, except for changes made as needed.  The remainder of the encounter was performed by me and scribed by the MS.  The scribed note accurately reflects my personal services and the decisions made by me.    Plan for him to see my colleague Dr Carrasco regarding the penile pain, will need PVR at that time as well    15 minutes were spent today on the day of the encounter in reviewing the EMR including Dr Cano's last note, direct patient care including prescription medications, coordination of care and documentation    Marii Kent MD MPH  (she/her/hers)   of Urology  [facility]    Subjective    Joseph \"Gisselle\" Jaciel is a 68 year old male, PMHx of prostate cancer s/p androgen deprivation and " radiation therapy, here for evaluation of penile pain and urinary urgency. He finished radiation therapy May 2020, and had more frequent urination and incontinence after. His incontinence resolved with PFPT. November 2023 he started to notice increase pain in the area of his penis. This pain is worse with running and improved with frequently sipping water throughout the day. Tylenol has no impact on his pain. He has been back in PFPT for this pain, and using compressive undergarments, neither of which have improved his pain.     Notes urinary urgency and dysuria. He urinates approximately 10 times during the day and 4 times at night. Drinks coffee in the morning, quit for a month to see if this would improve his pain - no difference. Denies any incontinence except when his bladder is full and he has no access to a restroom. Denies hematuria, fevers, chills, nausea, or vomiting.  Had cystoscopy with Dr Cano that was unremarkable 5/6/24 (note reviewed in Epic)    BP (!) 146/79   Pulse 73   SpO2 97%   GENERAL: healthy, alert and no distress  EYES: Eyes grossly normal to inspection, conjunctivae and sclerae normal  HENT: normal cephalic/atraumatic.  External ears, nose and mouth without ulcers or lesions.  RESP: no audible wheeze, cough, or visible cyanosis.  No visible retractions or increased work of breathing.  Able to speak fully in complete sentences.  NEURO: Cranial nerves grossly intact, mentation intact and speech normal  PSYCH: mentation appears normal, affect normal/bright, judgement and insight intact, normal speech and appearance well-groomed    CC  Patient Care Team:  Twin Holcomb DO as PCP - General (Family Practice)  Eamon Cano MD as MD (Urology)  Twin Holcomb DO as Assigned PCP  Eamon Cano MD as Assigned Surgical Provider

## 2024-07-30 ENCOUNTER — MYC MEDICAL ADVICE (OUTPATIENT)
Dept: FAMILY MEDICINE | Facility: CLINIC | Age: 69
End: 2024-07-30
Payer: MEDICARE

## 2024-07-30 NOTE — TELEPHONE ENCOUNTER
My chart message sent     Oumou Laurent RN, BSN  [facility]

## 2024-07-31 ENCOUNTER — OFFICE VISIT (OUTPATIENT)
Dept: FAMILY MEDICINE | Facility: CLINIC | Age: 69
End: 2024-07-31
Payer: MEDICARE

## 2024-07-31 ENCOUNTER — MYC MEDICAL ADVICE (OUTPATIENT)
Dept: FAMILY MEDICINE | Facility: CLINIC | Age: 69
End: 2024-07-31

## 2024-07-31 VITALS
DIASTOLIC BLOOD PRESSURE: 75 MMHG | SYSTOLIC BLOOD PRESSURE: 125 MMHG | TEMPERATURE: 97.5 F | BODY MASS INDEX: 26.36 KG/M2 | OXYGEN SATURATION: 97 % | RESPIRATION RATE: 18 BRPM | WEIGHT: 188.3 LBS | HEART RATE: 70 BPM | HEIGHT: 71 IN

## 2024-07-31 DIAGNOSIS — Z71.84 TRAVEL ADVICE ENCOUNTER: Primary | ICD-10-CM

## 2024-07-31 PROCEDURE — 99403 PREV MED CNSL INDIV APPRX 45: CPT | Mod: GA | Performed by: NURSE PRACTITIONER

## 2024-07-31 RX ORDER — AZITHROMYCIN 500 MG/1
500 TABLET, FILM COATED ORAL DAILY
Qty: 3 TABLET | Refills: 0 | Status: SHIPPED | OUTPATIENT
Start: 2024-07-31 | End: 2024-08-03

## 2024-07-31 ASSESSMENT — PAIN SCALES - GENERAL: PAINLEVEL: NO PAIN (1)

## 2024-07-31 NOTE — PROGRESS NOTES
"Nurse Note ( Pre-Travel Consult)    Itinerary:  [place]    Departure Date: 8-22-24    Return Date: 9-17-24    Length of Trip 1 month    Reason for Travel: Business         Urban or rural: urban    Accommodations: Volunteer/Missionary accommodations - [facility]        IMMUNIZATION HISTORY  Have you received any immunizations within the past 4 weeks?  No  Have you ever fainted from having your blood drawn or from an injection?  No  Have you ever had a fever reaction to vaccination?  No  Have you ever had any bad reaction or side effect from any vaccination?  No  Have you ever had hepatitis A or B vaccine?  No  Do you live (or work closely) with anyone who has AIDS, an AIDS-like condition, any other immune disorder or who is on chemotherapy for cancer?  No  Do you have a family history of immunodeficiency?  No  Have you received any injection of immune globulin or any blood products during the past 12 months?  No    Patient roomed by Valerie Khalil RN      Subjective  Joseph Grissom is a 68 year old male seen today alone for counsultation for international travel.   Patient will be departing in  3 week(s) and  traveling alone.  Meeting others     Patient itinerary :  will be in the [place] [facility] [place] [facility]  which risk for Dengue Fever, [place], food borne illnesses, motor vehicle accidents, and Typhoid. exposure.      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    Patient's country of birth is [place]    Special medical concerns: urinary concerns   Radiation in 2020  Pre-travel questionnaire was completed by patient and reviewed by provider.     Vitals: /75   Pulse 70   Temp 97.5  F (36.4  C) (Temporal)   Resp 18   Ht 1.803 m (5' 11\")   Wt 85.4 kg (188 lb 4.8 oz)   SpO2 97%   BMI 26.26 kg/m    BMI= Body mass index is 26.26 kg/m .    EXAM:  General:  Well-nourished, well-developed in no acute distress.  Appears to be stated age, interacts appropriately and expresses understanding of " information given to patient.    Current Outpatient Medications   Medication Sig Dispense Refill    atorvastatin (LIPITOR) 20 MG tablet TAKE 1 TABLET EVERY DAY 90 tablet 2    Black Cohosh 80 MG CAPS       Diphenhydramine-APAP 12.5-500 MG TABS       fesoterodine fumarate (TOVIAZ) 4 MG TB24 24 hr tablet Take 1 tablet (4 mg) by mouth daily 30 tablet 3    fexofenadine (ALLEGRA) 180 MG tablet Take 180 mg by mouth daily      Multiple Vitamins-Minerals (MULTIVITAMIN ADULT PO)       naphazoline-pheniramine (OPCON-A/VISINE A/NAPHCON A) SOLN ophthalmic solution 1-2 drops 4 times daily as needed for irritation      pantothenic acid 500 MG TABS Take 500 mg by mouth      Triamcinolone Acetonide (NASACORT ALLERGY 24HR NA)       polyethylene glycol (GOLYTELY) 236 g suspension The night before the exam at 6 pm drink an 8-ounce glass every 15 minutes until the jug is half empty. If you arrive before 11 AM: Drink the other half of the Golytely jug at 11 PM night before procedure. If you arrive after 11 AM: Drink the other half of the Golytely jug at 6 AM day of procedure. For additional instructions refer to your colonoscopy prep instructions. (Patient not taking: Reported on 7/31/2024) 4000 mL 0     Patient Active Problem List   Diagnosis    Prostate cancer (H)    Pelvic pain in male     Allergies   Allergen Reactions    Eggs [Chicken-Derived Products (Egg)]     Penicillins          Immunizations discussed include:   Covid 19: vaccine is not available  Hepatitis A:  get on pharmacy  Hepatitis B: get [facility]   Influenza: vaccine is not available  Typhoid: Ordered/given today, risks, benefits and side effects reviewed  Rabies: Up to date has had Post exposure Prophylaxis  Yellow Fever: Not indicated  Japanese Encephalitis: Declined  Not concerned about risk of disease  Meningococcus: Not indicated  Tetanus/Diphtheria: will get at Pharmacy  Measles/Mumps/Rubella: Immune by disease history per patient report  Cholera: Not  needed  Polio: Not indicated  Pneumococcal: Up to date  Varicella: Immune by disease history per patient report  Shingrix: Up to date  HPV:  Not indicated     TB: low risk     Altitude Exposure on this trip: no  Past tolerance to Altitude: na    ASSESSMENT/PLAN:  Gisselle was seen today for travel clinic.    Diagnoses and all orders for this visit:    Travel advice encounter  -     typhoid (VIVOTIF) CR capsule; Take 1 capsule by mouth every other day  -     azithromycin (ZITHROMAX) 500 MG tablet; Take 1 tablet (500 mg) by mouth daily for 3 doses Take 1 tablet a day for up to 3 days for severe diarrhea      I have reviewed general recommendations for safe travel   including: food/water precautions, insect precautions, roadway safety. Educational materials and [employer] report provided.    [place]ia prophylaxis recommended: none  Symptomatic treatment for traveler's diarrhea: azithromycin  Altitude illness prevention and treatment: none      Evacuation insurance advised and resources were provided to patient.    Total visit time 45 minutes  with over 50% of time spent counseling patient and shared decision making as detailed above.    Thi Crawley CNP  Certificate in Travel Health

## 2024-07-31 NOTE — TELEPHONE ENCOUNTER
Routed to Thi Crawley    See pt [email or web address] message    Chantel Milton, RN   [facility]

## 2024-07-31 NOTE — PATIENT INSTRUCTIONS
Thank you for visiting the [facility] : [phone]  Today July 31, 2024 you received the    Typhoid - Oral. This prescription has been faxed to your pharmacy, please take as directed.     Get Hep A and Hepatitis B and Tdap at pharmacy    Upload Rabies and Yellow Fever documents     Follow up vaccine appointments can be made as a NURSE ONLY visit at the Travel Clinic, (BE PREPARED TO WAIT, ) or at designated [facility].    If you are receiving the Rabies vaccines series, it is important that you follow the exact schedule ordered.     Pre-travel     We recommend that you purchase Medical Evacuation Insurance prior to your departure.  Https://wwwnc.cdc.gov/travel/page/insurance    [place] your travel plans with the [place] Department of State through STEP ( Smart Traveler Enrollment Program ) https://step.state.gov.  STEP is a free service to allow U[place] citizens and nationals traveling and living abroad to enroll their trip with the nearest [place] Embassy or Consulate.    Animal Exposure: Avoid all mammals even if they look healthy.  If there is a bite, scratch or even a lick, wash area immediately with soap and water for 15 minutes and seek medical care within 24 hours for evaluation of Rabies post exposure treatment.  Contact your Medical Evacuation Insurance.    Repiratory illness prevention strategies ( Covid and Influenza ) CDC recommendations:  Consider wearing a mask in crowded or poorly ventilated indoor areas, including on public transportation and in transportation hubs.  Hand washing: frequent, thorough handwashing with soap and water for 20 seconds. Use an alcohol-based hand  with at least 60% alcohol if soap and water are not readily available. Avoid touching face, nose, eyes, mouth unless you have done appropriate hand washing as above.  VACCINES: Staying up to date on COVID-19 vaccines significantly lowers the risk of getting very sick, being  hospitalized, or dying from COVID-19. CDC recommends that everyone stay up to date on their COVID-19 vaccines, especially people with weakened immune systems.    Travel Covid 19 Testing:  updated 12/06/2021  International travelers: Pre-travel:  See country specific Embassy websites or airline websites.    Post travel: CDC recommends getting tested 3-5 days after your trip     Post-travel illness:  Contact your provider or [facility] if you develop a fever, rash, cough, diarrhea or other symptoms for up to 1 year after travel.  Inform your healthcare provider when and where you traveled to.    Please call the [facility] with any questions [phone]  Or send your provider a 'My Chart' note.           Hydroxyzine Pregnancy And Lactation Text: This medication is not safe during pregnancy and should not be taken. It is also excreted in breast milk and breast feeding isn't recommended.

## 2024-08-06 DIAGNOSIS — N32.81 OAB (OVERACTIVE BLADDER): Primary | ICD-10-CM

## 2024-08-06 RX ORDER — SOLIFENACIN SUCCINATE 5 MG/1
5 TABLET, FILM COATED ORAL DAILY
Qty: 30 TABLET | Refills: 3 | Status: SHIPPED | OUTPATIENT
Start: 2024-08-06 | End: 2024-09-07

## 2024-08-07 ENCOUNTER — TELEPHONE (OUTPATIENT)
Dept: UROLOGY | Facility: CLINIC | Age: 69
End: 2024-08-07
Payer: MEDICARE

## 2024-08-08 NOTE — TELEPHONE ENCOUNTER
Prior Authorization Approval    Medication: SOLIFENACIN SUCCINATE 5 MG PO TABS  Authorization Effective Date: 8/8/2024  Authorization Expiration Date: 12/31/2024  Approved Dose/Quantity:   Reference #:     Insurance Company: [employer] - Phone [phone] Fax [phone]  Expected CoPay: $    CoPay Card Available:      Financial Assistance Needed:   Which Pharmacy is filling the prescription: [facility] [address] [place], [place] - [address]  Pharmacy Notified: Yes    Patient Notified: No

## 2024-08-08 NOTE — TELEPHONE ENCOUNTER
Central Prior Authorization Team  Phone: [phone]    [place] Initiation    Medication: SOLIFENACIN SUCCINATE 5 MG PO TABS  Insurance Company: [employer] - Phone [phone] Fax [phone]  Pharmacy Filling the Rx: [facility] [address] [place], [place] [address], [place] - [address]  Filling Pharmacy Phone: [phone]  Filling Pharmacy Fax: [phone]  Start Date: 8/8/2024

## 2024-08-13 RX ORDER — FESOTERODINE FUMARATE 4 MG/1
TABLET, FILM COATED, EXTENDED RELEASE ORAL
Refills: 0 | OUTPATIENT
Start: 2024-08-13

## 2024-08-16 ENCOUNTER — PRE VISIT (OUTPATIENT)
Dept: UROLOGY | Facility: CLINIC | Age: 69
End: 2024-08-16
Payer: MEDICARE

## 2024-08-16 NOTE — TELEPHONE ENCOUNTER
Reason for visit: Penile pain     Relevant information: some hx of pelvic pain in male, OAB    Records/imaging/labs/orders: all records available    Pt called: no need for a call    At Rooming:  Standard rooming      Bay Mitchell  8/16/2024  12:51 PM

## 2024-09-07 ENCOUNTER — MYC REFILL (OUTPATIENT)
Dept: UROLOGY | Facility: CLINIC | Age: 69
End: 2024-09-07
Payer: MEDICARE

## 2024-09-07 DIAGNOSIS — N32.81 OAB (OVERACTIVE BLADDER): ICD-10-CM

## 2024-09-12 DIAGNOSIS — N32.81 OAB (OVERACTIVE BLADDER): ICD-10-CM

## 2024-09-12 RX ORDER — SOLIFENACIN SUCCINATE 5 MG/1
5 TABLET, FILM COATED ORAL DAILY
Qty: 30 TABLET | Refills: 2 | Status: SHIPPED | OUTPATIENT
Start: 2024-09-12 | End: 2024-09-12

## 2024-09-12 RX ORDER — SOLIFENACIN SUCCINATE 5 MG/1
5 TABLET, FILM COATED ORAL DAILY
Qty: 30 TABLET | Refills: 2 | Status: SHIPPED | OUTPATIENT
Start: 2024-09-12

## 2024-09-26 ENCOUNTER — LAB (OUTPATIENT)
Dept: LAB | Facility: CLINIC | Age: 69
End: 2024-09-26
Payer: MEDICARE

## 2024-09-26 ENCOUNTER — OFFICE VISIT (OUTPATIENT)
Dept: UROLOGY | Facility: CLINIC | Age: 69
End: 2024-09-26
Payer: MEDICARE

## 2024-09-26 VITALS
DIASTOLIC BLOOD PRESSURE: 81 MMHG | HEIGHT: 72 IN | OXYGEN SATURATION: 97 % | WEIGHT: 193 LBS | SYSTOLIC BLOOD PRESSURE: 178 MMHG | HEART RATE: 68 BPM | BODY MASS INDEX: 26.14 KG/M2

## 2024-09-26 DIAGNOSIS — Z92.3 HISTORY OF RADIATION THERAPY: ICD-10-CM

## 2024-09-26 DIAGNOSIS — C61 PROSTATE CANCER (H): ICD-10-CM

## 2024-09-26 DIAGNOSIS — N32.81 OAB (OVERACTIVE BLADDER): ICD-10-CM

## 2024-09-26 DIAGNOSIS — N32.81 OAB (OVERACTIVE BLADDER): Primary | ICD-10-CM

## 2024-09-26 DIAGNOSIS — R35.0 INCREASED FREQUENCY OF URINATION: ICD-10-CM

## 2024-09-26 PROCEDURE — 36415 COLL VENOUS BLD VENIPUNCTURE: CPT | Performed by: PATHOLOGY

## 2024-09-26 PROCEDURE — 99000 SPECIMEN HANDLING OFFICE-LAB: CPT | Performed by: PATHOLOGY

## 2024-09-26 PROCEDURE — 99214 OFFICE O/P EST MOD 30 MIN: CPT | Mod: 25 | Performed by: UROLOGY

## 2024-09-26 PROCEDURE — 51798 US URINE CAPACITY MEASURE: CPT | Performed by: UROLOGY

## 2024-09-26 PROCEDURE — 84403 ASSAY OF TOTAL TESTOSTERONE: CPT | Performed by: UROLOGY

## 2024-09-26 ASSESSMENT — PAIN SCALES - GENERAL: PAINLEVEL: EXTREME PAIN (8)

## 2024-09-26 NOTE — PROGRESS NOTES
"HPI:  Joseph Grissom is a 69 year old male being seen for follow-up penile pain.  Recent pt of Dr. Kent in this clinic.  Had cystoscopy with Dr Cano that was unremarkable 24 (note reviewed in Epic)     PMHx of prostate cancer s/p androgen deprivation and radiation therapy, here for evaluation of penile pain and urinary urgency. He finished radiation therapy May 2020, and had more frequent urination and incontinence after.  His incontinence resolved with PFPT. 2023 he started to notice increased pain in the area of his penis. This pain is worse with running and reliably improved by constantly sipping water throughout the day. Tylenol has no impact on his pain. He has been seen by PFPT for this pain, and using compressive undergarments, neither of which have improved his pain.     Exam:  BP (!) 178/81   Pulse 68   Ht 1.816 m (5' 11.5\")   Wt 87.5 kg (193 lb)   SpO2 97%   BMI 26.54 kg/m      General: Alert, oriented, nad.  Pleasant and conversant.  Eyes: anicteric, EOMI.  Pulse: regular  Resps: normal, non-labored.  Abdomen:  Nondistended.  Neurological - no tremors  Skin - no discoloration/ lesions noted   exam   Phallus normal  Testes ++, anodular, nontender.  Vas and epididymis present and normal bilaterally  Cord structures not remarkable.   NEENA deferred.     Review of Imagin19 negative bone scan.  19 CT abdomen/pelvis showed left superior pubic ramus sclerotic lesion- not apparent on bone scan.  10/20/19 prostate MRI was PIRADS 5, highly suspicious for prostate cancer.       Review of Labs:  The following labs were reviewed by me    Lab Results   Component Value Date    PSA 0.04 2024    PSA 0.05 2023    PSA 0.04 2023    PSA <0.01 2022    PSA <0.01 2022    PSA 0.01 2021    PSA 0.01 2021    PSA 0.05 2020    PSA 0.09 2020    PSA 9.02 10/14/2019    PSA 6.77 2019          Assessment & Plan   History of prostate cancer, " status-post EBRT and androgen ablation.  PSA is low.  Monitor at the very least every year  Check testosterone level to put PSA level in context  Chronic penile/genital pain.  Etiology uncertain, but appears to be either neurogenic or musculoskeletal.  Suspect irritation to the nerves from history of prostate radiotherapy.  Has done pelvic floor physical therapy and other conservative measures without help.  Referral to [facility] pain clinic placed  Overactive bladder symptoms.  Dr. Kent recommended anticholinergic medication, newer agent.  He tried this and it did not help reduce his urinary frequency symptoms.  Discussed symptoms could be overactive bladder, or perhaps a small noncompliant bladder from history of radiation therapy to the pelvis.  UDS would help sort this out.  Urodynamic study ordered, follow-up with Dr. Kent virtual or in person to review results.  PVR 86cc today.      Anand Carrasco MD  [facility] [place]    ==========================  Additional Coding Information:    Problems:  4 -- one or more chronic illnesses with exacerbation or side effects    Data Reviewed  3 or more studies reviewed, as listed above     Tests ordered: Testosterone    Level of risk:  3 -- low risk (e.g., OTC medication or observation, minor surgery without risks)    Time spent:  24 minutes spent by me on the date of the encounter doing chart review, history and exam, documentation and further activities per the note

## 2024-09-26 NOTE — NURSING NOTE
"Chief Complaint   Patient presents with    Consult     Pt is here for OAB and penile pain     Onset was last decemeber. Pt was running when this started, was doing 6 miles in a hour. pt states that he controls pain with liquid, when he doesnt drink, then he has pain in the penis, cant locate the pain. what helps relive some pain is putting pressure on the bladder, has to sit down to urinate in order to not have as much pain.  Had prostate radiation (39 treatments in ) Was in PFPT for a little but found no success.    Blood pressure (!) 178/81, pulse 68, height 1.816 m (5' 11.5\"), weight 87.5 kg (193 lb), SpO2 97%. Body mass index is 26.54 kg/m .    Patient Active Problem List   Diagnosis    Prostate cancer (H)    Pelvic pain in male       Allergies   Allergen Reactions    Eggs [Chicken-Derived Products (Egg)]     Penicillins        Current Outpatient Medications   Medication Sig Dispense Refill    atorvastatin (LIPITOR) 20 MG tablet TAKE 1 TABLET EVERY DAY 90 tablet 2    Diphenhydramine-APAP 12.5-500 MG TABS       fexofenadine (ALLEGRA) 180 MG tablet Take 180 mg by mouth daily      naphazoline-pheniramine (OPCON-A/VISINE A/NAPHCON A) SOLN ophthalmic solution 1-2 drops 4 times daily as needed for irritation      pantothenic acid 500 MG TABS Take 500 mg by mouth      solifenacin (VESICARE) 5 MG tablet Take 1 tablet (5 mg) by mouth daily. 30 tablet 2    Triamcinolone Acetonide (NASACORT ALLERGY 24HR NA)       typhoid (VIVOTIF) CR capsule Take 1 capsule by mouth every other day 4 capsule 0       Social History     Tobacco Use    Smoking status: Former     Current packs/day: 0.00     Average packs/day: 0.5 packs/day for 3.2 years (1.6 ttl pk-yrs)     Types: Cigarettes     Start date: 1962     Quit date: 1965     Years since quittin.1    Smokeless tobacco: Never    Tobacco comments:     I was a 9-?12   Vaping Use    Vaping status: Never Used   Substance Use Topics    Alcohol use: Yes     Comment: I have " a drink once about every other month, wine or beer    Drug use: Not Currently     Comment: I want to drink or try ?CBC products more often lately, but       Bay Mitchell  9/26/2024  10:36 AM

## 2024-09-26 NOTE — PROGRESS NOTES
"  He is off Lupron at this time.  PVR 86cc.    Testosterone level today, put the PSA in context    Lab Results   Component Value Date    PSA 0.04 03/27/2024    PSA 0.05 12/29/2023    PSA 0.04 06/30/2023    PSA <0.01 12/02/2022    PSA <0.01 07/07/2022    PSA 0.01 12/30/2021    PSA 0.01 06/23/2021    PSA 0.05 12/22/2020    PSA 0.09 06/19/2020    PSA 9.02 10/14/2019    PSA 6.77 08/13/2019          Penile pain started Dec 2023  Starting during a run.  Pain is better if sipping liquids.  Morning coffee relieves the discomfort, for example.  Sips liquid all the time.    Exacerbating/relieving factors-  Less with liquids  Less with motorcycle  Less with pressure on the penis area.  Worse since Dec 2023.  Sips all night long.      Physical Exam  BP (!) 178/81   Pulse 68   Ht 1.816 m (5' 11.5\")   Wt 87.5 kg (193 lb)   SpO2 97%   BMI 26.54 kg/m      General: Alert, oriented, nad.  Pleasant and conversant.  Eyes: anicteric, EOMI.  Pulse: regular  Resps: normal, non-labored.  Abdomen:  Nondistended.  Neurological - no tremors  Skin - no discoloration/ lesions noted   exam   Phallus circumcised  Testes ++, anodular, nontender.  Vas and epididymis present and normal bilaterally  Cord structures not remarkable..  NEENA - soft, anodular, nontender.  Prostate feels small. Flat, nontender.      "

## 2024-09-26 NOTE — PATIENT INSTRUCTIONS
Thank you for visiting with Dr. Carrasco today.  The following has been recommended for you based on the results of your appointment:     - Please have your blood drawn for testing at your convenience   -A pain clinic referral has been placed for you.  You should expect a call in the next 2 business days.  If you do not hear from them within 2 business days, please call [phone] to be scheduled.   -Please schedule the next available Urodynamics testing and a follow-up with Dr. Kent before you leave today.    Please reach out to us with any questions!  [phone]    Thank you,  Katerin Gomez LPN

## 2024-09-26 NOTE — LETTER
"2024       RE: Joseph Grissom  [address]  [place] [address]     Dear Colleague,    Thank you for referring your patient, Joseph Grissom, to the [facility] [place] at [facility]. Please see a copy of my visit note below.    HPI:  Joseph Grissom is a 69 year old male being seen for follow-up penile pain.  Recent pt of Dr. Kent in this clinic.  Had cystoscopy with Dr Cano that was unremarkable 24 (note reviewed in Epic)     PMHx of prostate cancer s/p androgen deprivation and radiation therapy, here for evaluation of penile pain and urinary urgency. He finished radiation therapy May 2020, and had more frequent urination and incontinence after.  His incontinence resolved with PFPT. 2023 he started to notice increased pain in the area of his penis. This pain is worse with running and reliably improved by constantly sipping water throughout the day. Tylenol has no impact on his pain. He has been seen by PFPT for this pain, and using compressive undergarments, neither of which have improved his pain.     Exam:  BP (!) 178/81   Pulse 68   Ht 1.816 m (5' 11.5\")   Wt 87.5 kg (193 lb)   SpO2 97%   BMI 26.54 kg/m      General: Alert, oriented, nad.  Pleasant and conversant.  Eyes: anicteric, EOMI.  Pulse: regular  Resps: normal, non-labored.  Abdomen:  Nondistended.  Neurological - no tremors  Skin - no discoloration/ lesions noted   exam   Phallus normal  Testes ++, anodular, nontender.  Vas and epididymis present and normal bilaterally  Cord structures not remarkable.   NEENA deferred.     Review of Imagin19 negative bone scan.  19 CT abdomen/pelvis showed left superior pubic ramus sclerotic lesion- not apparent on bone scan.  10/20/19 prostate MRI was PIRADS 5, highly suspicious for prostate cancer.       Review of Labs:  The following labs were reviewed by me    Lab Results   Component " Value Date    PSA 0.04 03/27/2024    PSA 0.05 12/29/2023    PSA 0.04 06/30/2023    PSA <0.01 12/02/2022    PSA <0.01 07/07/2022    PSA 0.01 12/30/2021    PSA 0.01 06/23/2021    PSA 0.05 12/22/2020    PSA 0.09 06/19/2020    PSA 9.02 10/14/2019    PSA 6.77 08/13/2019          Assessment & Plan  History of prostate cancer, status-post EBRT and androgen ablation.  PSA is low.  Monitor at the very least every year  Check testosterone level to put PSA level in context  Chronic penile/genital pain.  Etiology uncertain, but appears to be either neurogenic or musculoskeletal.  Suspect irritation to the nerves from history of prostate radiotherapy.  Has done pelvic floor physical therapy and other conservative measures without help.  Referral to [facility] pain clinic placed  Overactive bladder symptoms.  Dr. Kent recommended anticholinergic medication, newer agent.  He tried this and it did not help reduce his urinary frequency symptoms.  Discussed symptoms could be overactive bladder, or perhaps a small noncompliant bladder from history of radiation therapy to the pelvis.  UDS would help sort this out.  Urodynamic study ordered, follow-up with Dr. Kent virtual or in person to review results.  PVR 86cc today.      Anand Carrasco MD  [facility] [place]    ==========================  Additional Coding Information:    Problems:  4 -- one or more chronic illnesses with exacerbation or side effects    Data Reviewed  3 or more studies reviewed, as listed above     Tests ordered: Testosterone    Level of risk:  3 -- low risk (e.g., OTC medication or observation, minor surgery without risks)    Time spent:  24 minutes spent by me on the date of the encounter doing chart review, history and exam, documentation and further activities per the note                He is off Lupron at this time.  PVR 86cc.    Testosterone level today, put the PSA in context    Lab Results   Component Value Date    PSA 0.04 03/27/2024     "PSA 0.05 12/29/2023    PSA 0.04 06/30/2023    PSA <0.01 12/02/2022    PSA <0.01 07/07/2022    PSA 0.01 12/30/2021    PSA 0.01 06/23/2021    PSA 0.05 12/22/2020    PSA 0.09 06/19/2020    PSA 9.02 10/14/2019    PSA 6.77 08/13/2019          Penile pain started Dec 2023  Starting during a run.  Pain is better if sipping liquids.  Morning coffee relieves the discomfort, for example.  Sips liquid all the time.    Exacerbating/relieving factors-  Less with liquids  Less with motorcycle  Less with pressure on the penis area.  Worse since Dec 2023.  Sips all night long.      Physical Exam  BP (!) 178/81   Pulse 68   Ht 1.816 m (5' 11.5\")   Wt 87.5 kg (193 lb)   SpO2 97%   BMI 26.54 kg/m      General: Alert, oriented, nad.  Pleasant and conversant.  Eyes: anicteric, EOMI.  Pulse: regular  Resps: normal, non-labored.  Abdomen:  Nondistended.  Neurological - no tremors  Skin - no discoloration/ lesions noted   exam   Phallus circumcised  Testes ++, anodular, nontender.  Vas and epididymis present and normal bilaterally  Cord structures not remarkable..  NEENA - soft, anodular, nontender.  Prostate feels small. Flat, nontender.        Again, thank you for allowing me to participate in the care of your patient.      Sincerely,    nAand Carrasco MD    "

## 2024-09-27 ENCOUNTER — MYC MEDICAL ADVICE (OUTPATIENT)
Dept: UROLOGY | Facility: CLINIC | Age: 69
End: 2024-09-27
Payer: MEDICARE

## 2024-09-30 LAB — TESTOST SERPL-MCNC: 303 NG/DL (ref 240–950)

## 2024-10-02 ENCOUNTER — TRANSFERRED RECORDS (OUTPATIENT)
Dept: HEALTH INFORMATION MANAGEMENT | Facility: CLINIC | Age: 69
End: 2024-10-02
Payer: MEDICARE

## 2024-10-03 ENCOUNTER — TRANSFERRED RECORDS (OUTPATIENT)
Dept: HEALTH INFORMATION MANAGEMENT | Facility: CLINIC | Age: 69
End: 2024-10-03
Payer: MEDICARE

## 2024-10-08 NOTE — TELEPHONE ENCOUNTER
Left Voicemail (1st Attempt) for the patient to call back and schedule the following:    Appointment type: return patient; okay to use new pt spot  Provider: Dr. Stewart  Return date: next available  Specialty phone number: [phone]

## 2024-10-17 NOTE — TELEPHONE ENCOUNTER
Left Voicemail (2nd Attempt) for the patient to call back and schedule the following:     Appointment type: return patient; okay to use new pt spot  Provider: Dr. Stewart  Return date: next available  Specialty phone number: [phone]

## 2024-12-04 ENCOUNTER — PATIENT OUTREACH (OUTPATIENT)
Dept: CARE COORDINATION | Facility: CLINIC | Age: 69
End: 2024-12-04
Payer: MEDICARE

## 2024-12-09 DIAGNOSIS — Z91.89 RISK OF MYOCARDIAL INFARCTION OR STROKE 7.5% OR GREATER IN NEXT 10 YEARS: ICD-10-CM

## 2024-12-09 RX ORDER — ATORVASTATIN CALCIUM 20 MG/1
20 TABLET, FILM COATED ORAL DAILY
Qty: 90 TABLET | Refills: 1 | Status: SHIPPED | OUTPATIENT
Start: 2024-12-09

## 2024-12-27 NOTE — PROGRESS NOTES
PREPROCEDURE DIAGNOSES:    1. Urinary frequency/urgency that has not responded to Toviaz  2. History of prostate cancer s/p radiation    POSTPROCEDURE DIAGNOSES:  -Small bladder capacity (183 mL) with relatively normal filling sensations.  -Good bladder compliance without DO/DOI.  -Good detrusor contraction during voiding to max Pdet 28 cm H2O.  -Slow flow rate (Qmax 4.8 mL/s) with a fluctuating, plateau flow curve and complete bladder emptying (final PVR 39 mL).  -Quiet EMG activity during voiding.  -BOOI is 7.9 which is negative for bladder outlet obstruction.  -Fluoroscopy reveals a mildly-trabeculated bladder wall without diverticulae or cellules.  No vesicoureteral reflux was observed.  The bladder neck was closed during filling and appears to remain tight during voiding.      PROCEDURE:    -Sterile urethral catheterization for measurement of postvoid residual urine volume.  -Complex filling cystometrogram with measurement of bladder and rectal pressures.  -Complex voiding cystometrogram with measurement of bladder and rectal pressures.  -Electromyography of the pelvic floor during urodynamics.  -Fluoroscopic imaging of the bladder during urodynamics, at least 3 views.    -Interpretation of urodynamics and flouroscopic imaging.      INDICATIONS FOR PROCEDURE:  Mr. Joseph Grissom is a pleasant 69 year old male who presents for video urodynamic assessment. VUDS is requested today by Dr. Carrasco to better characterize Mr. Joseph Grissom's voiding dysfunction.      DESCRIPTION OF PROCEDURE:  Risks, benefits, and alternatives to urodynamics were discussed with the patient and he wished to proceed.  Urodynamics are planned to better assess the primary etiology for Mr. Phoenixs urologic dysfunction.  After informed consent was obtained, the patient was taken to the procedure room where the study was initiated. Findings below.     Postvoid residual by catheter: 150 mL.    Next a 7F double-lumen urodynamics  catheter was inserted into the bladder under sterile technique via the urethra.  A 7F abdominal manometry catheter was placed in the rectum.  EMG pads were placed on both sides of the anal verge.  The bladder was filled with 100 mL of Omnipaque at 30 mL/minute and serial pressures were recorded.  With coughing there was an appropriate rise in vesical and abdominal pressures with no change in detrusor pressure, confirming good study catheter placement.    DURING THE FILLING PHASE:  First sensation: 56 mL.  First Desire: 121 mL.  Strong Desire: 141 mL.  Maximum Capacity: 183 mL.    Uninhibited detrusor contractions: None.  Compliance: Good. PDet=0 cmH20 at capacity.  Continence: No incontinence.  EMG: Concordant during filling.    DURING THE VOIDING PHASE:  Maximum detrusor contraction with void: 28 cm of H2O pressure.  Voided volume: 144 mL.  Maximum flow rate: 4.8 mL/sec.  Average flow rate: 3.2 mL/sec.  Postvoid Residual: 39 mL.  EMG activity: Quiet.  Character of voiding curve: Fluctuating plateau.  BOOI: 7.9 (suggesting no obstruction - see key below)  [obstructed (WOODS index [BOOI] >= 40); equivocal (no definite   obstruction; BOOI 20-40); and no obstruction (BOOI <= 20)]    FLUOROSCOPIC IMAGING OF THE BLADDER DURING URODYNAMICS:  Please note, image numbers on UDS tracings correlate with iSite series numbers on PACS images. Fluoroscopy during today's procedure demonstrated a mildly-trabeculated bladder wall without diverticulae or cellules.  No vesicoureteral reflux was observed.  The bladder neck was closed during filling and appears to remain tight during voiding.  At the completion of the study, all catheters were removed and the patient was brought back into the consultation room to further discuss today's study results.      ASSESSMENT/PLAN:  Mr. Joseph Grissom is a pleasant 69 year old male who demonstrated the following findings today on urodynamic evaluation:    -Small bladder capacity (183 mL) with  relatively normal filling sensations.  -Good bladder compliance without DO/DOI.  -Good detrusor contraction during voiding to max Pdet 28 cm H2O.  -Slow flow rate (Qmax 4.8 mL/s) with a fluctuating, plateau flow curve and complete bladder emptying (final PVR 39 mL).  -Quiet EMG activity during voiding.  -BOOI is 7.9 which is negative for bladder outlet obstruction.  -Fluoroscopy reveals a mildly-trabeculated bladder wall without diverticulae or cellules.  No vesicoureteral reflux was observed.  The bladder neck was closed during filling and appears to remain tight during voiding.      The patient will follow up as scheduled with Dr. Kent to further discuss today's study results and make plans for how best to proceed.      - A single Bactrim was provided for UTI prophylaxis following completion of today's study per department protocol.  The risk of UTI with VUDS is low at ~2.5-3%.      Thank you for allowing me to participate in the care of Mr. Joseph Grissom and please don't hesitate to contact me with any questions or concerns.      This procedure was performed under a collaborative agreement with Dr. Nitin Davies, Professor and  of Urology, [facility].    BONNIE Alvares, CNP  Department of Urology

## 2024-12-30 ENCOUNTER — ALLIED HEALTH/NURSE VISIT (OUTPATIENT)
Dept: UROLOGY | Facility: CLINIC | Age: 69
End: 2024-12-30
Payer: MEDICARE

## 2024-12-30 ENCOUNTER — ANCILLARY PROCEDURE (OUTPATIENT)
Dept: RADIOLOGY | Facility: AMBULATORY SURGERY CENTER | Age: 69
End: 2024-12-30
Attending: NURSE PRACTITIONER
Payer: MEDICARE

## 2024-12-30 VITALS — DIASTOLIC BLOOD PRESSURE: 92 MMHG | OXYGEN SATURATION: 98 % | SYSTOLIC BLOOD PRESSURE: 161 MMHG | HEART RATE: 64 BPM

## 2024-12-30 DIAGNOSIS — R39.89 URINARY PROBLEM: ICD-10-CM

## 2024-12-30 DIAGNOSIS — R35.0 URINARY FREQUENCY: Primary | ICD-10-CM

## 2024-12-30 PROCEDURE — 51784 ANAL/URINARY MUSCLE STUDY: CPT | Performed by: NURSE PRACTITIONER

## 2024-12-30 PROCEDURE — 51728 CYSTOMETROGRAM W/VP: CPT | Performed by: NURSE PRACTITIONER

## 2024-12-30 PROCEDURE — 51741 ELECTRO-UROFLOWMETRY FIRST: CPT | Performed by: NURSE PRACTITIONER

## 2024-12-30 PROCEDURE — 51797 INTRAABDOMINAL PRESSURE TEST: CPT | Performed by: NURSE PRACTITIONER

## 2024-12-30 PROCEDURE — 51600 INJECTION FOR BLADDER X-RAY: CPT | Performed by: NURSE PRACTITIONER

## 2024-12-30 PROCEDURE — 74455 X-RAY URETHRA/BLADDER: CPT | Performed by: NURSE PRACTITIONER

## 2024-12-30 RX ORDER — SULFAMETHOXAZOLE AND TRIMETHOPRIM 800; 160 MG/1; MG/1
1 TABLET ORAL ONCE
Status: COMPLETED | OUTPATIENT
Start: 2024-12-30 | End: 2024-12-30

## 2024-12-30 RX ADMIN — SULFAMETHOXAZOLE AND TRIMETHOPRIM 1 TABLET: 800; 160 TABLET ORAL at 08:44

## 2024-12-30 ASSESSMENT — PAIN SCALES - GENERAL: PAINLEVEL_OUTOF10: NO PAIN (0)

## 2024-12-30 NOTE — NURSING NOTE
Chief Complaint   Patient presents with    Urodynamics Study     Urinary frequency/urgency        Blood pressure (!) 171/98, pulse 64, SpO2 98%. There is no height or weight on file to calculate BMI.    Patient Active Problem List   Diagnosis    Prostate cancer (H)    Pelvic pain in male       Allergies   Allergen Reactions    Eggs [Chicken-Derived Products (Egg)]     Penicillins        Current Outpatient Medications   Medication Sig Dispense Refill    atorvastatin (LIPITOR) 20 MG tablet Take 1 tablet (20 mg) by mouth daily. 90 tablet 1    Diphenhydramine-APAP 12.5-500 MG TABS       fexofenadine (ALLEGRA) 180 MG tablet Take 180 mg by mouth daily      naphazoline-pheniramine (OPCON-A/VISINE A/NAPHCON A) SOLN ophthalmic solution 1-2 drops 4 times daily as needed for irritation      pantothenic acid 500 MG TABS Take 500 mg by mouth      Triamcinolone Acetonide (NASACORT ALLERGY 24HR NA)       solifenacin (VESICARE) 5 MG tablet Take 1 tablet (5 mg) by mouth daily. (Patient not taking: Reported on 2024) 30 tablet 2    typhoid (VIVOTIF) CR capsule Take 1 capsule by mouth every other day (Patient not taking: Reported on 2024) 4 capsule 0       Social History     Tobacco Use    Smoking status: Former     Current packs/day: 0.00     Average packs/day: 0.5 packs/day for 3.2 years (1.6 ttl pk-yrs)     Types: Cigarettes     Start date: 1962     Quit date: 1965     Years since quittin.3     Passive exposure: Never    Smokeless tobacco: Never    Tobacco comments:     I was a 9-?12   Vaping Use    Vaping status: Never Used   Substance Use Topics    Alcohol use: Yes     Comment: I have a drink once about every other month, wine or beer    Drug use: Not Currently     Comment: I want to drink or try ?CBC products more often lately, but       Invasive Procedure Safety Checklist:    Procedure: Urodynamics    Action: Complete sections and checkboxes as appropriate.  Pre-procedure:  1. Patient ID Verified with  2 identifiers (Sandra and  or MRN) : YES    2. Procedure and site verified with patient/designee (when able) : YES    3. Accurate consent documentation in medical record : YES    4. H&P (or appropriate assessment) documented in medical record : N/A  H&P must be up to 30 days prior to procedure an updated within 24 hours of Procedure as applicable.     5. Relevant diagnostic and radiology test results appropriately labeled and displayed as applicable : YES    6. Blood products, implants, devices, and/or special equipment available for the procedure as applicable : YES    7. Procedure site(s) marked with provider initials [Exclusions: none] : NO    8. Marking not required. Reason : Yes  Procedure does not require site marking    Time Out:     Time-Out performed immediately prior to starting procedure, including verbal and active participation of all team members addressing: YES    1. Correct patient identity.  2. Confirmed that the correct side and site are marked.  3. An accurate procedure to be done.  4. Agreement on the procedure to be done.  5. Correct patient position.  6. Relevant images and results are properly labeled and appropriately displayed.  7. The need to administer antibiotics or fluids for irrigation purposes during the procedure as applicable.  8. Safety precautions based on patient history or medication use.    During Procedure: Verification of correct person, site, and procedure occurs any time the responsibility for care of the patient is transferred to another member of the care team.      The following medication was given:     MEDICATION: Bactrim (Trimethoprim / Sulfamethoxazole)  ROUTE: PO  SITE: Medication was given orally  DOSE: 800mg/160mg  LOT #: [id number]  : [employer]   EXPIRATION DATE: 2026  NDC#: [id number]   Was there drug waste? No    Prior to medication administration, verified patient identity using patient's name and date of birth.  Due to  medication administration, patient instructed to remain in clinic for 15 minutes  afterwards, and to report any adverse reaction to me immediately.   Prior to administration, verified patient identity using patient's name and date of birth.  Due to administration, patient instructed to remain in clinic for 15 minutes  afterwards, and to report any adverse reaction to me immediately.    Drug Amount Wasted:  None.  Vial/Syringe: Single dose vial    The following medication was given:     MEDICATION:  Omnipaque (Iohexol Injection) (240mgI/mL)  ROUTE: Provider Administered  SITE: Provider Administered via catheter  DOSE: 100mL  LOT #: [id number]  : [employer]  EXPIRATION DATE: 04/10/2027  NDC#: 0988-0096-39   Was there drug waste? No    Prior to injection, verified patient identity using patient's name and date of birth.  Due to injection administration, patient instructed to remain in clinic for 15 minutes  afterwards, and to report any adverse reaction to me immediately.    Drug Amount Wasted:  None.  Vial/Syringe: Single dose vial      The following medication was given: See Above    Insertion:  14 Fr straight tipped silicone straight catheter inserted into urethral meatus in the usual sterile fashion without difficulty.  Received 150 ml yellow urine output.     Patient did tolerate procedure well.    Patient instructed as to where to call or go for pain, fever, leakage, or decreased urine flow.     This service provided today was under the direct supervision of Shira Brown DNP, who was available if needed.    Niki Gomez LPN  12/30/2024  8:42 AM

## 2025-01-02 ENCOUNTER — PRE VISIT (OUTPATIENT)
Dept: UROLOGY | Facility: CLINIC | Age: 70
End: 2025-01-02
Payer: MEDICARE

## 2025-01-02 NOTE — TELEPHONE ENCOUNTER
Reason for visit: UDS follow-up      Relevant information: H/o pelvic pain in male, prostate cancer, ,urgency-frequency syndrome, overactive bladder. Tried medication without improvement. UDS completed 12/30/24    Records/imaging/labs/orders: All records available    Pt called: no need for a call    At Rooming: Video visit      Jacinta Berman  1/2/2025  3:09 PM

## 2025-01-08 SDOH — HEALTH STABILITY: PHYSICAL HEALTH: ON AVERAGE, HOW MANY MINUTES DO YOU ENGAGE IN EXERCISE AT THIS LEVEL?: 20 MIN

## 2025-01-08 SDOH — HEALTH STABILITY: PHYSICAL HEALTH: ON AVERAGE, HOW MANY DAYS PER WEEK DO YOU ENGAGE IN MODERATE TO STRENUOUS EXERCISE (LIKE A BRISK WALK)?: 5 DAYS

## 2025-01-08 ASSESSMENT — SOCIAL DETERMINANTS OF HEALTH (SDOH): HOW OFTEN DO YOU GET TOGETHER WITH FRIENDS OR RELATIVES?: MORE THAN THREE TIMES A WEEK

## 2025-01-08 NOTE — PROGRESS NOTES
"Preventive Care Visit  [facility] [place]  Twin Holcomb DO, Family Medicine  Jan 9, 2025      Assessment & Plan     Encounter for Medicare annual wellness exam  Health maintenance reviewed and updated. Emphasized importance of balanced diet and regular exercise.  - PSA, tumor marker; Future  - CBC with platelets; Future  - UA Macroscopic with reflex to Microscopic and Culture - Lab Collect; Future    Risk of myocardial infarction or stroke 7.5% or greater in next 10 years  Recheck labs. Continue atorvastatin.  - atorvastatin (LIPITOR) 20 MG tablet; Take 1 tablet (20 mg) by mouth daily.    Prostate cancer (H)  Following with urology.    Screening for diabetes mellitus  - Comprehensive metabolic panel (BMP + Alb, Alk Phos, ALT, AST, Total. Bili, TP); Future    Hyperlipidemia, unspecified hyperlipidemia type  - Lipid panel reflex to direct LDL Fasting; Future    Patient has been advised of split billing requirements and indicates understanding: Yes        BMI  Estimated body mass index is 28.59 kg/m  as calculated from the following:    Height as of this encounter: 1.803 m (5' 11\").    Weight as of this encounter: 93 kg (205 lb).     Counseling  Appropriate preventive services were addressed with this patient via screening, questionnaire, or discussion as appropriate for fall prevention, nutrition, physical activity, Tobacco-use cessation, social engagement, weight loss and cognition.  Checklist reviewing preventive services available has been given to the patient.  Reviewed patient's diet, addressing concerns and/or questions.   The patient was instructed to see the dentist every 6 months.   Discussed possible causes of fatigue. The patient was provided with written information regarding signs of hearing loss.   Information on urinary incontinence and treatment options given to patient.       Jonel Pitts is a 69 year old, presenting for the following:  Physical        1/9/2025     6:50 AM "   Additional Questions   Roomed by ISAURA Coy CMA   Accompanied by son     HPI    Hyperlipidemia Follow-Up    Are you regularly taking any medication or supplement to lower your cholesterol?   Yes- Lipitor 20 mg  Are you having muscle aches or other side effects that you think could be caused by your cholesterol lowering medication?  No      Health Care Directive  Patient does not have a Health Care Directive: Discussed advance care planning with patient; information given to patient to review.      1/8/2025   General Health   How would you rate your overall physical health? Good   Feel stress (tense, anxious, or unable to sleep) Not at all         1/8/2025   Nutrition   Diet: Low salt    Low fat/cholesterol    Breakfast skipped       Multiple values from one day are sorted in reverse-chronological order         1/8/2025   Exercise   Days per week of moderate/strenous exercise 5 days   Average minutes spent exercising at this level 20 min         1/8/2025   Social Factors   Frequency of gathering with friends or relatives More than three times a week   Worry food won't last until get money to buy more No   Food not last or not have enough money for food? No   Do you have housing? (Housing is defined as stable permanent housing and does not include staying ouside in a car, in a tent, in an abandoned building, in an overnight shelter, or couch-surfing.) Yes   Are you worried about losing your housing? No   Lack of transportation? No   Unable to get utilities (heat,electricity)? No         1/8/2025   Fall Risk   Fallen 2 or more times in the past year? No   Trouble with walking or balance? No          1/8/2025   Activities of Daily Living- Home Safety   Needs help with the following daily activites None of the above   Safety concerns in the home None of the above         1/8/2025   Dental   Dentist two times every year? (!) NO           1/8/2025   Hearing Screening   Hearing concerns? (!) I FEEL THAT PEOPLE ARE MUMBLING  OR NOT SPEAKING CLEARLY.    (!) I NEED TO ASK PEOPLE TO SPEAK UP OR REPEAT THEMSELVES.    (!) IT'S HARDER TO UNDERSTAND WOMEN'S VOICES THAN MEN'S VOICES.    (!) IT'S HARD TO FOLLOW A CONVERSATION IN A NOISY RESTAURANT OR CROWDED ROOM.    (!) TROUBLE UNDERSTANDING SOFT OR WHISPERED SPEECH.    (!) TROUBLE UNDERSTANDING SPEECH ON THE TELEPHONE       Multiple values from one day are sorted in reverse-chronological order         2025   Driving Risk Screening   Patient/family members have concerns about driving No         2025   General Alertness/Fatigue Screening   Have you been more tired than usual lately? (!) YES         2025   Urinary Incontinence Screening   Bothered by leaking urine in past 6 months Yes           2025   TB Screening   Were you born outside of the [place]? No         Today's PHQ-2 Score:       2025     6:20 AM   PHQ-2 (  [employer])   Q1: Little interest or pleasure in doing things 0   Q2: Feeling down, depressed or hopeless 0   PHQ-2 Score 0    Q1: Little interest or pleasure in doing things Not at all   Q2: Feeling down, depressed or hopeless Not at all   PHQ-2 Score 0       Patient-reported           2025   Substance Use   Alcohol more than 3/day or more than 7/wk No   Do you have a current opioid prescription? No   How severe/bad is pain from 1 to 10? 4/10   Do you use any other substances recreationally? No     Social History     Tobacco Use    Smoking status: Former     Current packs/day: 0.00     Average packs/day: 0.5 packs/day for 3.2 years (1.6 ttl pk-yrs)     Types: Cigarettes     Start date: 1962     Quit date: 1965     Years since quittin.4     Passive exposure: Never    Smokeless tobacco: Never    Tobacco comments:     I was a 9-?12   Vaping Use    Vaping status: Never Used   Substance Use Topics    Alcohol use: Yes     Comment: I have a drink once about every other month, wine or beer    Drug use: Not Currently     Comment: I want to drink or try ?CBC  "products more often lately, but       ASCVD Risk   The ASCVD Risk score (Meghan WADE, et al., 2019) failed to calculate for the following reasons:    The valid total cholesterol range is 130 to 320 mg/dL    Reviewed and updated as needed this visit by Provider   Tobacco  Allergies  Meds  Problems  Med Hx  Surg Hx  Fam Hx          Current providers sharing in care for this patient include:  Patient Care Team:  Twin Holcomb DO as PCP - General (Family Practice)  Eamon Cano MD as MD (Urology)  Twin Holcomb DO as Assigned PCP  Eamon Cano MD as Assigned Surgical Provider    The following health maintenance items are reviewed in Epic and correct as of today:  Health Maintenance   Topic Date Due    ZOSTER IMMUNIZATION (2 of 2) 04/03/2023    INFLUENZA VACCINE (1) 09/01/2024    LIPID  12/29/2024    ANNUAL REVIEW OF HM ORDERS  01/03/2025    MEDICARE ANNUAL WELLNESS VISIT  01/03/2025    FALL RISK ASSESSMENT  01/09/2026    GLUCOSE  12/29/2026    RSV VACCINE (1 - 1-dose 75+ series) 08/25/2028    DTAP/TDAP/TD IMMUNIZATION (2 - Td or Tdap) 08/05/2029    ADVANCE CARE PLANNING  01/09/2030    HEPATITIS C SCREENING  Completed    PHQ-2 (once per calendar year)  Completed    Pneumococcal Vaccine: 50+ Years  Completed    AORTIC ANEURYSM SCREENING (SYSTEM ASSIGNED)  Completed    HPV IMMUNIZATION  Aged Out    MENINGITIS IMMUNIZATION  Aged Out    RSV MONOCLONAL ANTIBODY  Aged Out    COLORECTAL CANCER SCREENING  Discontinued    COVID-19 Vaccine  Discontinued       Review of Systems  Constitutional, HEENT, cardiovascular, pulmonary, gi and gu systems are negative, except as otherwise noted.     Objective    Exam  /74   Pulse 63   Temp 97.8  F (36.6  C) (Tympanic)   Resp 12   Ht 1.803 m (5' 11\")   Wt 93 kg (205 lb)   SpO2 97%   BMI 28.59 kg/m     Estimated body mass index is 28.59 kg/m  as calculated from the following:    Height as of this encounter: 1.803 m (5' 11\").    Weight as " of this encounter: 93 kg (205 lb).    Physical Exam  GENERAL: alert and no distress  EYES: Eyes grossly normal to inspection  HENT: ear canals and TM's normal, nose and mouth without ulcers or lesions  NECK: no adenopathy, no asymmetry, masses, or scars  RESP: lungs clear to auscultation - no rales, rhonchi or wheezes  CV: regular rates and rhythm, normal S1 S2, no S3 or S4, and no murmur, click or rub  ABDOMEN: soft, nontender, without hepatosplenomegaly or masses  MS: no gross musculoskeletal defects noted, no edema  SKIN: no suspicious lesions or rashes  PSYCH: mentation appears normal, affect normal/bright         1/9/2025   Mini Cog   Clock Draw Score 2 Normal   3 Item Recall 2 objects recalled   Mini Cog Total Score 4        Signed Electronically by: Twin Holcomb,

## 2025-01-09 ENCOUNTER — OFFICE VISIT (OUTPATIENT)
Dept: FAMILY MEDICINE | Facility: CLINIC | Age: 70
End: 2025-01-09
Payer: MEDICARE

## 2025-01-09 VITALS
WEIGHT: 205 LBS | RESPIRATION RATE: 12 BRPM | HEIGHT: 71 IN | TEMPERATURE: 97.8 F | DIASTOLIC BLOOD PRESSURE: 74 MMHG | OXYGEN SATURATION: 97 % | SYSTOLIC BLOOD PRESSURE: 124 MMHG | HEART RATE: 63 BPM | BODY MASS INDEX: 28.7 KG/M2

## 2025-01-09 DIAGNOSIS — E78.5 HYPERLIPIDEMIA, UNSPECIFIED HYPERLIPIDEMIA TYPE: ICD-10-CM

## 2025-01-09 DIAGNOSIS — C61 PROSTATE CANCER (H): ICD-10-CM

## 2025-01-09 DIAGNOSIS — Z00.00 ENCOUNTER FOR MEDICARE ANNUAL WELLNESS EXAM: Primary | ICD-10-CM

## 2025-01-09 DIAGNOSIS — Z13.1 SCREENING FOR DIABETES MELLITUS: ICD-10-CM

## 2025-01-09 DIAGNOSIS — Z91.89 RISK OF MYOCARDIAL INFARCTION OR STROKE 7.5% OR GREATER IN NEXT 10 YEARS: ICD-10-CM

## 2025-01-09 LAB
ALBUMIN SERPL BCG-MCNC: 4 G/DL (ref 3.5–5.2)
ALBUMIN UR-MCNC: NEGATIVE MG/DL
ALP SERPL-CCNC: 96 U/L (ref 40–150)
ALT SERPL W P-5'-P-CCNC: 48 U/L (ref 0–70)
ANION GAP SERPL CALCULATED.3IONS-SCNC: 8 MMOL/L (ref 7–15)
APPEARANCE UR: CLEAR
AST SERPL W P-5'-P-CCNC: 34 U/L (ref 0–45)
BILIRUB SERPL-MCNC: 0.4 MG/DL
BILIRUB UR QL STRIP: NEGATIVE
BUN SERPL-MCNC: 13.9 MG/DL (ref 8–23)
CALCIUM SERPL-MCNC: 9 MG/DL (ref 8.8–10.4)
CHLORIDE SERPL-SCNC: 108 MMOL/L (ref 98–107)
CHOLEST SERPL-MCNC: 157 MG/DL
COLOR UR AUTO: YELLOW
CREAT SERPL-MCNC: 0.96 MG/DL (ref 0.67–1.17)
EGFRCR SERPLBLD CKD-EPI 2021: 85 ML/MIN/1.73M2
ERYTHROCYTE [DISTWIDTH] IN BLOOD BY AUTOMATED COUNT: 12 % (ref 10–15)
FASTING STATUS PATIENT QL REPORTED: YES
FASTING STATUS PATIENT QL REPORTED: YES
GLUCOSE SERPL-MCNC: 115 MG/DL (ref 70–99)
GLUCOSE UR STRIP-MCNC: NEGATIVE MG/DL
HCO3 SERPL-SCNC: 26 MMOL/L (ref 22–29)
HCT VFR BLD AUTO: 40.7 % (ref 40–53)
HDLC SERPL-MCNC: 31 MG/DL
HGB BLD-MCNC: 14.2 G/DL (ref 13.3–17.7)
HGB UR QL STRIP: NEGATIVE
KETONES UR STRIP-MCNC: NEGATIVE MG/DL
LDLC SERPL CALC-MCNC: 71 MG/DL
LEUKOCYTE ESTERASE UR QL STRIP: NEGATIVE
MCH RBC QN AUTO: 32.8 PG (ref 26.5–33)
MCHC RBC AUTO-ENTMCNC: 34.9 G/DL (ref 31.5–36.5)
MCV RBC AUTO: 94 FL (ref 78–100)
NITRATE UR QL: NEGATIVE
NONHDLC SERPL-MCNC: 126 MG/DL
PH UR STRIP: 5.5 [PH] (ref 5–7)
PLATELET # BLD AUTO: 185 10E3/UL (ref 150–450)
POTASSIUM SERPL-SCNC: 4.8 MMOL/L (ref 3.4–5.3)
PROT SERPL-MCNC: 7.5 G/DL (ref 6.4–8.3)
PSA SERPL DL<=0.01 NG/ML-MCNC: 0.03 NG/ML (ref 0–6.5)
RBC # BLD AUTO: 4.33 10E6/UL (ref 4.4–5.9)
SODIUM SERPL-SCNC: 142 MMOL/L (ref 135–145)
SP GR UR STRIP: 1.02 (ref 1–1.03)
TRIGL SERPL-MCNC: 277 MG/DL
UROBILINOGEN UR STRIP-ACNC: 0.2 E.U./DL
WBC # BLD AUTO: 5.1 10E3/UL (ref 4–11)

## 2025-01-09 RX ORDER — ATORVASTATIN CALCIUM 20 MG/1
20 TABLET, FILM COATED ORAL DAILY
Qty: 90 TABLET | Refills: 3 | Status: SHIPPED | OUTPATIENT
Start: 2025-01-09

## 2025-01-09 RX ORDER — GABAPENTIN 300 MG/1
300 CAPSULE ORAL 2 TIMES DAILY
COMMUNITY
Start: 2024-10-03 | End: 2025-01-21

## 2025-01-09 RX ORDER — ACETAMINOPHEN 500 MG
1000 TABLET ORAL 3 TIMES DAILY PRN
COMMUNITY
Start: 2024-12-11

## 2025-01-09 ASSESSMENT — PAIN SCALES - GENERAL: PAINLEVEL_OUTOF10: NO PAIN (0)

## 2025-01-09 NOTE — PATIENT INSTRUCTIONS
Patient Education   Preventive Care Advice   This is general advice given by our system to help you stay healthy. However, your care team may have specific advice just for you. Please talk to your care team about your preventive care needs.  Nutrition  Eat 5 or more servings of fruits and vegetables each day.  Try wheat bread, brown rice and whole grain pasta (instead of white bread, rice, and pasta).  Get enough calcium and vitamin D. Check the label on foods and aim for 100% of the RDA (recommended daily allowance).  Lifestyle  Exercise at least 150 minutes each week  (30 minutes a day, 5 days a week).  Do muscle strengthening activities 2 days a week. These help control your weight and prevent disease.  No smoking.  Wear sunscreen to prevent skin cancer.  Have a dental exam and cleaning every 6 months.  Yearly exams  See your health care team every year to talk about:  Any changes in your health.  Any medicines your care team has prescribed.  Preventive care, family planning, and ways to prevent chronic diseases.  Shots (vaccines)   HPV shots (up to age 26), if you've never had them before.  Hepatitis B shots (up to age 59), if you've never had them before.  COVID-19 shot: Get this shot when it's due.  Flu shot: Get a flu shot every year.  Tetanus shot: Get a tetanus shot every 10 years.  Pneumococcal, hepatitis A, and RSV shots: Ask your care team if you need these based on your risk.  Shingles shot (for age 50 and up)  General health tests  Diabetes screening:  Starting at age 35, Get screened for diabetes at least every 3 years.  If you are younger than age 35, ask your care team if you should be screened for diabetes.  Cholesterol test: At age 39, start having a cholesterol test every 5 years, or more often if advised.  Bone density scan (DEXA): At age 50, ask your care team if you should have this scan for osteoporosis (brittle bones).  Hepatitis C: Get tested at least once in your life.  STIs (sexually  transmitted infections)  Before age 24: Ask your care team if you should be screened for STIs.  After age 24: Get screened for STIs if you're at risk. You are at risk for STIs (including HIV) if:  You are sexually active with more than one person.  You don't use condoms every time.  You or a partner was diagnosed with a sexually transmitted infection.  If you are at risk for HIV, ask about PrEP medicine to prevent HIV.  Get tested for HIV at least once in your life, whether you are at risk for HIV or not.  Cancer screening tests  Cervical cancer screening: If you have a cervix, begin getting regular cervical cancer screening tests starting at age 21.  Breast cancer scan (mammogram): If you've ever had breasts, begin having regular mammograms starting at age 40. This is a scan to check for breast cancer.  Colon cancer screening: It is important to start screening for colon cancer at age 45.  Have a colonoscopy test every 10 years (or more often if you're at risk) Or, ask your provider about stool tests like a FIT test every year or Cologuard test every 3 years.  To learn more about your testing options, visit:   .  For help making a decision, visit:   https://bit.ly/sx89292.  Prostate cancer screening test: If you have a prostate, ask your care team if a prostate cancer screening test (PSA) at age 55 is right for you.  Lung cancer screening: If you are a current or former smoker ages 50 to 80, ask your care team if ongoing lung cancer screenings are right for you.  For informational purposes only. Not to replace the advice of your health care provider. Copyright   2023 [facility] [employer]. All rights reserved. Clinically reviewed by the [facility]. Hundo [id number] - REV 01/24.  Hearing Loss: Care Instructions  Overview     Hearing loss is a sudden or slow decrease in how well you hear. It can range from slight to profound. Permanent hearing loss can occur with aging. It also can  happen when you are exposed long-term to loud noise. Examples include listening to loud music, riding motorcycles, or being around other loud machines.  Hearing loss can affect your work and home life. It can make you feel lonely or depressed. You may feel that you have lost your independence. But hearing aids and other devices can help you hear better and feel connected to others.  Follow-up care is a key part of your treatment and safety. Be sure to make and go to all appointments, and call your doctor if you are having problems. It's also a good idea to know your test results and keep a list of the medicines you take.  How can you care for yourself at home?  Avoid loud noises whenever possible. This helps keep your hearing from getting worse.  Always wear hearing protection around loud noises.  Wear a hearing aid as directed.  A professional can help you pick a hearing aid that will work best for you.  You can also get hearing aids over the counter for mild to moderate hearing loss.  Have hearing tests as your doctor suggests. They can show whether your hearing has changed. Your hearing aid may need to be adjusted.  Use other devices as needed. These may include:  Telephone amplifiers and hearing aids that can connect to a television, stereo, radio, or microphone.  Devices that use lights or vibrations. These alert you to the doorbell, a ringing telephone, or a baby monitor.  Television closed-captioning. This shows the words at the bottom of the screen. Most new TVs can do this.  TTY (text telephone). This lets you type messages back and forth on the telephone instead of talking or listening. These devices are also called TDD. When messages are typed on the keyboard, they are sent over the phone line to a receiving TTY. The message is shown on a monitor.  Use text messaging, social media, and email if it is hard for you to communicate by telephone.  Try to learn a listening technique called speechreading. It is  "not lipreading. You pay attention to people's gestures, expressions, posture, and tone of voice. These clues can help you understand what a person is saying. Face the person you are talking to, and have them face you. Make sure the lighting is good. You need to see the other person's face clearly.  Think about counseling if you need help to adjust to your hearing loss.  When should you call for help?  Watch closely for changes in your health, and be sure to contact your doctor if:    You think your hearing is getting worse.     You have new symptoms, such as dizziness or nausea.   Where can you learn more?  Go to https://www.[employer].net/patiented  Enter R798 in the search box to learn more about \"Hearing Loss: Care Instructions.\"  Current as of: September 27, 2023  Content Version: 14.3    2024 [employer].   Care instructions adapted under license by your healthcare professional. If you have questions about a medical condition or this instruction, always ask your healthcare professional. [employer] disclaims any warranty or liability for your use of this information.    Learning About Sleeping Well  What does sleeping well mean?     Sleeping well means getting enough sleep to feel good and stay healthy. How much sleep is enough varies among people.  The number of hours you sleep and how you feel when you wake up are both important. If you do not feel refreshed, you probably need more sleep. Another sign of not getting enough sleep is feeling tired during the day.  Experts recommend that adults get at least 7 or more hours of sleep per day. Children and older adults need more sleep.  Why is getting enough sleep important?  Getting enough quality sleep is a basic part of good health. When your sleep suffers, your physical health, mood, and your thoughts can suffer too. You may find yourself feeling more grumpy or stressed. Not getting enough sleep also can lead to serious problems, including " "injury, accidents, anxiety, and depression.  What might cause poor sleeping?  Many things can cause sleep problems, including:  Changes to your sleep schedule.  Stress. Stress can be caused by fear about a single event, such as giving a speech. Or you may have ongoing stress, such as worry about work or school.  Depression, anxiety, and other mental or emotional conditions.  Changes in your sleep habits or surroundings. This includes changes that happen where you sleep, such as noise, light, or sleeping in a different bed. It also includes changes in your sleep pattern, such as having jet lag or working a late shift.  Health problems, such as pain, breathing problems, and restless legs syndrome.  Lack of regular exercise.  Using alcohol, nicotine, or caffeine before bed.  How can you help yourself?  Here are some tips that may help you sleep more soundly and wake up feeling more refreshed.  Your sleeping area   Use your bedroom only for sleeping and sex. A bit of light reading may help you fall asleep. But if it doesn't, do your reading elsewhere in the house. Try not to use your TV, computer, smartphone, or tablet while you are in bed.  Be sure your bed is big enough to stretch out comfortably, especially if you have a sleep partner.  Keep your bedroom quiet, dark, and cool. Use curtains, blinds, or a sleep mask to block out light. To block out noise, use earplugs, soothing music, or a \"white noise\" machine.  Your evening and bedtime routine   Create a relaxing bedtime routine. You might want to take a warm shower or bath, or listen to soothing music.  Go to bed at the same time every night. And get up at the same time every morning, even if you feel tired.  What to avoid   Limit caffeine (coffee, tea, caffeinated sodas) during the day, and don't have any for at least 6 hours before bedtime.  Avoid drinking alcohol before bedtime. Alcohol can cause you to wake up more often during the night.  Try not to smoke or " "use tobacco, especially in the evening. Nicotine can keep you awake.  Limit naps during the day, especially close to bedtime.  Avoid lying in bed awake for too long. If you can't fall asleep or if you wake up in the middle of the night and can't get back to sleep within about 20 minutes, get out of bed and go to another room until you feel sleepy.  Avoid taking medicine right before bed that may keep you awake or make you feel hyper or energized. Your doctor can tell you if your medicine may do this and if you can take it earlier in the day.  If you can't sleep   Imagine yourself in a peaceful, pleasant scene. Focus on the details and feelings of being in a place that is relaxing.  Get up and do a quiet or boring activity until you feel sleepy.  Avoid drinking any liquids before going to bed to help prevent waking up often to use the bathroom.  Where can you learn more?  Go to https://www.[employer].net/patiented  Enter J942 in the search box to learn more about \"Learning About Sleeping Well.\"  Current as of: July 31, 2024  Content Version: 14.3    2024 [employer].   Care instructions adapted under license by your healthcare professional. If you have questions about a medical condition or this instruction, always ask your healthcare professional. [employer] disclaims any warranty or liability for your use of this information.       "

## 2025-01-10 ENCOUNTER — VIRTUAL VISIT (OUTPATIENT)
Dept: UROLOGY | Facility: CLINIC | Age: 70
End: 2025-01-10
Payer: MEDICARE

## 2025-01-10 DIAGNOSIS — C61 PROSTATE CANCER (H): ICD-10-CM

## 2025-01-10 DIAGNOSIS — N32.81 OAB (OVERACTIVE BLADDER): Primary | ICD-10-CM

## 2025-01-10 DIAGNOSIS — Z92.3 HISTORY OF RADIATION THERAPY: ICD-10-CM

## 2025-01-10 DIAGNOSIS — R10.2 PELVIC PAIN IN MALE: ICD-10-CM

## 2025-01-10 RX ORDER — SOLIFENACIN SUCCINATE 10 MG/1
10 TABLET, FILM COATED ORAL DAILY
Qty: 90 TABLET | Refills: 1 | Status: SHIPPED | OUTPATIENT
Start: 2025-01-10

## 2025-01-10 NOTE — PROGRESS NOTES
Virtual Visit Details    Type of service:  Video Visit   Video Start Time: 9:55 AM  Video End Time:10:05 AM    Originating Location (pt. Location): Home    Distant Location (provider location):  On-site  Platform used for Video Visit: Cheryl    January 10, 2025    Gisselle was seen today for recheck.    Diagnoses and all orders for this visit:    OAB (overactive bladder)  -     solifenacin (VESICARE) 10 MG tablet; Take 1 tablet (10 mg) by mouth daily.    Prostate cancer (H)    History of radiation therapy    Pelvic pain in male      Continue to work with Saroj on the injections as they seem to help    Prostate cancer his recent PSA low and has follow up with Dr Cano this summer    Discussed UDS and his options and at this time he would like to continue the solifenacin, will try a higher dose to see if that helps    Discussed if he wants to continue medication he just needs to see someone in our group yearly and can follow with Dr Cano since he is already seeing him for his prostate cancer     17 minutes were spent today on the day of the encounter in reviewing the EMR including PSA result and interpretation of the UDS, direct patient care including prescription medication, coordination of care and documentation    Marii Kent MD MPH  (she/her/hers)   of Urology  [facility]    Subjective    Here today for follow up to discuss UDS results.  He denies any changes in health since last visit    There were no vitals taken for this visit.  GENERAL: healthy, alert and no distress  EYES: Eyes grossly normal to inspection, conjunctivae and sclerae normal  HENT: normal cephalic/atraumatic.  External ears, nose and mouth without ulcers or lesions.  RESP: no audible wheeze, cough, or visible cyanosis.  No visible retractions or increased work of breathing.  Able to speak fully in complete sentences.  NEURO: Cranial nerves grossly intact, mentation intact and speech normal  PSYCH: mentation  appears normal, affect normal/bright, judgement and insight intact, normal speech and appearance well-groomed    UDS reviewed by me.  On my interpretation smaller bladder capacity 183 mL with good compliance, no DO/DOI/USI.  Pdet max 28 with Qmax 4.8 with PVR 39 mL    PSA 1/9/25 0.03    CC  Patient Care Team:  Twin Holcomb DO as PCP - General (Family Practice)  Eamon Cano MD as MD (Urology)  Twin Holcomb DO as Assigned PCP  Eamon Cano MD as Assigned Surgical Provider  EAMON CANO

## 2025-01-10 NOTE — LETTER
1/10/2025       RE: Joseph Grissom  [address]  [place] [address]     Dear Colleague,    Thank you for referring your patient, Joseph Grissom, to the [facility] [place] at [facility]. Please see a copy of my visit note below.    Virtual Visit Details    Type of service:  Video Visit   Video Start Time: 9:55 AM  Video End Time:10:05 AM    Originating Location (pt. Location): Home    Distant Location (provider location):  On-site  Platform used for Video Visit: [facility]    January 10, 2025    Gisselle was seen today for recheck.    Diagnoses and all orders for this visit:    OAB (overactive bladder)  -     solifenacin (VESICARE) 10 MG tablet; Take 1 tablet (10 mg) by mouth daily.    Prostate cancer (H)    History of radiation therapy    Pelvic pain in male      Continue to work with Saroj on the injections as they seem to help    Prostate cancer his recent PSA low and has follow up with Dr Cano this summer    Discussed UDS and his options and at this time he would like to continue the solifenacin, will try a higher dose to see if that helps    Discussed if he wants to continue medication he just needs to see someone in our group yearly and can follow with Dr Cano since he is already seeing him for his prostate cancer     17 minutes were spent today on the day of the encounter in reviewing the EMR including PSA result and interpretation of the UDS, direct patient care including prescription medication, coordination of care and documentation    Marii Kent MD MPH  (she/her/hers)   of Urology  [facility]    Subjective    Here today for follow up to discuss UDS results.  He denies any changes in health since last visit    There were no vitals taken for this visit.  GENERAL: healthy, alert and no distress  EYES: Eyes grossly normal to inspection, conjunctivae and sclerae normal  HENT: normal  cephalic/atraumatic.  External ears, nose and mouth without ulcers or lesions.  RESP: no audible wheeze, cough, or visible cyanosis.  No visible retractions or increased work of breathing.  Able to speak fully in complete sentences.  NEURO: Cranial nerves grossly intact, mentation intact and speech normal  PSYCH: mentation appears normal, affect normal/bright, judgement and insight intact, normal speech and appearance well-groomed    UDS reviewed by me.  On my interpretation smaller bladder capacity 183 mL with good compliance, no DO/DOI/USI.  Pdet max 28 with Qmax 4.8 with PVR 39 mL    PSA 1/9/25 0.03    CC  Patient Care Team:  Twin Holcomb DO as PCP - General (Family Practice)  Eamon Cano MD as MD (Urology)  Twin Holcomb DO as Assigned PCP  Eamon Cano MD as Assigned Surgical Provider  EAMON CANO        Again, thank you for allowing me to participate in the care of your patient.      Sincerely,    Marii Kent MD

## 2025-01-10 NOTE — NURSING NOTE
PT has a nerve block apt today, stating increase in pain.     Current patient location: [address] [place] [address] [address]  [place] [address]    Is the patient currently in the state of [place]? YES    Visit mode: VIDEO    If the visit is dropped, the patient can be reconnected by:VIDEO VISIT: Text to cell phone:   Telephone Information:   Mobile [phone]       Will anyone else be joining the visit? NO  (If patient encounters technical issues they should call [phone]    Are changes needed to the allergy or medication list? No and Pt stated no med changes    Are refills needed on medications prescribed by this physician? NO    Rooming Documentation:  Not applicable    Reason for visit: EMMY MATTAF

## 2025-01-10 NOTE — PATIENT INSTRUCTIONS
Try the increase dose of the solifenacin    It was a pleasure meeting with you today.  Thank you for allowing me and my team the privilege of caring for you today.  YOU are the reason we are here, and I truly hope we provided you with the excellent service you deserve.  Please let us know if there is anything else we can do for you so that we can be sure you are leaving completely satisfied with your care experience.

## 2025-01-30 ENCOUNTER — TELEPHONE (OUTPATIENT)
Dept: UROLOGY | Facility: CLINIC | Age: 70
End: 2025-01-30
Payer: MEDICARE

## 2025-01-30 NOTE — TELEPHONE ENCOUNTER
Left Voicemail (1st Attempt) for the patient to call back and schedule the following:    Appointment type: Return Patient   Provider:   Return date: 3 months follow up in person for PVR and symptom check from visit date on 1/10/25  Specialty phone number: [phone]  Additional appointment(s) needed: n/a  Additonal Notes: n/a

## 2025-02-11 DIAGNOSIS — C61 PROSTATE CANCER (H): Primary | ICD-10-CM

## 2025-04-30 ENCOUNTER — OFFICE VISIT (OUTPATIENT)
Dept: UROLOGY | Facility: CLINIC | Age: 70
End: 2025-04-30
Payer: MEDICARE

## 2025-04-30 VITALS
HEART RATE: 66 BPM | HEIGHT: 71 IN | BODY MASS INDEX: 28.7 KG/M2 | DIASTOLIC BLOOD PRESSURE: 79 MMHG | WEIGHT: 205 LBS | OXYGEN SATURATION: 96 % | SYSTOLIC BLOOD PRESSURE: 153 MMHG

## 2025-04-30 DIAGNOSIS — N32.81 OAB (OVERACTIVE BLADDER): Primary | ICD-10-CM

## 2025-04-30 LAB — RESIDUAL VOLUME (RV) (EXTERNAL): 135

## 2025-04-30 PROCEDURE — 3078F DIAST BP <80 MM HG: CPT | Performed by: UROLOGY

## 2025-04-30 PROCEDURE — 99214 OFFICE O/P EST MOD 30 MIN: CPT | Mod: 25 | Performed by: UROLOGY

## 2025-04-30 PROCEDURE — 3077F SYST BP >= 140 MM HG: CPT | Performed by: UROLOGY

## 2025-04-30 PROCEDURE — 51798 US URINE CAPACITY MEASURE: CPT | Performed by: UROLOGY

## 2025-04-30 RX ORDER — SOLIFENACIN SUCCINATE 5 MG/1
10 TABLET, FILM COATED ORAL DAILY
Qty: 60 TABLET | Refills: 0 | Status: SHIPPED | OUTPATIENT
Start: 2025-04-30

## 2025-04-30 NOTE — NURSING NOTE
Chief Complaint   Patient presents with    OAB (overactive bladder)     Patient here today for 3 month follow up         Patient state he is not doing well with the medication increase.Patient voided in clinic  Patient  ml  by bladder scan  Patient also state he had a pain nerve blocked  Recent.          Luba Ramirez, ANA

## 2025-04-30 NOTE — PROGRESS NOTES
Chief Complaint   Patient presents with    OAB (overactive bladder)     Patient here today for 3 month follow up

## 2025-04-30 NOTE — LETTER
"4/30/2025       RE: Joseph Grissom  [address]  [place] [address]     Dear Colleague,    Thank you for referring your patient, Joseph Grissom, to the [facility] [place] at [facility]. Please see a copy of my visit note below.    Chief Complaint   Patient presents with     OAB (overactive bladder)     Patient here today for 3 month follow up       April 30, 2025    Gisselle was seen today for oab (overactive bladder).    Diagnoses and all orders for this visit:    OAB (overactive bladder)  -     MEASURE POST-VOID RESIDUAL URINE/BLADDER CAPACITY, US NON-IMAGING (45395)  -     solifenacin (VESICARE) 5 MG tablet; Take 2 tablets (10 mg) by mouth daily.       Will try the 5 mg again to see if it helps as the 10mg seemed to make things worse at times    Discussed consideration of adding beta 3 agonist like mirabegron but given he will be planning a spinal stimulator recommend we not alter his medications until he recovers for this    Return early fall after recovered from his ELDA stimulator    10 minutes were spent today on the day of the encounter in reviewing the EMR, direct patient care including discussion of prescription medications, coordination of care and documentation    Marii Kent MD MPH  (she/her/hers)   of Urology  [facility]    Subjective    Here for OAB follow up.  Increasing to 10mg seems to make it harder to urinate at times.  Denies any hematuria, UTI or episodes of retetnion.  He denies any changes in health since last visit    BP (!) 153/79   Pulse 66   Ht 1.803 m (5' 11\")   Wt 93 kg (205 lb)   SpO2 96%   BMI 28.59 kg/m    GENERAL: healthy, alert and no distress  EYES: Eyes grossly normal to inspection, conjunctivae and sclerae normal  HENT: normal cephalic/atraumatic.  External ears, nose and mouth without ulcers or lesions.  RESP: no audible wheeze, cough, or visible cyanosis.  " No visible retractions or increased work of breathing.  Able to speak fully in complete sentences.  NEURO: Cranial nerves grossly intact, mentation intact and speech normal  PSYCH: mentation appears normal, affect normal/bright, judgement and insight intact, normal speech and appearance well-groomed    CC  Patient Care Team:  Twin Holcomb DO as PCP - General (Family Practice)  Eamon Cano MD as MD (Urology)  Twin Holcomb DO as Assigned PCP  Luz Kent MD as Assigned Surgical Provider  LUZ KENT        Again, thank you for allowing me to participate in the care of your patient.      Sincerely,    Luz Kent MD

## 2025-04-30 NOTE — PATIENT INSTRUCTIONS
Try decreasing to 5mg daily     If that is better then we can change your prescription (MyChart message)    Return in August/September and consider beta3 agonist at that time    Good luck with the stimulator    It was a pleasure meeting with you today.  Thank you for allowing me and my team the privilege of caring for you today.  YOU are the reason we are here, and I truly hope we provided you with the excellent service you deserve.  Please let us know if there is anything else we can do for you so that we can be sure you are leaving completely satisfied with your care experience.

## 2025-04-30 NOTE — PROGRESS NOTES
"April 30, 2025    Gisselle was seen today for oab (overactive bladder).    Diagnoses and all orders for this visit:    OAB (overactive bladder)  -     MEASURE POST-VOID RESIDUAL URINE/BLADDER CAPACITY, US NON-IMAGING (50783)  -     solifenacin (VESICARE) 5 MG tablet; Take 2 tablets (10 mg) by mouth daily.       Will try the 5 mg again to see if it helps as the 10mg seemed to make things worse at times    Discussed consideration of adding beta 3 agonist like mirabegron but given he will be planning a spinal stimulator recommend we not alter his medications until he recovers for this    Return early fall after recovered from his ELDA stimulator    10 minutes were spent today on the day of the encounter in reviewing the EMR, direct patient care including discussion of prescription medications, coordination of care and documentation    Marii Kent MD MPH  (she/her/hers)   of Urology  [facility]    Subjective    Here for OAB follow up.  Increasing to 10mg seems to make it harder to urinate at times.  Denies any hematuria, UTI or episodes of retetnion.  He denies any changes in health since last visit    BP (!) 153/79   Pulse 66   Ht 1.803 m (5' 11\")   Wt 93 kg (205 lb)   SpO2 96%   BMI 28.59 kg/m    GENERAL: healthy, alert and no distress  EYES: Eyes grossly normal to inspection, conjunctivae and sclerae normal  HENT: normal cephalic/atraumatic.  External ears, nose and mouth without ulcers or lesions.  RESP: no audible wheeze, cough, or visible cyanosis.  No visible retractions or increased work of breathing.  Able to speak fully in complete sentences.  NEURO: Cranial nerves grossly intact, mentation intact and speech normal  PSYCH: mentation appears normal, affect normal/bright, judgement and insight intact, normal speech and appearance well-groomed    CC  Patient Care Team:  Twin Holcomb DO as PCP - General (Family Practice)  Eamon Cano MD as MD (Urology)  Aicha, " Twin CHAMPAGNE DO as Assigned PCP  Luz Kent MD as Assigned Surgical Provider  LUZ KENT

## 2025-05-01 ENCOUNTER — LAB (OUTPATIENT)
Dept: LAB | Facility: CLINIC | Age: 70
End: 2025-05-01
Payer: MEDICARE

## 2025-05-01 DIAGNOSIS — C61 PROSTATE CANCER (H): ICD-10-CM

## 2025-05-06 ENCOUNTER — TELEPHONE (OUTPATIENT)
Dept: UROLOGY | Facility: CLINIC | Age: 70
End: 2025-05-06
Payer: MEDICARE

## 2025-05-06 NOTE — TELEPHONE ENCOUNTER
Prior Authorization Retail Medication Request    Medication/Dose: Patient needs a PA on Solifenacin Succinate 10mg tablets  Diagnosis and ICD code (if different than what is on RX):    New/renewal/insurance change PA/secondary ins. PA:  Previously Tried and Failed:    Rationale:      Insurance   Primary: Medicare  Insurance ID:  [id number]    Secondary (if applicable):  Insurance ID:      Pharmacy Information (if different than what is on RX)  Name:    Phone:    Fax:    Clinic Information  Preferred routing pool for dept communication:  [facility]

## 2025-05-08 ENCOUNTER — OFFICE VISIT (OUTPATIENT)
Dept: UROLOGY | Facility: CLINIC | Age: 70
End: 2025-05-08
Payer: MEDICARE

## 2025-05-08 VITALS
DIASTOLIC BLOOD PRESSURE: 76 MMHG | HEIGHT: 71 IN | HEART RATE: 65 BPM | SYSTOLIC BLOOD PRESSURE: 159 MMHG | WEIGHT: 205 LBS | BODY MASS INDEX: 28.7 KG/M2

## 2025-05-08 DIAGNOSIS — C61 PROSTATE CANCER (H): Primary | ICD-10-CM

## 2025-05-08 DIAGNOSIS — N32.81 OAB (OVERACTIVE BLADDER): Primary | ICD-10-CM

## 2025-05-08 DIAGNOSIS — N32.81 OAB (OVERACTIVE BLADDER): ICD-10-CM

## 2025-05-08 DIAGNOSIS — N48.89 PENILE PAIN: ICD-10-CM

## 2025-05-08 RX ORDER — SOLIFENACIN SUCCINATE 10 MG/1
10 TABLET, FILM COATED ORAL DAILY
Qty: 90 TABLET | Refills: 0 | Status: SHIPPED | OUTPATIENT
Start: 2025-05-08

## 2025-05-08 RX ORDER — MELOXICAM 7.5 MG/1
7.5 TABLET ORAL EVERY 12 HOURS
COMMUNITY
Start: 2025-03-25 | End: 2025-05-27

## 2025-05-08 ASSESSMENT — PAIN SCALES - GENERAL: PAINLEVEL_OUTOF10: NO PAIN (0)

## 2025-05-08 ASSESSMENT — ENCOUNTER SYMPTOMS
HEMATURIA: 0
FREQUENCY: 1
CONSTITUTIONAL NEGATIVE: 1

## 2025-05-08 NOTE — LETTER
5/8/2025       RE: Joseph Grissom  [address]  [place] [address]     Dear Colleague,    Thank you for referring your patient, Joseph Grissom, to the [facility] [place] [facility]. Please see a copy of my visit note below.    Subjective     CHIEF COMPLAINT/REASON FOR VISIT   Patient of Dr. Cano's seen on my personal calendar  Prostate cancer recheck    HISTORY OF PRESENT ILLNESS   Mr. Grissom is very pleasant 69 year old year old male, who presents today for prostate cancer recheck.  He was last seen by Dr. Cano regarding this on 05/06/2024.  Patient was diagnosed with [place] 4+4 = 8, in the setting of a PSA of 9.02.  He completed 39 fractions of radiation and 3 years of ADT.  His PSA has been very low since he has completed therapy.  Most recent PSA was checked last week and was 0.04.    Patient also has some chronic penile pain since 2023.  He is following with neuro pain clinic, and he has seen Dr. Cano, Dr. Carrasco, and Dr. Kent regarding this.  He is on his 3rd nerve block.  He is scheduled to see Dr. Kent back this fall after he has recovered from his ELDA stimulator surgery.  She recently put in to decrease his Vesicare to 5 mg.      He denies any unexplained weight loss, bone pain, or changes in appetite.    The following portions of the patient's history were reviewed and updated as appropriate: allergies, current medications, past family history, past medical history, past social history, past surgical history, and problem list.     REVIEW OF SYSTEMS   Review of Systems   Constitutional: Negative.    Genitourinary:  Positive for frequency and penile pain. Negative for hematuria.      Per HPI.     Patient Active Problem List   Diagnosis     Prostate cancer (H)     Pelvic pain in male      Past Medical History:   Diagnosis Date     Arthritis      Cancer (H) Oct 2019    I have finished radiation & hormone  "therapy. And my PSA is great now.     Hyperlipidemia      Hypertension     Check My Chart     Mumps       Objective     PHYSICAL EXAM   BP (!) 159/76   Pulse 65   Ht 1.803 m (5' 11\")   Wt 93 kg (205 lb)   BMI 28.59 kg/m     Physical Exam  Constitutional:       Appearance: Normal appearance.   HENT:      Head: Normocephalic.   Eyes:      General: No scleral icterus.  Pulmonary:      Effort: Pulmonary effort is normal.   Neurological:      General: No focal deficit present.      Mental Status: He is alert and oriented to person, place, and time.   Psychiatric:         Mood and Affect: Mood normal.         Behavior: Behavior normal.       LABORATORY     Lab Results   Component Value Date    PSA 0.04 05/01/2025    PSA 0.03 01/09/2025    PSA 0.04 03/27/2024    PSA 0.05 12/29/2023      Assessment & Plan   1. Prostate cancer (H)    2. OAB (overactive bladder)    3. Penile pain        I had the pleasure today of meeting with Mr. Grissom to discuss his follow-up for prostate cancer.  I am pleased to inform him that his PSA remains very low at 0.04.  We briefly discussed that we would consider reoccurrence at plus the qasim.  His PSA became undetectable, still would be concern for recurrence around 2.    He should continue to monitor his PSA for possible prostate cancer reoccurrence.  He does continue to have some penile pain.  He is scheduled follow-up with Dr. Kent this fall as well as ELDA stimulator being placed with neurology.    Will plan on the following:    -Follow up with PSA and return in 1 year.      -Continue with Neurology and Dr. Kent for pelvic and penile issues.    Contact us in the interim with questions, concerns, or changes in symptomatology.    Signed by:       Adele Garza PA-C 5/8/2025 2:54 PM      Again, thank you for allowing me to participate in the care of your patient.      Sincerely,    Adele Garza PA-C    "

## 2025-05-08 NOTE — PROGRESS NOTES
"Subjective      CHIEF COMPLAINT/REASON FOR VISIT   Patient of Dr. Cano's seen on my personal calendar  Prostate cancer recheck    HISTORY OF PRESENT ILLNESS   Mr. Grissom is very pleasant 69 year old year old male, who presents today for prostate cancer recheck.  He was last seen by Dr. Cano regarding this on 05/06/2024.  Patient was diagnosed with [place] 4+4 = 8, in the setting of a PSA of 9.02.  He completed 39 fractions of radiation and 3 years of ADT.  His PSA has been very low since he has completed therapy.  Most recent PSA was checked last week and was 0.04.    Patient also has some chronic penile pain since 2023.  He is following with neuro pain clinic, and he has seen Dr. Cano, Dr. Carrasco, and Dr. Kent regarding this.  He is on his 3rd nerve block.  He is scheduled to see Dr. Kent back this fall after he has recovered from his ELDA stimulator surgery.  She recently put in to decrease his Vesicare to 5 mg.      He denies any unexplained weight loss, bone pain, or changes in appetite.    The following portions of the patient's history were reviewed and updated as appropriate: allergies, current medications, past family history, past medical history, past social history, past surgical history, and problem list.     REVIEW OF SYSTEMS   Review of Systems   Constitutional: Negative.    Genitourinary:  Positive for frequency and penile pain. Negative for hematuria.      Per HPI.     Patient Active Problem List   Diagnosis    Prostate cancer (H)    Pelvic pain in male      Past Medical History:   Diagnosis Date    Arthritis     Cancer (H) Oct 2019    I have finished radiation & hormone therapy. And my PSA is great now.    Hyperlipidemia     Hypertension     Check My Chart    Mumps       Objective      PHYSICAL EXAM   BP (!) 159/76   Pulse 65   Ht 1.803 m (5' 11\")   Wt 93 kg (205 lb)   BMI 28.59 kg/m     Physical Exam  Constitutional:       Appearance: Normal appearance.   HENT:      Head: Normocephalic. "   Eyes:      General: No scleral icterus.  Pulmonary:      Effort: Pulmonary effort is normal.   Neurological:      General: No focal deficit present.      Mental Status: He is alert and oriented to person, place, and time.   Psychiatric:         Mood and Affect: Mood normal.         Behavior: Behavior normal.       LABORATORY     Lab Results   Component Value Date    PSA 0.04 05/01/2025    PSA 0.03 01/09/2025    PSA 0.04 03/27/2024    PSA 0.05 12/29/2023      Assessment & Plan    1. Prostate cancer (H)    2. OAB (overactive bladder)    3. Penile pain        I had the pleasure today of meeting with Mr. Grissom to discuss his follow-up for prostate cancer.  I am pleased to inform him that his PSA remains very low at 0.04.  We briefly discussed that we would consider reoccurrence at plus the qasim.  His PSA became undetectable, still would be concern for recurrence around 2.    He should continue to monitor his PSA for possible prostate cancer reoccurrence.  He does continue to have some penile pain.  He is scheduled follow-up with Dr. Kent this fall as well as ELDA stimulator being placed with neurology.    Will plan on the following:    -Follow up with PSA and return in 1 year.      -Continue with Neurology and Dr. Kent for pelvic and penile issues.    Contact us in the interim with questions, concerns, or changes in symptomatology.    Signed by:       Adele Garza PA-C 5/8/2025 2:54 PM

## 2025-05-08 NOTE — PATIENT INSTRUCTIONS
Follow up with PSA and return in 1 year.      Continue with Neurology and Dr. Kent for pelvic and penile issues.    Contact us in the interim with questions, concerns, or changes in symptomatology.  [phone]

## 2025-06-11 DIAGNOSIS — N32.81 OAB (OVERACTIVE BLADDER): ICD-10-CM

## 2025-06-11 RX ORDER — SOLIFENACIN SUCCINATE 5 MG/1
10 TABLET, FILM COATED ORAL DAILY
Qty: 60 TABLET | Refills: 0 | Status: SHIPPED | OUTPATIENT
Start: 2025-06-11

## 2025-06-24 ENCOUNTER — TELEPHONE (OUTPATIENT)
Dept: UROLOGY | Facility: CLINIC | Age: 70
End: 2025-06-24
Payer: MEDICARE

## 2025-06-24 NOTE — TELEPHONE ENCOUNTER
Spoke with pharmacy tech from [facility], PA is required for Vesicare 5mg.  PA initiated and sent to PA team.    Montserrat Castelan, RN, BSN  Urology Care Coordinator  [facility]  [phone]

## 2025-06-24 NOTE — TELEPHONE ENCOUNTER
[facility] Call Center    Phone Message    May a detailed message be left on voicemail: yes     Reason for Call: Medication Question or concern regarding medication   Prescription Clarification  Name of Medication: solifenacin  Prescribing Provider: Marii Kent MD    Pharmacy: [facility] - [facility] [id number] [facility] [address]     What on the order needs clarification? Marc with pharmacy is calling saying that current script of 5mg exceeds quantity that insurance covers (they will cover up to 117 tablets, per every 90 days.)    Pharmacy states insurance would allow to have patient go back up to a higher dose, but a PA will be needed. Please review.      Action Taken: Other: [facility] - Urology    Travel Screening: Not Applicable     Date of Service:

## 2025-07-12 ENCOUNTER — DOCUMENTATION ONLY (OUTPATIENT)
Dept: OTHER | Facility: CLINIC | Age: 70
End: 2025-07-12
Payer: MEDICARE

## (undated) DEVICE — KIT ENDO TURNOVER/PROCEDURE W/CLEAN A SCOPE LINERS 103888

## (undated) RX ORDER — SULFAMETHOXAZOLE AND TRIMETHOPRIM 800; 160 MG/1; MG/1
TABLET ORAL
Status: DISPENSED
Start: 2024-12-30

## (undated) RX ORDER — FENTANYL CITRATE 50 UG/ML
INJECTION, SOLUTION INTRAMUSCULAR; INTRAVENOUS
Status: DISPENSED
Start: 2023-03-23